# Patient Record
Sex: FEMALE | Race: WHITE | Employment: FULL TIME | URBAN - METROPOLITAN AREA
[De-identification: names, ages, dates, MRNs, and addresses within clinical notes are randomized per-mention and may not be internally consistent; named-entity substitution may affect disease eponyms.]

---

## 2017-01-03 RX ORDER — ALBUTEROL SULFATE 90 UG/1
2 AEROSOL, METERED RESPIRATORY (INHALATION)
Qty: 1 INHALER | Refills: 2 | Status: SHIPPED | OUTPATIENT
Start: 2017-01-03 | End: 2018-03-13 | Stop reason: SDUPTHER

## 2017-01-03 NOTE — TELEPHONE ENCOUNTER
Cordell Uribe,   Happy New Year. Ring Junk Ring Junk Ring Junk I need a refill on my inhaler please. ..... Ring Junk Having my usual bronchitis. ..  Went to [facility] last night  1/1/17.    Edwin French

## 2017-01-27 ENCOUNTER — TELEPHONE (OUTPATIENT)
Dept: FAMILY MEDICINE CLINIC | Age: 47
End: 2017-01-27

## 2017-01-27 DIAGNOSIS — Z12.39 BREAST CANCER SCREENING: Primary | ICD-10-CM

## 2017-01-27 NOTE — TELEPHONE ENCOUNTER
Patient is calling, patient has made an appointment for 4/10/17 for her CPE. Patient is requesting that a order for a mammogram be put in for her so that she can get this test done the same day. Advised patient that they are not done here, but we can put in an order, and our coordination of care would be giving her a call to schedule at her nearest imaging facility.      Best call back # for patient if needed: [phone]

## 2017-04-10 ENCOUNTER — HOSPITAL ENCOUNTER (OUTPATIENT)
Dept: MAMMOGRAPHY | Age: 47
Discharge: HOME | End: 2017-04-10
Attending: FAMILY MEDICINE
Payer: COMMERCIAL

## 2017-04-10 ENCOUNTER — OFFICE VISIT (OUTPATIENT)
Dept: FAMILY MEDICINE CLINIC | Age: 47
End: 2017-04-10

## 2017-04-10 VITALS
SYSTOLIC BLOOD PRESSURE: 154 MMHG | WEIGHT: 200 LBS | HEART RATE: 77 BPM | BODY MASS INDEX: 31.39 KG/M2 | OXYGEN SATURATION: 97 % | HEIGHT: 67 IN | DIASTOLIC BLOOD PRESSURE: 94 MMHG | TEMPERATURE: 98.7 F | RESPIRATION RATE: 18 BRPM

## 2017-04-10 DIAGNOSIS — R55 VASOVAGAL EPISODE: ICD-10-CM

## 2017-04-10 DIAGNOSIS — J45.20 MILD INTERMITTENT ASTHMA WITHOUT COMPLICATION: ICD-10-CM

## 2017-04-10 DIAGNOSIS — E78.2 HYPERCHOLESTEROLEMIA WITH HYPERTRIGLYCERIDEMIA: ICD-10-CM

## 2017-04-10 DIAGNOSIS — K76.0 NAFL (NONALCOHOLIC FATTY LIVER): ICD-10-CM

## 2017-04-10 DIAGNOSIS — F41.8 DEPRESSION WITH ANXIETY: ICD-10-CM

## 2017-04-10 DIAGNOSIS — Z01.419 WELL WOMAN EXAM WITH ROUTINE GYNECOLOGICAL EXAM: ICD-10-CM

## 2017-04-10 DIAGNOSIS — Z00.00 ROUTINE GENERAL MEDICAL EXAMINATION AT A HEALTH CARE FACILITY: Primary | ICD-10-CM

## 2017-04-10 DIAGNOSIS — Z12.31 VISIT FOR SCREENING MAMMOGRAM: ICD-10-CM

## 2017-04-10 DIAGNOSIS — I10 BENIGN ESSENTIAL HYPERTENSION: ICD-10-CM

## 2017-04-10 DIAGNOSIS — Z12.39 SCREENING FOR BREAST CANCER: ICD-10-CM

## 2017-04-10 PROBLEM — G44.209 MUSCLE CONTRACTION HEADACHE: Status: ACTIVE | Noted: 2017-04-10

## 2017-04-10 PROCEDURE — 77063 BREAST TOMOSYNTHESIS BI: CPT

## 2017-04-10 RX ORDER — LORATADINE 10 MG/1
10 TABLET ORAL DAILY
COMMUNITY

## 2017-04-10 RX ORDER — CYCLOBENZAPRINE HCL 10 MG
10 TABLET ORAL AS NEEDED
COMMUNITY
End: 2020-12-31 | Stop reason: SDUPTHER

## 2017-04-10 RX ORDER — BISMUTH SUBSALICYLATE 262 MG
1 TABLET,CHEWABLE ORAL DAILY
COMMUNITY
End: 2018-12-26

## 2017-04-10 RX ORDER — GLUCOSAMINE SULFATE 1500 MG
POWDER IN PACKET (EA) ORAL DAILY
COMMUNITY
End: 2018-12-26

## 2017-04-10 NOTE — PATIENT INSTRUCTIONS
Vasovagal Syncope: Care Instructions  Your Care Instructions    Vasovagal syncope (say \"ftb-kqy-IHYCarmen ULLOA-kuh-pee\") is sudden dizziness or fainting that can be set off by things such as pain, stress, fear, or trauma. You may sweat or feel lightheaded, sick to your stomach, or tingly. The problem causes the heart rate to slow and the blood vessels to widen, or dilate, for a short time. When this happens, blood pools in the lower body, and less blood goes to the brain. You can usually get relief by lying down with your legs raised (elevated). This helps more blood to flow to your brain and may help relieve symptoms like feeling dizzy. Some doctors may recommend a technique that involves tensing your fists and arms. This type of fainting is often easy to predict. For example, it happens to some people when they see blood or have to get a shot. They may feel symptoms before they faint. An episode of vasovagal syncope usually responds well to self-care. Other treatment often isn't needed. But if the fainting keeps happening, your doctor may suggest further treatments. Follow-up care is a key part of your treatment and safety. Be sure to make and go to all appointments, and call your doctor if you are having problems. It's also a good idea to know your test results and keep a list of the medicines you take. How can you care for yourself at home? · Drink plenty of fluids to prevent dehydration. If you have kidney, heart, or liver disease and have to limit fluids, talk with your doctor before you increase your fluid intake. · Try to avoid things that you think may set off vasovagal syncope. · Talk to your doctor about any medicines you take. Some medicines may increase the chance of this condition occurring. · If you feel symptoms, lie down with your legs raised. Talk to your doctor about what to do if your symptoms come back. When should you call for help?   Call 911 anytime you think you may need emergency care. For example, call if:  · You have symptoms of a heart problem. These may include:  ¨ Chest pain or pressure. ¨ Severe trouble breathing. ¨ A fast or irregular heartbeat. Watch closely for changes in your health, and be sure to contact your doctor if:  · You have more episodes of fainting at home. · You do not get better as expected. Where can you learn more? Go to http://margaret-dano.info/. Enter L754 in the search box to learn more about \"Vasovagal Syncope: Care Instructions. \"  Current as of: May 27, 2016  Content Version: 11.2  © 4938-1777 [employer]. Care instructions adapted under license by [employer] (which disclaims liability or warranty for this information). If you have questions about a medical condition or this instruction, always ask your healthcare professional. [address] any warranty or liability for your use of this information. Well Visit, Ages 25 to 48: Care Instructions  Your Care Instructions  Physical exams can help you stay healthy. Your doctor has checked your overall health and may have suggested ways to take good care of yourself. He or she also may have recommended tests. At home, you can help prevent illness with healthy eating, regular exercise, and other steps. Follow-up care is a key part of your treatment and safety. Be sure to make and go to all appointments, and call your doctor if you are having problems. It's also a good idea to know your test results and keep a list of the medicines you take. How can you care for yourself at home? · Reach and stay at a healthy weight. This will lower your risk for many problems, such as obesity, diabetes, heart disease, and high blood pressure. · Get at least 30 minutes of physical activity on most days of the week. Walking is a good choice.  You also may want to do other activities, such as running, swimming, cycling, or playing tennis or team sports. Discuss any changes in your exercise program with your doctor. · Do not smoke or allow others to smoke around you. If you need help quitting, talk to your doctor about stop-smoking programs and medicines. These can increase your chances of quitting for good. · Talk to your doctor about whether you have any risk factors for sexually transmitted infections (STIs). Having one sex partner (who does not have STIs and does not have sex with anyone else) is a good way to avoid these infections. · Use birth control if you do not want to have children at this time. Talk with your doctor about the choices available and what might be best for you. · Protect your skin from too much sun. When you're outdoors from 10 a.m. to 4 p.m., stay in the shade or cover up with clothing and a hat with a wide brim. Wear sunglasses that block UV rays. Even when it's cloudy, put broad-spectrum sunscreen (SPF 30 or higher) on any exposed skin. · See a dentist one or two times a year for checkups and to have your teeth cleaned. · Wear a seat belt in the car. · Drink alcohol in moderation, if at all. That means no more than 2 drinks a day for men and 1 drink a day for women. Follow your doctor's advice about when to have certain tests. These tests can spot problems early. For everyone  · Cholesterol. Have the fat (cholesterol) in your blood tested after age 21. Your doctor will tell you how often to have this done based on your age, family history, or other things that can increase your risk for heart disease. · Blood pressure. Have your blood pressure checked during a routine doctor visit. Your doctor will tell you how often to check your blood pressure based on your age, your blood pressure results, and other factors. · Vision. Talk with your doctor about how often to have a glaucoma test.  · Diabetes. Ask your doctor whether you should have tests for diabetes. · Colon cancer. Have a test for colon cancer at age 48.  You may have one of several tests. If you are younger than 48, you may need a test earlier if you have any risk factors. Risk factors include whether you already had a precancerous polyp removed from your colon or whether your parent, brother, sister, or child has had colon cancer. For women  · Breast exam and mammogram. Talk to your doctor about when you should have a clinical breast exam and a mammogram. Medical experts differ on whether and how often women under 50 should have these tests. Your doctor can help you decide what is right for you. · Pap test and pelvic exam. Begin Pap tests at age 24. A Pap test is the best way to find cervical cancer. The test often is part of a pelvic exam. Ask how often to have this test.  · Tests for sexually transmitted infections (STIs). Ask whether you should have tests for STIs. You may be at risk if you have sex with more than one person, especially if your partners do not wear condoms. For men  · Tests for sexually transmitted infections (STIs). Ask whether you should have tests for STIs. You may be at risk if you have sex with more than one person, especially if you do not wear a condom. · Testicular cancer exam. Ask your doctor whether you should check your testicles regularly. · Prostate exam. Talk to your doctor about whether you should have a blood test (called a PSA test) for prostate cancer. Experts differ on whether and when men should have this test. Some experts suggest it if you are older than 39 and are -American or have a father or brother who got prostate cancer when he was younger than 72. When should you call for help? Watch closely for changes in your health, and be sure to contact your doctor if you have any problems or symptoms that concern you. Where can you learn more? Go to http://margaret-dano.info/. Enter P072 in the search box to learn more about \"Well Visit, Ages 25 to 48: Care Instructions. \"  Current as of: July 19, 2016  Content Version: 11.2  © 0147-2439 [employer]. Care instructions adapted under license by [employer] (which disclaims liability or warranty for this information). If you have questions about a medical condition or this instruction, always ask your healthcare professional. [address] any warranty or liability for your use of this information.

## 2017-04-10 NOTE — MR AVS SNAPSHOT
Visit Information Date & Time Provider Department Dept. Phone Encounter #  
 4/10/2017 11:00 AM Zhaola Janetin, [address] [phone] [id number] Upcoming Health Maintenance Date Due  
 PAP AKA CERVICAL CYTOLOGY 2/24/2018 Pneumococcal 19-64 Highest Risk (2 of 3 - PCV13) 4/10/2018 DTaP/Tdap/Td series (2 - Td) 2/17/2024 Allergies as of 4/10/2017  Review Complete On: 4/10/2017 By: Anna Mendoza MD  
  
 Severity Noted Reaction Type Reactions Crestor [Rosuvastatin]  10/12/2015    Myalgia Insomnia and muscle pain Flonase [Fluticasone]  03/22/2016    Other (comments) Cataracts enlarged Hydrochlorothiazide  03/05/2015    Other (comments) Insomnia, headache, ringing in ears, anxious Nortriptyline  04/10/2017    Other (comments) Increased depression Other Medication  01/09/2012    Other (comments) Pt is avoiding Nasal CS d/t cataracts Sulfa (Sulfonamide Antibiotics)  04/28/2010    Hives Current Immunizations  Reviewed on 4/12/2016 Name Date Influenza Vaccine 11/12/2014 Meningococcal Vaccine 8/7/2002 PPD 8/13/1999, 6/19/1998 Rabies Vaccine 9/28/2003 TD Vaccine 9/28/2003, 6/19/1998 Tdap 2/17/2014 Not reviewed this visit You Were Diagnosed With   
  
 Codes Comments Routine general medical examination at a health care facility    -  Primary ICD-10-CM: Z00.00 ICD-9-CM: V70.0 Well woman exam with routine gynecological exam     ICD-10-CM: R54.983 ICD-9-CM: V72.31 Hypercholesterolemia with hypertriglyceridemia     ICD-10-CM: E78.2 ICD-9-CM: 272.2 Benign essential hypertension     ICD-10-CM: I10 
ICD-9-CM: 401.1 NAFL (nonalcoholic fatty liver)     ICD-10-CM: K76.0 ICD-9-CM: 571.8 Vasovagal episode     ICD-10-CM: R55 
ICD-9-CM: 780.2 Screening for breast cancer     ICD-10-CM: Z12.39 
ICD-9-CM: V76.10 Vitals BP Pulse Temp Resp Height(growth percentile) Weight(growth percentile) (!) 154/94 (BP 1 Location: Left arm, BP Patient Position: Sitting) 77 98.7 °F (37.1 °C) (Oral) 18 5' 7.25\" (1.708 m) 200 lb (90.7 kg) SpO2 BMI OB Status Smoking Status 97% 31.09 kg/m2 Medically Induced Former Smoker BMI and BSA Data Body Mass Index Body Surface Area 31.09 kg/m 2 2.07 m 2 Preferred Pharmacy Pharmacy Name Phone [id number] [phone] [phone] Your Updated Medication List  
  
   
This list is accurate as of: 4/10/17 12:21 PM.  Always use your most recent med list.  
  
  
  
  
 albuterol 90 mcg/actuation inhaler Commonly known as:  PROVENTIL HFA, VENTOLIN HFA, PROAIR HFA Take 2 Puffs by inhalation every six (6) hours as needed for Wheezing. ALPRAZolam 0.5 mg tablet Commonly known as:  XANAX  
TAKE 1/2 TABLET AT BEDTIME IF NEEDED FOR ANXIETY/SLEEP  
  
 aspirin delayed-release 81 mg tablet Take  by mouth daily. CLARITIN 10 mg tablet Generic drug:  loratadine Take 10 mg by mouth daily. cyclobenzaprine 10 mg tablet Commonly known as:  FLEXERIL Take 10 mg by mouth as needed for Muscle Spasm(s). Takes a few x a month max FISH OIL PO Take 1,000 mg by mouth daily. levonorgestrel-ethinyl estradiol 0.15 mg-30 mcg 3mpk Commonly known as:  SEASONALE  
TAKE 1 TABLET BY MOUTH EVERY DAY  
  
 multivitamin tablet Commonly known as:  ONE A DAY Take 1 Tab by mouth daily. sertraline 50 mg tablet Commonly known as:  ZOLOFT  
TAKE 1 AND 1/2 TABLETS BY MOUTH DAILY  
  
 SLO-NIACIN PO Take 500 mg by mouth daily. VITAMIN D3 1,000 unit Cap Generic drug:  cholecalciferol Take  by mouth daily. We Performed the Following AMB POC EKG ROUTINE W/ 12 LEADS, INTER & REP [12261 CPT(R)] CBC WITH AUTOMATED DIFF [56550 CPT(R)] HEMOGLOBIN A1C WITH EAG [59330 CPT(R)] LIPID PANEL [03244 CPT(R)] METABOLIC PANEL, COMPREHENSIVE [52431 CPT(R)] PAP IG, HPV AND RFX HPV 80/74,68([id number]) [47578 CPT(R)] TSH 3RD GENERATION [59669 CPT(R)] URINALYSIS W/MICROSCOPIC [00663 CPT(R)] To-Do List   
 04/10/2017 Imaging:  [facility] 3D ROCK W MAMMO BI DX INCL CAD   
  
 04/10/2017 1:30 PM  
(Arrive by 1:15 PM) Appointment with [facility] MAM 1 at [facility] ([phone]) Shower or bathe using soap and water. Do not use deodorant, powder, perfumes, or lotion the day of your exam.  If your prior mammograms were not performed at [facility] please bring films with you or forward prior images 2 days before your procedure. Check in at registration 15min before your appointment time unless you were instructed to do otherwise. A script is not necessary, but if you have one, please bring it on the day of the mammogram or have it faxed to the department. [facility] [phone] [facility]  [phone] [facility]  [phone] [facility] [phone] [address] [phone] Please arrive 15 minutes prior to appointment to register Patient Instructions Vasovagal Syncope: Care Instructions Your Care Instructions Vasovagal syncope (say \"igc-yzs-RBC-gul XZIT-jww-ldk\") is sudden dizziness or fainting that can be set off by things such as pain, stress, fear, or trauma. You may sweat or feel lightheaded, sick to your stomach, or tingly. The problem causes the heart rate to slow and the blood vessels to widen, or dilate, for a short time. When this happens, blood pools in the lower body, and less blood goes to the brain. You can usually get relief by lying down with your legs raised (elevated). This helps more blood to flow to your brain and may help relieve symptoms like feeling dizzy. Some doctors may recommend a technique that involves tensing your fists and arms. This type of fainting is often easy to predict. For example, it happens to some people when they see blood or have to get a shot. They may feel symptoms before they faint. An episode of vasovagal syncope usually responds well to self-care. Other treatment often isn't needed. But if the fainting keeps happening, your doctor may suggest further treatments. Follow-up care is a key part of your treatment and safety. Be sure to make and go to all appointments, and call your doctor if you are having problems. It's also a good idea to know your test results and keep a list of the medicines you take. How can you care for yourself at home? · Drink plenty of fluids to prevent dehydration. If you have kidney, heart, or liver disease and have to limit fluids, talk with your doctor before you increase your fluid intake. · Try to avoid things that you think may set off vasovagal syncope. · Talk to your doctor about any medicines you take. Some medicines may increase the chance of this condition occurring. · If you feel symptoms, lie down with your legs raised. Talk to your doctor about what to do if your symptoms come back. When should you call for help? Call 911 anytime you think you may need emergency care. For example, call if: 
· You have symptoms of a heart problem. These may include: ¨ Chest pain or pressure. ¨ Severe trouble breathing. ¨ A fast or irregular heartbeat. Watch closely for changes in your health, and be sure to contact your doctor if: 
· You have more episodes of fainting at home. · You do not get better as expected. Where can you learn more? Go to http://margaret-dano.info/. Enter L754 in the search box to learn more about \"Vasovagal Syncope: Care Instructions. \" Current as of: May 27, 2016 Content Version: 11.2 © 3676-3230 [employer].  Care instructions adapted under license by [employer] (which disclaims liability or warranty for this information). If you have questions about a medical condition or this instruction, always ask your healthcare professional. [address] any warranty or liability for your use of this information. Well Visit, Ages 25 to 48: Care Instructions Your Care Instructions Physical exams can help you stay healthy. Your doctor has checked your overall health and may have suggested ways to take good care of yourself. He or she also may have recommended tests. At home, you can help prevent illness with healthy eating, regular exercise, and other steps. Follow-up care is a key part of your treatment and safety. Be sure to make and go to all appointments, and call your doctor if you are having problems. It's also a good idea to know your test results and keep a list of the medicines you take. How can you care for yourself at home? · Reach and stay at a healthy weight. This will lower your risk for many problems, such as obesity, diabetes, heart disease, and high blood pressure. · Get at least 30 minutes of physical activity on most days of the week. Walking is a good choice. You also may want to do other activities, such as running, swimming, cycling, or playing tennis or team sports. Discuss any changes in your exercise program with your doctor. · Do not smoke or allow others to smoke around you. If you need help quitting, talk to your doctor about stop-smoking programs and medicines. These can increase your chances of quitting for good. · Talk to your doctor about whether you have any risk factors for sexually transmitted infections (STIs). Having one sex partner (who does not have STIs and does not have sex with anyone else) is a good way to avoid these infections. · Use birth control if you do not want to have children at this time. Talk with your doctor about the choices available and what might be best for you. · Protect your skin from too much sun. When you're outdoors from 10 a.m. to 4 p.m., stay in the shade or cover up with clothing and a hat with a wide brim. Wear sunglasses that block UV rays. Even when it's cloudy, put broad-spectrum sunscreen (SPF 30 or higher) on any exposed skin. · See a dentist one or two times a year for checkups and to have your teeth cleaned. · Wear a seat belt in the car. · Drink alcohol in moderation, if at all. That means no more than 2 drinks a day for men and 1 drink a day for women. Follow your doctor's advice about when to have certain tests. These tests can spot problems early. For everyone · Cholesterol. Have the fat (cholesterol) in your blood tested after age 21. Your doctor will tell you how often to have this done based on your age, family history, or other things that can increase your risk for heart disease. · Blood pressure. Have your blood pressure checked during a routine doctor visit. Your doctor will tell you how often to check your blood pressure based on your age, your blood pressure results, and other factors. · Vision. Talk with your doctor about how often to have a glaucoma test. 
· Diabetes. Ask your doctor whether you should have tests for diabetes. · Colon cancer. Have a test for colon cancer at age 48. You may have one of several tests. If you are younger than 48, you may need a test earlier if you have any risk factors. Risk factors include whether you already had a precancerous polyp removed from your colon or whether your parent, brother, sister, or child has had colon cancer. For women · Breast exam and mammogram. Talk to your doctor about when you should have a clinical breast exam and a mammogram. Medical experts differ on whether and how often women under 50 should have these tests. Your doctor can help you decide what is right for you. · Pap test and pelvic exam. Begin Pap tests at age 24.  A Pap test is the best way to find cervical cancer. The test often is part of a pelvic exam. Ask how often to have this test. 
· Tests for sexually transmitted infections (STIs). Ask whether you should have tests for STIs. You may be at risk if you have sex with more than one person, especially if your partners do not wear condoms. For men · Tests for sexually transmitted infections (STIs). Ask whether you should have tests for STIs. You may be at risk if you have sex with more than one person, especially if you do not wear a condom. · Testicular cancer exam. Ask your doctor whether you should check your testicles regularly. · Prostate exam. Talk to your doctor about whether you should have a blood test (called a PSA test) for prostate cancer. Experts differ on whether and when men should have this test. Some experts suggest it if you are older than 39 and are -American or have a father or brother who got prostate cancer when he was younger than 72. When should you call for help? Watch closely for changes in your health, and be sure to contact your doctor if you have any problems or symptoms that concern you. Where can you learn more? Go to http://margaret-dano.info/. Enter P072 in the search box to learn more about \"Well Visit, Ages 25 to 48: Care Instructions. \" Current as of: July 19, 2016 Content Version: 11.2 © 0251-9479 [employer]. Care instructions adapted under license by [employer] (which disclaims liability or warranty for this information). If you have questions about a medical condition or this instruction, always ask your healthcare professional. [place] [address] any warranty or liability for your use of this information. Introducing [facility] & HEALTH SERVICES! Dear [facility]: Thank you for requesting a [employer] account. Our records indicate that you already have an active [employer] account.   You can access your account anytime at [email or web address] Did you know that you can access your hospital and ER discharge instructions at any time in [employer]? You can also review all of your test results from your hospital stay or ER visit. Additional Information If you have questions, please visit the Frequently Asked Questions section of the [employer] website at [email or web address] Remember, [employer] is NOT to be used for urgent needs. For medical emergencies, dial 911. Now available from your iPhone and Android! Please provide this summary of care documentation to your next provider. Your primary care clinician is listed as SUHA COWART. If you have any questions after today's visit, please call [phone].

## 2017-04-10 NOTE — PROGRESS NOTES
HISTORY OF PRESENT ILLNESS  Emeka Mantilla is a 55 y.o. female. HPI  Annual CPE, fasting and well woman visit/gyn exam w/ pap. Continues on Seasonale for control of primary dysmenorrhea. Continues on Zoloft for mood stability and PMS. This is working well for her. She tried lowering her dose from 75 to 50 mg daily but she felt more gonzalez and irritable, so she has been back on the 75 mg dose and doing fine since then. She had seen Neuro for migraine headaches. She was prescribed Pamelor but pt felt like that medication made her down and depressed, so she is no longer taking it. She has not had anymore headaches and has not needed Imitrex. She takes Flexeril maybe once a month for muscle spasms/tension in neck which really works well for her. Takes Xanax 1/2 tablet qhs prn insomnia related to anxiety. She has noticed a new \"tremor or twitch\" in her hands at times. Concerned b/c father has same h/o this and pt wants \"testing\", so she will be going back to neuro Dr Renetta Oliva for further evaluation of this. Reports a fainting spell that happened at work a little over a month ago. On the morning of 3/1/17 around 9 AM she was standing in the office getting \"fussed at\" by her boss. She hadnt eaten breakfast that morning (and later through today's visit realized she takes Niacin QAM which I have since instructed her to take qhs w/ low fat snack). She began to feel hot, light headed, and sat in the chair, then fainted and fell forward onto the desk face down. Her boss was there and kept her from falling out of the chair. Patient states she was \"out\" for less than 10 sec. Coworker gave her some PB crackers and they called 911. EMT checked her. She states her vitals and BS were all normal by that time  And she felt completely fine afterwards. She even worked the rest of the day. No recurrences since that time. She admits her eating habits havent been that great lately at all.      Review of Systems Constitutional: Negative. HENT: Negative. Eyes: Negative. Wears glasses, up to date on routine eye exam at [facility]   Respiratory: Negative. Negative for sputum production. Only used her rescue inhaler about 4 x last year. Triggered by fall time in October, URI in 12/2016, cleaning agents/dust they are using at work occ will trigger it. Inhaler or removing herself from close proximity during those times quickly alleviates her sx   Cardiovascular: Negative. Gastrointestinal: Negative. Genitourinary: Negative. Musculoskeletal: Negative. Neurological: Negative for dizziness, tingling and focal weakness. Psychiatric/Behavioral:        Doing well on current regimen of Zoloft. She takes Xanax 1/2 tablet most nights for sleep onset and sleep maintenance during weekdays     Problem List  Date Reviewed: 4/10/2017          Codes Class Noted    Thrombocytosis ([facility] [address] ICD-10-CM: D47.3  ICD-9-CM: 238.71  3/22/2016    Overview Signed 3/22/2016 10:08 AM by MD Dr Sun Conley 2015             Lymphocytosis ICD-10-CM: W80.820  ICD-9-CM: 288.61  3/22/2016    Overview Signed 3/22/2016 10:08 AM by MD Dr Sun Conley, Maeve             Microhematuria ICD-10-CM: R31.29  ICD-9-CM: 599.72  12/15/2015    Overview Signed 12/15/2015  3:47 PM by Mercedes Raines MD     [id number] [facility] [address] Kriss Mejia.  KUB neg excpet small stone vs phlebolith             NAFL (nonalcoholic fatty liver) JAG-09-DM: K76.0  ICD-9-CM: 571.8  8/27/2015    Overview Signed 8/27/2015  8:45 AM by MD Dr Madai Conley             Focal nodular hyperplasia of liver ICD-10-CM: K76.89  ICD-9-CM: 573.8  5/10/2015    Overview Signed 8/27/2015  8:45 AM by MD MADELINE Conley Dr and Tricia Harmon             Benign essential hypertension ICD-10-CM: I10  ICD-9-CM: 401.1  2/24/2015    Overview Signed 2/24/2015 10:21 AM by Mercedes Raines MD [id number]             Vitamin D deficiency ICD-10-CM: E55.9  ICD-9-CM: 268.9  12/13/2011    Overview Signed 12/13/2011  1:16 PM by Luisa Abreu MD     10/2011             Hypercholesterolemia with high triglycerides ICD-10-CM: E78.2  ICD-9-CM: 272.2  12/13/2011        Chronic Mechanical back pain ICD-10-CM: M54.9  ICD-9-CM: 724.5  10/25/2011        History of pyelonephritis, 1992 ICD-10-CM: Z87.440  ICD-9-CM: V13.02  10/25/2011        Allergic rhinitis ICD-10-CM: J30.9  ICD-9-CM: 477.9  10/25/2011    Overview Signed 10/25/2011  9:45 AM by Luisa Abreu MD     Worse in Spring             Cataract of right eye ICD-10-CM: H26.9  ICD-9-CM: 366.9  10/25/2011    Overview Signed 10/25/2011  9:46 AM by Luisa Abreu MD     Opth VEI, Dr Lisa Stein             S/P Dog bite, 2003 ICD-10-CM: Bhavna Signs. 0XXA  ICD-9-CM: E906.0  10/25/2011    Overview Signed 10/25/2011  9:46 AM by Luisa Abreu MD     Td and Rabies Series             Rosacea ICD-10-CM: L71.9  ICD-9-CM: 695.3  10/25/2011        Fibrocystic breast changes ICD-10-CM: N60.19  ICD-9-CM: 610.1  10/25/2011        Raynaud's syndrome ICD-10-CM: I73.00  ICD-9-CM: 443.0  Unknown    Overview Signed 8/27/2015  8:48 AM by Luisa Abreu MD     Autoimmune workup Dr Agustin Armendariz Spring 2015             PMS (premenstrual syndrome) ICD-10-CM: N94.3  ICD-9-CM: 625.4  6/21/2010        Depression ICD-10-CM: F32.9  ICD-9-CM: 914  6/21/2010        Asthma, mild intermittent ICD-10-CM: J45.909  ICD-9-CM: 493.90  6/21/2010    Overview Signed 10/25/2011  9:45 AM by Luisa Abreu MD     Since ~ 20 yo             Primary dysmenorrhea ICD-10-CM: N94.4  ICD-9-CM: 625.3  6/21/2010        Anxiety attacks ICD-10-CM: F41.0  ICD-9-CM: 300.01  6/21/2010    Overview Signed 6/21/2010  8:28 PM by Luisa Abreu MD     March 2010                 Past Surgical History:   Procedure Laterality Date    EKG TREADMILL STRESS TEST  May 2010    Dr Boy Lorenzo and Dr Reji Harmon eval for atypical CP; dx Anxiety    EMG TWO EXTREMITIES UPPER      normal    HX OTHER SURGICAL  3/2014 ordered by Dr Iram Tavares    Bone Marrow Biospy, benign    HX SHOULDER ARTHROSCOPY      left shoulder    US PELV NON OBS      normal    XR SPINE LUMB 2 OR 3 V      normal    XR SPINE THORAC 3 V      normal    XR UPPER GI SERIES W KUB      normal     OB History      Para Term  AB TAB SAB Ectopic Multiple Living    0 0 0 0 0 0 0 0 0 0        Current Outpatient Prescriptions   Medication Sig    cyclobenzaprine (FLEXERIL) 10 mg tablet Take 10 mg by mouth as needed for Muscle Spasm(s). Takes a few x a month max for neck muscle spasms and tension    SLO-NIACIN PO Take 500 mg by mouth daily.  DOCOSAHEXANOIC ACID/EPA (FISH OIL PO) Take 1,000 mg by mouth daily.  multivitamin (ONE A DAY) tablet Take 1 Tab by mouth daily.  cholecalciferol (VITAMIN D3) 1,000 unit cap Take  by mouth daily.  loratadine (CLARITIN) 10 mg tablet Take 10 mg by mouth daily.  levonorgestrel-ethinyl estradiol (SEASONALE) 0.15 mg-30 mcg 3MPk TAKE 1 TABLET BY MOUTH EVERY DAY    sertraline (ZOLOFT) 50 mg tablet TAKE 1 AND 1/2 TABLETS BY MOUTH DAILY    albuterol (PROVENTIL HFA, VENTOLIN HFA, PROAIR HFA) 90 mcg/actuation inhaler Take 2 Puffs by inhalation every six (6) hours as needed for Wheezing.  ALPRAZolam (XANAX) 0.5 mg tablet TAKE 1/2 TABLET AT BEDTIME IF NEEDED FOR ANXIETY/SLEEP    aspirin delayed-release 81 mg tablet Take  by mouth daily. No current facility-administered medications for this visit.       Allergies   Allergen Reactions    Crestor [Rosuvastatin] Myalgia     Insomnia and muscle pain    Flonase [Fluticasone] Other (comments)     Cataracts enlarged    Hydrochlorothiazide Other (comments)     Insomnia, headache, ringing in ears, anxious    Nortriptyline Other (comments)     Increased depression    Other Medication Other (comments)     Pt is avoiding Nasal CS d/t cataracts    Sulfa (Sulfonamide Antibiotics) Hives       Social History     Social History    Marital status: SINGLE     Spouse name: N/A    Number of children: 0    Years of education: N/A     Occupational History         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    Smoking status: Former Smoker     Types: Cigarettes     Quit date: 10/25/1991    Smokeless tobacco: Never Used      Comment: smoked lightly for about 5 months    Alcohol use 0.0 oz/week     0 Standard drinks or equivalent per week      Comment: very seldom    Drug use: No    Sexual activity: No     Other Topics Concern    Caffeine Concern Yes     3 12 oz cans of diet Mountain Dews a day    Stress Concern Yes     was going for sleep eval per Dr Tito Closs 2015 to r/o JUAN MANUEL, but she never went    Weight Concern No    Special Diet No     not doing ATkins right now but less fast foods, no fried foods    Exercise No     Social History Narrative     Immunization History   Administered Date(s) Administered    Influenza Vaccine 2014    Meningococcal Vaccine 2002    PPD 1998, 1999    Rabies Vaccine 2003    TD Vaccine 1998, 2003    Tdap 2014       Family History   Problem Relation Age of Onset    High Cholesterol Mother      high TG's in the 80's    Hypertension Mother     Thyroid Disease Mother     Other Mother      benign ovarian cysts    Anxiety Mother     Depression Mother     Diabetes Father      type 2    Cancer Father      Hodgkins    Arrhythmia Father 72     Ablation; chemo/radiation induced    Heart Surgery Father 72     stents placed    Anxiety Maternal Grandmother     Heart Disease Maternal Grandmother       80; h/o A.  Fib    Stroke Maternal Grandmother     Heart Attack Maternal Grandfather       age 74    Other Paternal Grandmother       GI bleed in her early 57's    Cancer Paternal Grandfather 61 pancreatic and liver cancer,  age 61     Immunization History   Administered Date(s) Administered    Influenza Vaccine 2014    Meningococcal Vaccine 2002    PPD 1998, 1999    Rabies Vaccine 2003    TD Vaccine 1998, 2003    Tdap 2014     Visit Vitals    BP (!) 154/94 (BP 1 Location: Left arm, BP Patient Position: Sitting), states she is very anxious bc having pap smear today which always makes her this way    Pulse 77    Temp 98.7 °F (37.1 °C) (Oral)    Resp 18    Ht 5' 7.25\" (1.708 m)    Wt 200 lb (90.7 kg)    SpO2 97%    BMI 31.09 kg/m2     Physical Exam   Constitutional: She is oriented to person, place, and time. She appears well-developed and well-nourished. No distress. HENT:   Right Ear: Tympanic membrane normal.   Left Ear: Tympanic membrane normal.   Nose: Nose normal.   Mouth/Throat: Oropharynx is clear and moist. No oropharyngeal exudate. Eyes: Conjunctivae and EOM are normal. Pupils are equal, round, and reactive to light. Neck: Neck supple. No JVD present. Carotid bruit is not present. No thyromegaly present. Cardiovascular: Normal rate, regular rhythm, normal heart sounds and intact distal pulses. Exam reveals no gallop. No murmur heard. Pulmonary/Chest: Effort normal and breath sounds normal. No respiratory distress. She has no wheezes. She has no rales. Right breast exhibits no inverted nipple, no nipple discharge, no skin change and no tenderness. Left breast exhibits no inverted nipple, no nipple discharge, no skin change and no tenderness. Breasts are symmetrical.   B superior breast tissue dense, fibrous, cystic   Abdominal: Soft. Bowel sounds are normal. She exhibits no distension and no mass. There is no tenderness. Genitourinary: Vagina normal and uterus normal. Cervix exhibits no motion tenderness, no discharge and no friability. Right adnexum displays no mass, no tenderness and no fullness.  Left adnexum displays no mass, no tenderness and no fullness. No vaginal discharge found. Musculoskeletal: Normal range of motion. She exhibits no edema or tenderness. Lymphadenopathy:     She has no cervical adenopathy. She has no axillary adenopathy. Right: No supraclavicular adenopathy present. Left: No supraclavicular adenopathy present. Neurological: She is alert and oriented to person, place, and time. No cranial nerve deficit. Coordination normal.   Skin: Skin is warm and dry. Psychiatric: She has a normal mood and affect. Her behavior is normal. Thought content normal.   Vitals reviewed. ASSESSMENT and PLAN    ICD-10-CM ICD-9-CM    1. Routine general medical examination at a health care facility Z00.00 V70.0 CBC WITH AUTOMATED DIFF      LIPID PANEL      METABOLIC PANEL, COMPREHENSIVE      TSH 3RD GENERATION      HEMOGLOBIN A1C WITH EAG      AMB POC EKG ROUTINE W/ 12 LEADS, INTER & REP      URINALYSIS W/MICROSCOPIC   2. Well woman exam with routine gynecological exam Z01.419 V72.31 PAP IG, HPV AND RFX HPV 89/05,80([id number])   3. Hypercholesterolemia with high triglycerides E78.2 272.2 LIPID PANEL   4. Benign essential hypertension I10 401.1    5. Depression with anxiety F41.8 300.4 Stable on Zoloft 75 mg daily and prn Xanax as directed   6. NAFL (nonalcoholic fatty liver) B98.4 928.0 METABOLIC PANEL, COMPREHENSIVE   7. Vasovagal episode R55 780.2    8. Mild intermittent asthma without complication W41.37 630.86 Stable, controlled w/ trigger avoidance, environmental controls and prn Albuterol which she seldom needs to use   9.  Screening for breast cancer Z12.39 V76.10 ANGEL 3D ROCK W MAMMO BI SCREENING INCL CAD     Fasting labs today  Pap collected today  Mammogram scheduled for this afternoon  Reviewed diet, nutrition, exercise, weight control, health maintenance, wellness counseling  Cardiovascular risk and specific lipid/LDL/BP goals reviewed  Reviewed medications and side effects in detail  Further follow up & other recommendations pending review of labs as well  RTC 6 month follow up and medication check

## 2017-04-10 NOTE — PROGRESS NOTES
Chief Complaint   Patient presents with    Complete Physical     has well woman here -      1. Have you been to the ER, urgent care clinic since your last visit? Hospitalized since your last visit? No    2. Have you seen or consulted any other health care providers outside of the [address] [place] [address] since your last visit? Include any pap smears or colon screening.  No

## 2017-04-11 LAB
ALBUMIN SERPL-MCNC: 4.1 G/DL (ref 3.5–5.5)
ALBUMIN/GLOB SERPL: 1.9 {RATIO} (ref 1.2–2.2)
ALP SERPL-CCNC: 57 IU/L (ref 39–117)
ALT SERPL-CCNC: 18 IU/L (ref 0–32)
APPEARANCE UR: CLEAR
AST SERPL-CCNC: 17 IU/L (ref 0–40)
BACTERIA #/AREA URNS HPF: NORMAL /[HPF]
BASOPHILS # BLD AUTO: 0 X10E3/UL (ref 0–0.2)
BASOPHILS NFR BLD AUTO: 0 %
BILIRUB SERPL-MCNC: 0.3 MG/DL (ref 0–1.2)
BILIRUB UR QL STRIP: NEGATIVE
BUN SERPL-MCNC: 12 MG/DL (ref 6–24)
BUN/CREAT SERPL: 18 (ref 9–23)
CALCIUM SERPL-MCNC: 9.1 MG/DL (ref 8.7–10.2)
CASTS URNS QL MICRO: NORMAL /LPF
CHLORIDE SERPL-SCNC: 96 MMOL/L (ref 96–106)
CHOLEST SERPL-MCNC: 226 MG/DL (ref 100–199)
CO2 SERPL-SCNC: 20 MMOL/L (ref 18–29)
COLOR UR: YELLOW
CREAT SERPL-MCNC: 0.68 MG/DL (ref 0.57–1)
EOSINOPHIL # BLD AUTO: 0.3 X10E3/UL (ref 0–0.4)
EOSINOPHIL NFR BLD AUTO: 3 %
EPI CELLS #/AREA URNS HPF: NORMAL /HPF
ERYTHROCYTE [DISTWIDTH] IN BLOOD BY AUTOMATED COUNT: 14.2 % (ref 12.3–15.4)
EST. AVERAGE GLUCOSE BLD GHB EST-MCNC: 117 MG/DL
GLOBULIN SER CALC-MCNC: 2.2 G/DL (ref 1.5–4.5)
GLUCOSE SERPL-MCNC: 87 MG/DL (ref 65–99)
GLUCOSE UR QL: NEGATIVE
HBA1C MFR BLD: 5.7 % (ref 4.8–5.6)
HCT VFR BLD AUTO: 38.8 % (ref 34–46.6)
HDLC SERPL-MCNC: 49 MG/DL
HGB BLD-MCNC: 12.8 G/DL (ref 11.1–15.9)
HGB UR QL STRIP: ABNORMAL
IMM GRANULOCYTES # BLD: 0 X10E3/UL (ref 0–0.1)
IMM GRANULOCYTES NFR BLD: 0 %
INTERPRETATION, 910389: NORMAL
KETONES UR QL STRIP: NEGATIVE
LDLC SERPL CALC-MCNC: 146 MG/DL (ref 0–99)
LEUKOCYTE ESTERASE UR QL STRIP: ABNORMAL
LYMPHOCYTES # BLD AUTO: 3.9 X10E3/UL (ref 0.7–3.1)
LYMPHOCYTES NFR BLD AUTO: 39 %
MCH RBC QN AUTO: 29.2 PG (ref 26.6–33)
MCHC RBC AUTO-ENTMCNC: 33 G/DL (ref 31.5–35.7)
MCV RBC AUTO: 89 FL (ref 79–97)
MICRO URNS: ABNORMAL
MONOCYTES # BLD AUTO: 0.4 X10E3/UL (ref 0.1–0.9)
MONOCYTES NFR BLD AUTO: 4 %
NEUTROPHILS # BLD AUTO: 5.5 X10E3/UL (ref 1.4–7)
NEUTROPHILS NFR BLD AUTO: 54 %
NITRITE UR QL STRIP: NEGATIVE
PH UR STRIP: 6.5 [PH] (ref 5–7.5)
PLATELET # BLD AUTO: 458 X10E3/UL (ref 150–379)
POTASSIUM SERPL-SCNC: 5 MMOL/L (ref 3.5–5.2)
PROT SERPL-MCNC: 6.3 G/DL (ref 6–8.5)
PROT UR QL STRIP: NEGATIVE
RBC # BLD AUTO: 4.38 X10E6/UL (ref 3.77–5.28)
RBC #/AREA URNS HPF: NORMAL /HPF
SODIUM SERPL-SCNC: 132 MMOL/L (ref 134–144)
SP GR UR: 1.01 (ref 1–1.03)
TRIGL SERPL-MCNC: 154 MG/DL (ref 0–149)
TSH SERPL DL<=0.005 MIU/L-ACNC: 2.87 UIU/ML (ref 0.45–4.5)
UROBILINOGEN UR STRIP-MCNC: 0.2 MG/DL (ref 0.2–1)
VLDLC SERPL CALC-MCNC: 31 MG/DL (ref 5–40)
WBC # BLD AUTO: 10 X10E3/UL (ref 3.4–10.8)
WBC #/AREA URNS HPF: NORMAL /HPF

## 2017-04-12 LAB
CYTOLOGIST CVX/VAG CYTO: NORMAL
CYTOLOGY CVX/VAG DOC THIN PREP: NORMAL
DX ICD CODE: NORMAL
HPV I/H RISK 1 DNA CVX QL PROBE+SIG AMP: NEGATIVE
Lab: NORMAL
OTHER STN SPEC: NORMAL
PATH REPORT.FINAL DX SPEC: NORMAL
STAT OF ADQ CVX/VAG CYTO-IMP: NORMAL

## 2017-04-23 PROBLEM — R73.03 PREDIABETES: Status: ACTIVE | Noted: 2017-04-23

## 2017-04-24 NOTE — PROGRESS NOTES
Pap negative, may repeat in 3 yrs unless clinical change/concern  CBC shared w/ her Hematologist in [facility]  Liver, kidney, thyroid normal  BS good and normal  HgbA1c went up slightly and is in mild prediabetes range  Review lipids and goals.   Try to follow some simple nutrition/health tips: including avoiding concentrated sweets, carbohydrate restriction between 30-40g carbs max per meal, 15 grams carbs max per snack, not skipping meals, eating low fat/high protein snacks between meals, getting regular moderate intensity cardio/aerobic exercise at least 150 minutes per week  Fasting follow up 6months to reassess

## 2017-05-02 RX ORDER — ALPRAZOLAM 0.5 MG/1
TABLET ORAL
Qty: 30 TAB | Refills: 3 | OUTPATIENT
Start: 2017-05-02 | End: 2017-11-03 | Stop reason: SDUPTHER

## 2017-05-03 NOTE — TELEPHONE ENCOUNTER
No problem on  last filled per  3/7/17. PI of results and will call back to set up appt. Xanax called in.

## 2017-05-03 NOTE — PROGRESS NOTES
Advised patient of results and recommendations. She will call back to schedule and knows to do it in advance. She is driving and can't do it now.

## 2017-05-03 NOTE — TELEPHONE ENCOUNTER
Pull . If ok may phone in rx as ordered. Remind to schedule 6month follow up. Looks like [facility] attempted to reach her about her lab results and recs and was going to relay about the 6month follow up then as well.

## 2017-05-24 DIAGNOSIS — Z30.41 ENCOUNTER FOR BIRTH CONTROL PILLS MAINTENANCE: ICD-10-CM

## 2017-05-26 RX ORDER — LEVONORGESTREL AND ETHINYL ESTRADIOL 0.15-0.03
1 KIT ORAL DAILY
Qty: 91 TAB | Refills: 3 | Status: SHIPPED | OUTPATIENT
Start: 2017-05-26 | End: 2018-04-24 | Stop reason: SDUPTHER

## 2017-10-17 ENCOUNTER — OFFICE VISIT (OUTPATIENT)
Dept: NEUROLOGY | Age: 47
End: 2017-10-17

## 2017-10-17 VITALS
DIASTOLIC BLOOD PRESSURE: 92 MMHG | RESPIRATION RATE: 18 BRPM | BODY MASS INDEX: 31.71 KG/M2 | OXYGEN SATURATION: 98 % | HEART RATE: 88 BPM | SYSTOLIC BLOOD PRESSURE: 142 MMHG | WEIGHT: 204 LBS

## 2017-10-17 DIAGNOSIS — F41.9 ANXIETY DISORDER, UNSPECIFIED TYPE: ICD-10-CM

## 2017-10-17 DIAGNOSIS — R41.89 SUBJECTIVE MEMORY COMPLAINTS: ICD-10-CM

## 2017-10-17 DIAGNOSIS — R25.1 EXCESSIVE PHYSIOLOGIC TREMOR: Primary | ICD-10-CM

## 2017-10-17 NOTE — PROGRESS NOTES
Neurology Clinic Follow up Note    Patient ID:  Arnav Araujo  [id number]  7 y.o.  1970        Last Appointment With Me:  7/25/16      Interval History:   Pt previously followed for migraines. Now reporting new onset tremors over the past year which have been progressing. She notices tremor into both hands primarily with action. No head tremor or tremor involving the LE. She is not taking medication for tremor presently. +Fhx father with tremor  Denies falls or recent imbalance. Also endorsing recent short term memory difficulty- forgot how to navigate to the grocery store. Has not recurred since that time. +Stuttering  +Depression/anxiety- on medication prescribed by PCP. Endorses recent stressors from her dog passing away and work. PMHx/ PSHx/ FHx/ SHx:  Reviewed and unchanged previous visit. Past Medical History:   Diagnosis Date    Allergic rhinitis 10/25/2011    Anxiety attacks 6/21/2010    AR (allergic rhinitis)     spring    Asthma     Asthma, mild intermittent 6/21/2010    Benign essential hypertension 2/24/2015    [id number]    Benign liver cyst 5/2015    Dr Ara Chang     right eye    Cataract of right eye 10/25/2011    Cholesterol serum increased     Chronic back pain     anatomic    Chronic Mechanical back pain 10/25/2011    Depression     Dog bite(E906.0)     Td and Rabies series    Dysmenorrhea     Dysthymia     Fibrocystic breast changes 10/25/2011    History of pyelonephritis, 1992 10/25/2011    Hypercholesterolemia with high triglycerides 12/13/2011    Hypercholesterolemia, mild 6/21/2010    Lymphocytosis 3/22/2016    Dr Emily Garcia, 2015    Microhematuria 12/15/2015    [address] [facility] Mon.  KUB neg excpet small stone vs phlebolith    Muscle contraction headaches & Neck Muscle Spasms 4/10/2017    NAFL (nonalcoholic fatty liver) 4/50/9158    Dr Doe Landis PMS (premenstrual syndrome) 6/21/2010    Primary dysmenorrhea 6/21/2010    Pyelonephritis     Raynaud's syndrome     Rosacea     Thrombocytosis ([facility] [address] 3/22/2016    Dr Rufus Muñoz 2015    Vegetarian     Vitamin D deficiency 12/13/2011         ROS:  Comprehensive review of systems negative except for as noted above. Objective:       Meds:  Current Outpatient Prescriptions   Medication Sig Dispense Refill    levonorgestrel-ethinyl estradiol (SEASONALE) 0.15 mg-30 mcg 3MPk Take 1 Tab by mouth daily. 91 Tab 3    ALPRAZolam (XANAX) 0.5 mg tablet TAKE 1/2 TABLET BY MOUTH EVERY NIGHT AT BEDTIME IF NEEDED FOR ANXIETY 30 Tab 3    sertraline (ZOLOFT) 50 mg tablet TAKE 1 AND 1/2 TABLETS BY MOUTH DAILY 135 Tab 3    cyclobenzaprine (FLEXERIL) 10 mg tablet Take 10 mg by mouth as needed for Muscle Spasm(s). Takes a few x a month max      SLO-NIACIN PO Take 500 mg by mouth daily.  DOCOSAHEXANOIC ACID/EPA (FISH OIL PO) Take 1,000 mg by mouth daily.  multivitamin (ONE A DAY) tablet Take 1 Tab by mouth daily.  cholecalciferol (VITAMIN D3) 1,000 unit cap Take  by mouth daily.  loratadine (CLARITIN) 10 mg tablet Take 10 mg by mouth daily.  albuterol (PROVENTIL HFA, VENTOLIN HFA, PROAIR HFA) 90 mcg/actuation inhaler Take 2 Puffs by inhalation every six (6) hours as needed for Wheezing. 1 Inhaler 2    aspirin delayed-release 81 mg tablet Take  by mouth daily. Exam:  Visit Vitals    BP (!) 142/92    Pulse 88    Resp 18    Wt 92.5 kg (204 lb)    SpO2 98%    BMI 31.71 kg/m2     NEUROLOGICAL EXAM:  General: Awake, alert, speech fluent. Oriented x 3. CN: PERRL, EOMI without nystagmus, VFF to confrontation, facial sensation and strength are normal and symmetric, hearing is intact to finger rub bilaterally, palate and tongue movements are intact and symmetric. Motor: Normal tone, bulk and strength bilaterally. Reflexes: 2/4 and symmetric, plantar stimulation is flexor. Coordination: FNF, ITZ, HTS intact.   No bradykinesia/rigidity or notable action/postural/resting tremor. Sensation: LT intact throughout. Gait: Normal-based and steady. LABS  Results for orders placed or performed in visit on 04/10/17   CBC WITH AUTOMATED DIFF   Result Value Ref Range    WBC 10.0 3.4 - 10.8 x10E3/uL    RBC 4.38 3.77 - 5.28 x10E6/uL    HGB 12.8 11.1 - 15.9 g/dL    HCT 38.8 34.0 - 46.6 %    MCV 89 79 - 97 fL    MCH 29.2 26.6 - 33.0 pg    MCHC 33.0 31.5 - 35.7 g/dL    RDW 14.2 12.3 - 15.4 %    PLATELET 813 (H) 135 - 379 x10E3/uL    NEUTROPHILS 54 %    Lymphocytes 39 %    MONOCYTES 4 %    EOSINOPHILS 3 %    BASOPHILS 0 %    ABS. NEUTROPHILS 5.5 1.4 - 7.0 x10E3/uL    Abs Lymphocytes 3.9 (H) 0.7 - 3.1 x10E3/uL    ABS. MONOCYTES 0.4 0.1 - 0.9 x10E3/uL    ABS. EOSINOPHILS 0.3 0.0 - 0.4 x10E3/uL    ABS. BASOPHILS 0.0 0.0 - 0.2 x10E3/uL    IMMATURE GRANULOCYTES 0 %    ABS. IMM. GRANS. 0.0 0.0 - 0.1 x10E3/uL   LIPID PANEL   Result Value Ref Range    Cholesterol, total 226 (H) 100 - 199 mg/dL    Triglyceride 154 (H) 0 - 149 mg/dL    HDL Cholesterol 49 >39 mg/dL    VLDL, calculated 31 5 - 40 mg/dL    LDL, calculated 146 (H) 0 - 99 mg/dL   METABOLIC PANEL, COMPREHENSIVE   Result Value Ref Range    Glucose 87 65 - 99 mg/dL    BUN 12 6 - 24 mg/dL    Creatinine 0.68 0.57 - 1.00 mg/dL    GFR est non- >59 mL/min/1.73    GFR est  >59 mL/min/1.73    BUN/Creatinine ratio 18 9 - 23    Sodium 132 (L) 134 - 144 mmol/L    Potassium 5.0 3.5 - 5.2 mmol/L    Chloride 96 96 - 106 mmol/L    CO2 20 18 - 29 mmol/L    Calcium 9.1 8.7 - 10.2 mg/dL    Protein, total 6.3 6.0 - 8.5 g/dL    Albumin 4.1 3.5 - 5.5 g/dL    GLOBULIN, TOTAL 2.2 1.5 - 4.5 g/dL    A-G Ratio 1.9 1.2 - 2.2    Bilirubin, total 0.3 0.0 - 1.2 mg/dL    Alk.  phosphatase 57 39 - 117 IU/L    AST (SGOT) 17 0 - 40 IU/L    ALT (SGPT) 18 0 - 32 IU/L   TSH 3RD GENERATION   Result Value Ref Range    TSH 2.870 0.450 - 4.500 uIU/mL   HEMOGLOBIN A1C WITH EAG   Result Value Ref Range    Hemoglobin A1c 5.7 (H) 4.8 - 5.6 % Estimated average glucose 117 mg/dL   URINALYSIS W/MICROSCOPIC   Result Value Ref Range    Specific Gravity 1.008 1.005 - 1.030    pH (UA) 6.5 5.0 - 7.5    Color Yellow Yellow    Appearance Clear Clear    Leukocyte Esterase 1+ (A) Negative    Protein Negative Negative/Trace    Glucose Negative Negative    Ketone Negative Negative    Blood Trace (A) Negative    Bilirubin Negative Negative    Urobilinogen 0.2 0.2 - 1.0 mg/dL    Nitrites Negative Negative    Microscopic Examination See additional order    MICROSCOPIC EXAMINATION   Result Value Ref Range    WBC 0-5 0 - 5 /hpf    RBC 0-2 0 - 2 /hpf    Epithelial cells 0-10 0 - 10 /hpf    Casts None seen None seen /lpf    Bacteria Few None seen/Few   CVD REPORT   Result Value Ref Range    INTERPRETATION Note    PAP IG, HPV AND RFX HPV 92/48,96([id number])   Result Value Ref Range    Diagnosis Comment     Specimen adequacy Comment     Clinician provided ICD10 Comment     Performed by: Comment     . [place] Note: Comment     Test methodology Comment     HPV DNA Probe, High Risk Negative Negative       IMAGING:  MRI Results (most recent):    Results from Hospital Encounter encounter on 04/21/16   MRI BRAIN W WO CONT   Narrative **Final Report**      ICD Codes / Adm. Diagnosis: 238.71  339.12 / Essential thrombocythemia ([address]    Chronic tension type headach  Examination:  MR BRAIN W AND WO CON  - [id number] - Apr 21 2016  8:08PM  Accession No:  [id number]  Reason:  headaches, dizziness, thrombocytosis      REPORT:  EXAM:  MR BRAIN W AND WO CON    INDICATION:    headaches, dizziness, thrombocytosis    COMPARISON:  None. CONTRAST: 7.5 ml Gadavist    TECHNIQUE:    MR imaging of the brain was performed with sagittal T1, axial T1, T2, FLAIR,   GRE, DWI/ADC; multiplanar T1 images prior to and following uneventful   intravenous contrast administration. FINDINGS: There is an incidental septum cavum pellucidum. The ventricles are   normal in size.  There are several scattered small foci of subcortical white   matter disease likely related to minimal chronic small vessel ischemic   disease. There is no evidence of mass, hemorrhage, acute infarct or abnormal   extra-axial fluid collection. Normal appearing flow-voids are present in   the vertebral, basilar and carotid artery systems. The craniocervical   junction is normal.  The structures at the cranial base including paranasal   sinuses and mastoid air cells are unremarkable. There is no abnormal   parenchymal or meningeal enhancement. IMPRESSION:    Minimal white matter disease likely chronic small vessel ischemic disease. No evidence of acute process. Signing/Reading Doctor: Margot Guerrero ([id number])    Approved: Margot Guerrero ([id number])  Apr 22 2016  8:45AM                                          Assessment:     Encounter Diagnoses     ICD-10-CM ICD-9-CM   1. Excessive physiologic tremor G25.0 333.1   2. Subjective memory complaints R41.89 780.93   3. Anxiety disorder, unspecified type F41.9 27.00   52year old female presenting with new complaints of b/l UE tremor, stuttering and recent episode of difficulty navigating to the grocery store. Previously followed for migraines which have resolved. Neurological examination is non-focal today without s/s of Parkinsonism or Essential tremor, although she does endorse a FHx father with tremor . Suspect enhanced physiologic tremor which is non-pathologic. She was reassured that currently there is no evidence to suggest an underlying neurodegenerative disorder. Likewise, memory does not appear significantly altered on interview today. This may be evaluated with formal neuropsychologic testing in the future to assess for organic memory dysfunction vs pseudodementia associated with comorbid anxiety/depression. She does endorse several recent stressors which may be contributing.     Will complete labs in search of reversible metabolic derangements which may be contributing to above sx. She was encouraged to f/u with any new/worsening sx. Plan:   TSH, B12  If continued cognitive complaints, may consider Neuropsychologic testing    Follow-up Disposition:  Return if symptoms worsen or fail to improve. Signed:  Nathan Sepulveda,   10/17/2017    The duration of this appointment visit was 25 minutes of face-to-face time with the patient. At least 50% of this time was spent in counseling, explanation of diagnosis, planning of further management, and coordination of care.

## 2017-10-17 NOTE — MR AVS SNAPSHOT
Visit Information Date & Time Provider Department Dept. Phone Encounter #  
 10/17/2017  8:40 AM King Hawa DO [facility] at [facility] [phone] Follow-up Instructions Return if symptoms worsen or fail to improve. Your Appointments 4/11/2018  9:00 AM  
COMPLETE PHYSICAL with Fab Cheung MD  
[facility]) Appt Note: cpe  
 [phone]  
[phone]  
  
   
 [address] [phone] Upcoming Health Maintenance Date Due INFLUENZA AGE 9 TO ADULT 8/1/2017 Pneumococcal 19-64 Highest Risk (2 of 3 - PCV13) 4/10/2018 PAP AKA CERVICAL CYTOLOGY 4/10/2020 DTaP/Tdap/Td series (2 - Td) 2/17/2024 Allergies as of 10/17/2017  Review Complete On: 10/17/2017 By: King Hawa DO Severity Noted Reaction Type Reactions Crestor [Rosuvastatin]  10/12/2015    Myalgia Insomnia and muscle pain Flonase [Fluticasone]  03/22/2016    Other (comments) Cataracts enlarged Hydrochlorothiazide  03/05/2015    Other (comments) Insomnia, headache, ringing in ears, anxious Nortriptyline  04/10/2017    Other (comments) Increased depression Other Medication  01/09/2012    Other (comments) Pt is avoiding Nasal CS d/t cataracts Sulfa (Sulfonamide Antibiotics)  04/28/2010    Hives Current Immunizations  Reviewed on 4/12/2016 Name Date Influenza Vaccine 11/12/2014 Meningococcal Vaccine 8/7/2002 PPD 8/13/1999, 6/19/1998 Rabies Vaccine 9/28/2003 TD Vaccine 9/28/2003, 6/19/1998 Tdap 2/17/2014 Not reviewed this visit You Were Diagnosed With   
  
 Codes Comments Excessive physiologic tremor    -  Primary ICD-10-CM: G25.0 ICD-9-CM: 333.1 Subjective memory complaints     ICD-10-CM: R41.89 ICD-9-CM: 780.93 Anxiety disorder, unspecified type     ICD-10-CM: F41.9 ICD-9-CM: 300.00 Vitals BP Pulse Resp Weight(growth percentile) SpO2 BMI  
 (!) 142/92 88 18 204 lb (92.5 kg) 98% 31.71 kg/m2 OB Status Smoking Status Medically Induced Former Smoker Vitals History BMI and BSA Data Body Mass Index Body Surface Area 31.71 kg/m 2 2.09 m 2 Preferred Pharmacy Pharmacy Name Phone [phone] [phone] Your Updated Medication List  
  
   
This list is accurate as of: 10/17/17  8:58 AM.  Always use your most recent med list.  
  
  
  
  
 albuterol 90 mcg/actuation inhaler Commonly known as:  PROVENTIL HFA, VENTOLIN HFA, PROAIR HFA Take 2 Puffs by inhalation every six (6) hours as needed for Wheezing. ALPRAZolam 0.5 mg tablet Commonly known as:  XANAX  
TAKE 1/2 TABLET BY MOUTH EVERY NIGHT AT BEDTIME IF NEEDED FOR ANXIETY  
  
 aspirin delayed-release 81 mg tablet Take  by mouth daily. CLARITIN 10 mg tablet Generic drug:  loratadine Take 10 mg by mouth daily. cyclobenzaprine 10 mg tablet Commonly known as:  FLEXERIL Take 10 mg by mouth as needed for Muscle Spasm(s). Takes a few x a month max FISH OIL PO Take 1,000 mg by mouth daily. garlic Cap Take  by mouth.  
  
 levonorgestrel-ethinyl estradiol 0.15 mg-30 mcg 3mpk Commonly known as:  Roberson Ke Take 1 Tab by mouth daily. multivitamin tablet Commonly known as:  ONE A DAY Take 1 Tab by mouth daily. sertraline 50 mg tablet Commonly known as:  ZOLOFT  
TAKE 1 AND 1/2 TABLETS BY MOUTH DAILY  
  
 SLO-NIACIN PO Take 500 mg by mouth daily. VITAMIN D3 1,000 unit Cap Generic drug:  cholecalciferol Take  by mouth daily. We Performed the Following TSH 3RD GENERATION [15980 CPT(R)] VITAMIN B12 [id number] CPT(R)] Follow-up Instructions Return if symptoms worsen or fail to improve. Patient Instructions A Healthy Lifestyle: Care Instructions Your Care Instructions A healthy lifestyle can help you feel good, stay at a healthy weight, and have plenty of energy for both work and play. A healthy lifestyle is something you can share with your whole family. A healthy lifestyle also can lower your risk for serious health problems, such as high blood pressure, heart disease, and diabetes. You can follow a few steps listed below to improve your health and the health of your family. Follow-up care is a key part of your treatment and safety. Be sure to make and go to all appointments, and call your doctor if you are having problems. Its also a good idea to know your test results and keep a list of the medicines you take. How can you care for yourself at home? · Do not eat too much sugar, fat, or fast foods. You can still have dessert and treats now and then. The goal is moderation. · Start small to improve your eating habits. Pay attention to portion sizes, drink less juice and soda pop, and eat more fruits and vegetables. ¨ Eat a healthy amount of food. A 3-ounce serving of meat, for example, is about the size of a deck of cards. Fill the rest of your plate with vegetables and whole grains. ¨ Limit the amount of soda and sports drinks you have every day. Drink more water when you are thirsty. ¨ Eat at least 5 servings of fruits and vegetables every day. It may seem like a lot, but it is not hard to reach this goal. A serving or helping is 1 piece of fruit, 1 cup of vegetables, or 2 cups of leafy, raw vegetables. Have an apple or some carrot sticks as an afternoon snack instead of a candy bar. Try to have fruits and/or vegetables at every meal. 
· Make exercise part of your daily routine. You may want to start with simple activities, such as walking, bicycling, or slow swimming. Try to be active 30 to 60 minutes every day.  You do not need to do all 30 to 60 minutes all at once. For example, you can exercise 3 times a day for 10 or 20 minutes. Moderate exercise is safe for most people, but it is always a good idea to talk to your doctor before starting an exercise program. 
· Keep moving. Lennon Hu the lawn, work in the garden, or [employer]. Take the stairs instead of the elevator at work. · If you smoke, quit. People who smoke have an increased risk for heart attack, stroke, cancer, and other lung illnesses. Quitting is hard, but there are ways to boost your chance of quitting tobacco for good. ¨ Use nicotine gum, patches, or lozenges. ¨ Ask your doctor about stop-smoking programs and medicines. ¨ Keep trying. In addition to reducing your risk of diseases in the future, you will notice some benefits soon after you stop using tobacco. If you have shortness of breath or asthma symptoms, they will likely get better within a few weeks after you quit. · Limit how much alcohol you drink. Moderate amounts of alcohol (up to 2 drinks a day for men, 1 drink a day for women) are okay. But drinking too much can lead to liver problems, high blood pressure, and other health problems. Family health If you have a family, there are many things you can do together to improve your health. · Eat meals together as a family as often as possible. · Eat healthy foods. This includes fruits, vegetables, lean meats and dairy, and whole grains. · Include your family in your fitness plan. Most people think of activities such as jogging or tennis as the way to fitness, but there are many ways you and your family can be more active. Anything that makes you breathe hard and gets your heart pumping is exercise. Here are some tips: 
¨ Walk to do errands or to take your child to school or the bus. ¨ Go for a family bike ride after dinner instead of watching TV. Where can you learn more? Go to http://margaret-dano.info/. Enter L353 in the search box to learn more about \"A Healthy Lifestyle: Care Instructions. \" Current as of: July 26, 2016 Content Version: 11.3 © 2529-7959 [employer]. Care instructions adapted under license by [employer] (which disclaims liability or warranty for this information). If you have questions about a medical condition or this instruction, always ask your healthcare professional. [address] any warranty or liability for your use of this information. Introducing [facility]! Dear Evelia Licea: Thank you for requesting a Med[employer] account. Our records indicate that you already have an active MedAware account. You can access your account anytime at [email or web address] Did you know that you can access your hospital and ER discharge instructions at any time in MedAware? You can also review all of your test results from your hospital stay or ER visit. Additional Information If you have questions, please visit the Frequently Asked Questions section of the MedAware website at [email or web address] Remember, MedAware is NOT to be used for urgent needs. For medical emergencies, dial 911. Now available from your iPhone and Android! Please provide this summary of care documentation to your next provider. Your primary care clinician is listed as SUHA COWART. If you have any questions after today's visit, please call [phone].

## 2017-10-17 NOTE — PATIENT INSTRUCTIONS

## 2017-10-18 LAB
TSH SERPL DL<=0.005 MIU/L-ACNC: 2.68 UIU/ML (ref 0.45–4.5)
VIT B12 SERPL-MCNC: 539 PG/ML (ref 211–946)

## 2017-11-06 RX ORDER — ALPRAZOLAM 0.5 MG/1
TABLET ORAL
Qty: 30 TAB | OUTPATIENT
Start: 2017-11-06

## 2017-11-06 RX ORDER — ALPRAZOLAM 0.5 MG/1
TABLET ORAL
Qty: 20 TAB | Refills: 0 | OUTPATIENT
Start: 2017-11-06 | End: 2017-11-29 | Stop reason: SDUPTHER

## 2017-11-06 NOTE — TELEPHONE ENCOUNTER
Called pt to let her know of Dr Randall Found note/request.  I have booked pt for 11/29/17 @ 8:30. I did recommend that she schedule her 6 month f/u appt before she leaves in the future. Pt states understanding. Xanax called in.

## 2017-11-06 NOTE — TELEPHONE ENCOUNTER
Patient last seen 4-2017. Not scheduled for next f/u til 4-2018. She needs to have a controlled med check appt now asap. She also does not have a CSA on file. Please call pt and advise of new regulations and guidelines and get her in for a med check. Pull  and may phone in a few tabs as ordered for now.        Future Appointments:  4/11/2018  9:00 AM    Rosi Castro MD  [facility]

## 2017-11-29 ENCOUNTER — OFFICE VISIT (OUTPATIENT)
Dept: FAMILY MEDICINE CLINIC | Age: 47
End: 2017-11-29

## 2017-11-29 VITALS
RESPIRATION RATE: 18 BRPM | HEART RATE: 78 BPM | DIASTOLIC BLOOD PRESSURE: 92 MMHG | HEIGHT: 67 IN | SYSTOLIC BLOOD PRESSURE: 142 MMHG | TEMPERATURE: 98.5 F | BODY MASS INDEX: 31.2 KG/M2 | WEIGHT: 198.8 LBS | OXYGEN SATURATION: 98 %

## 2017-11-29 DIAGNOSIS — Z51.81 ENCOUNTER FOR MEDICATION MONITORING: ICD-10-CM

## 2017-11-29 DIAGNOSIS — R73.03 PREDIABETES: ICD-10-CM

## 2017-11-29 DIAGNOSIS — K76.0 NAFL (NONALCOHOLIC FATTY LIVER): ICD-10-CM

## 2017-11-29 DIAGNOSIS — I10 BENIGN ESSENTIAL HYPERTENSION: Primary | ICD-10-CM

## 2017-11-29 DIAGNOSIS — E78.2 HYPERCHOLESTEROLEMIA WITH HYPERTRIGLYCERIDEMIA: ICD-10-CM

## 2017-11-29 DIAGNOSIS — F43.9 STRESS: ICD-10-CM

## 2017-11-29 DIAGNOSIS — Z79.899 CONTROLLED SUBSTANCE AGREEMENT SIGNED: ICD-10-CM

## 2017-11-29 DIAGNOSIS — F41.0 ANXIETY ATTACK: ICD-10-CM

## 2017-11-29 DIAGNOSIS — F32.A CHRONIC DEPRESSION: ICD-10-CM

## 2017-11-29 RX ORDER — ALPRAZOLAM 0.5 MG/1
0.25 TABLET ORAL
Qty: 30 TAB | Refills: 3 | OUTPATIENT
Start: 2017-11-29 | End: 2018-05-24 | Stop reason: SDUPTHER

## 2017-11-29 RX ORDER — SPIRONOLACTONE 50 MG/1
50 TABLET, FILM COATED ORAL DAILY
Qty: 30 TAB | Refills: 2 | Status: SHIPPED | OUTPATIENT
Start: 2017-11-29 | End: 2018-02-21 | Stop reason: SDUPTHER

## 2017-11-29 RX ORDER — MULTIVIT WITH MINERALS/HERBS
1 TABLET ORAL DAILY
COMMUNITY
End: 2021-11-15 | Stop reason: ALTCHOICE

## 2017-11-29 NOTE — PATIENT INSTRUCTIONS
High Blood Pressure: Care Instructions  Your Care Instructions    If your blood pressure is usually above 140/90, you have high blood pressure, or hypertension. That means the top number is 140 or higher or the bottom number is 90 or higher, or both. Despite what a lot of people think, high blood pressure usually doesn't cause headaches or make you feel dizzy or lightheaded. It usually has no symptoms. But it does increase your risk for heart attack, stroke, and kidney or eye damage. The higher your blood pressure, the more your risk increases. Your doctor will give you a goal for your blood pressure. Your goal will be based on your health and your age. An example of a goal is to keep your blood pressure below 140/90. Lifestyle changes, such as eating healthy and being active, are always important to help lower blood pressure. You might also take medicine to reach your blood pressure goal.  Follow-up care is a key part of your treatment and safety. Be sure to make and go to all appointments, and call your doctor if you are having problems. It's also a good idea to know your test results and keep a list of the medicines you take. How can you care for yourself at home? Medical treatment  · If you stop taking your medicine, your blood pressure will go back up. You may take one or more types of medicine to lower your blood pressure. Be safe with medicines. Take your medicine exactly as prescribed. Call your doctor if you think you are having a problem with your medicine. · Talk to your doctor before you start taking aspirin every day. Aspirin can help certain people lower their risk of a heart attack or stroke. But taking aspirin isn't right for everyone, because it can cause serious bleeding. · See your doctor regularly. You may need to see the doctor more often at first or until your blood pressure comes down.   · If you are taking blood pressure medicine, talk to your doctor before you take decongestants or anti-inflammatory medicine, such as ibuprofen. Some of these medicines can raise blood pressure. · Learn how to check your blood pressure at home. Lifestyle changes  · Stay at a healthy weight. This is especially important if you put on weight around the waist. Losing even 10 pounds can help you lower your blood pressure. · If your doctor recommends it, get more exercise. Walking is a good choice. Bit by bit, increase the amount you walk every day. Try for at least 30 minutes on most days of the week. You also may want to swim, bike, or do other activities. · Avoid or limit alcohol. Talk to your doctor about whether you can drink any alcohol. · Try to limit how much sodium you eat to less than 2,300 milligrams (mg) a day. Your doctor may ask you to try to eat less than 1,500 mg a day. · Eat plenty of fruits (such as bananas and oranges), vegetables, legumes, whole grains, and low-fat dairy products. · Lower the amount of saturated fat in your diet. Saturated fat is found in animal products such as milk, cheese, and meat. Limiting these foods may help you lose weight and also lower your risk for heart disease. · Do not smoke. Smoking increases your risk for heart attack and stroke. If you need help quitting, talk to your doctor about stop-smoking programs and medicines. These can increase your chances of quitting for good. When should you call for help? Call 911 anytime you think you may need emergency care. This may mean having symptoms that suggest that your blood pressure is causing a serious heart or blood vessel problem. Your blood pressure may be over 180/110. ? For example, call 911 if:  ? · You have symptoms of a heart attack. These may include:  ¨ Chest pain or pressure, or a strange feeling in the chest.  ¨ Sweating. ¨ Shortness of breath. ¨ Nausea or vomiting.   ¨ Pain, pressure, or a strange feeling in the back, neck, jaw, or upper belly or in one or both shoulders or arms.  ¨ Lightheadedness or sudden weakness. ¨ A fast or irregular heartbeat. ? · You have symptoms of a stroke. These may include:  ¨ Sudden numbness, tingling, weakness, or loss of movement in your face, arm, or leg, especially on only one side of your body. ¨ Sudden vision changes. ¨ Sudden trouble speaking. ¨ Sudden confusion or trouble understanding simple statements. ¨ Sudden problems with walking or balance. ¨ A sudden, severe headache that is different from past headaches. ? · You have severe back or belly pain. ?Do not wait until your blood pressure comes down on its own. Get help right away. ?Call your doctor now or seek immediate care if:  ? · Your blood pressure is much higher than normal (such as 180/110 or higher), but you don't have symptoms. ? · You think high blood pressure is causing symptoms, such as:  ¨ Severe headache. ¨ Blurry vision. ? Watch closely for changes in your health, and be sure to contact your doctor if:  ? · Your blood pressure measures 140/90 or higher at least 2 times. That means the top number is 140 or higher or the bottom number is 90 or higher, or both. ? · You think you may be having side effects from your blood pressure medicine. ? · Your blood pressure is usually normal, but it goes above normal at least 2 times. Where can you learn more? Go to http://margaret-dano.info/. Enter P658 in the search box to learn more about \"High Blood Pressure: Care Instructions. \"  Current as of: September 21, 2016  Content Version: 11.4  © 2778-1968 [employer]. Care instructions adapted under license by [employer] (which disclaims liability or warranty for this information). If you have questions about a medical condition or this instruction, always ask your healthcare professional. [place] [address] any warranty or liability for your use of this information.            Body Mass Index: Care Instructions  Your Care Instructions    Body mass index (BMI) can help you see if your weight is raising your risk for health problems. It uses a formula to compare how much you weigh with how tall you are. · A BMI lower than 18.5 is considered underweight. · A BMI between 18.5 and 24.9 is considered healthy. · A BMI between 25 and 29.9 is considered overweight. A BMI of 30 or higher is considered obese. If your BMI is in the normal range, it means that you have a lower risk for weight-related health problems. If your BMI is in the overweight or obese range, you may be at increased risk for weight-related health problems, such as high blood pressure, heart disease, stroke, arthritis or joint pain, and diabetes. If your BMI is in the underweight range, you may be at increased risk for health problems such as fatigue, lower protection (immunity) against illness, muscle loss, bone loss, hair loss, and hormone problems. BMI is just one measure of your risk for weight-related health problems. You may be at higher risk for health problems if you are not active, you eat an unhealthy diet, or you drink too much alcohol or use tobacco products. Follow-up care is a key part of your treatment and safety. Be sure to make and go to all appointments, and call your doctor if you are having problems. It's also a good idea to know your test results and keep a list of the medicines you take. How can you care for yourself at home? · Practice healthy eating habits. This includes eating plenty of fruits, vegetables, whole grains, lean protein, and low-fat dairy. · If your doctor recommends it, get more exercise. Walking is a good choice. Bit by bit, increase the amount you walk every day. Try for at least 30 minutes on most days of the week. · Do not smoke. Smoking can increase your risk for health problems. If you need help quitting, talk to your doctor about stop-smoking programs and medicines.  These can increase your chances of quitting for good. · Limit alcohol to 2 drinks a day for men and 1 drink a day for women. Too much alcohol can cause health problems. If you have a BMI higher than 25  · Your doctor may do other tests to check your risk for weight-related health problems. This may include measuring the distance around your waist. A waist measurement of more than 40 inches in men or 35 inches in women can increase the risk of weight-related health problems. · Talk with your doctor about steps you can take to stay healthy or improve your health. You may need to make lifestyle changes to lose weight and stay healthy, such as changing your diet and getting regular exercise. If you have a BMI lower than 18.5  · Your doctor may do other tests to check your risk for health problems. · Talk with your doctor about steps you can take to stay healthy or improve your health. You may need to make lifestyle changes to gain or maintain weight and stay healthy, such as getting more healthy foods in your diet and doing exercises to build muscle. Where can you learn more? Go to http://margaret-dano.info/. Enter S176 in the search box to learn more about \"Body Mass Index: Care Instructions. \"  Current as of: October 13, 2016  Content Version: 11.4  © 1265-0910 [employer], [employer]. Care instructions adapted under license by [employer] (which disclaims liability or warranty for this information). If you have questions about a medical condition or this instruction, always ask your healthcare professional. [place] [address] any warranty or liability for your use of this information. Work-Life Balance: Care Instructions  Your Care Instructions    Do you ever feel like there is not enough time to do all of the things you have to do, and no time at all for the things you enjoy? If so, you are not alone.   On average, people in the [place] have worked more and more hours each year since 5. But in recent years, fewer people say they want to take on more at work, even if they would get promoted or get paid more money. More and more workers say they want time to spend with their families and to do things that are important to them. Do you ever feel:  · That you always have more and more work to do at your job? · That too many people depend on you every day? · That you never have enough time for your family or friends? · That you never have time for hobbies or things you enjoy? · That each second of your day is scheduled? If you answered \"yes\" to any of these questions, take steps at work and at home to get your life into balance. Follow-up care is a key part of your treatment and safety. Be sure to make and go to all appointments, and call your doctor if you are having problems. How can you care for yourself at home? Manage your time  · Focus on the important things. Taking on too much can wear you out. Look at how you spend your time, and redirect your focus. Learn to say \"no\" and let go of things that do not matter. · Set one small goal at a time. Use a day planner. Break large projects into smaller ones. · Ask for help. Let your children, your spouse, your coworkers, and other people in your life help you get things done. · Leave your job at the office. If you give up free time to get more work done, you may pay for it with stress. If your job offers a flexible work schedule, use it to fit your own work style. For instance, come in earlier to have a longer lunch break, or make time for a yoga class or workout during your workday. · Unplug. Do not let technology (such as your cell phone or the Internet) erase the line between your time and your employer's time. Lower job stress  Job stress causes trouble at work and at home. At work, you may worry about things you have not had time to do at home. At home, you may worry about your job.  This cycle upsets your work-life balance. Lowering your job stress can get your life back in balance. Job stress can be caused by:  · Pressure and deadlines. · Heavy workloads or long hours. · Not being allowed to make decisions. · Health and safety hazards. · Feeling you may lose your job. · Unclear or changing job duties. · Too much responsibility. · Work that is very tiring or boring. Do any of these things bother you? Consider talking with your boss to change things. There are some things that you may not be able to control. But even a few small changes might help lower your stress. Take advantage of programs at work  Businesses make money and are better off in other ways if their employees are healthy and happy. For this reason, many companies have programs to help balance work life and home life. These programs may include:  · Flexible schedules and hours. · Time off for family reasons, education, or community service. · Being able to work from home. · Employee assistance programs to provide counseling. · Child-care programs. Check to see if your company has any of these or other programs that could help you. If not, consider talking to your boss about why work-life balance programs make good business sense. Even if your company does not start an official program, you may be able to get flexible hours, time off, or the ability to do some work from home. Know when to quit  If you are truly unhappy because of a stressful job, and if the suggestions here have not worked, it may be time to think about changing jobs or changing careers. But before you quit, take time to research your options. Where can you learn more? Go to http://mragaret-dano.info/. Enter V703 in the search box to learn more about \"Work-Life Balance: Care Instructions. \"  Current as of: July 26, 2016  Content Version: 11.4  © 5451-0326 [employer], [employer].  Care instructions adapted under license by [employer] (which disclaims liability or warranty for this information). If you have questions about a medical condition or this instruction, always ask your healthcare professional. [place] [address] any warranty or liability for your use of this information.

## 2017-11-29 NOTE — PROGRESS NOTES
HISTORY OF PRESENT ILLNESS   HPI  Fasting follow up hypertension, hypercholesterolemia, prediabetes, depression, anxiety, labs and medication check. 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 It is getting worse. 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 She feels as if her BP's have been running high but she has not been checking them. Getting more tension headaches. The past 2 days feels like a fluttering in her ear. No ear pain. No CP, palpitations, SOB, dizziness, vision changes. The Zoloft is working really well for control of depression. She takes 1/2 Xanax qhs so that she wont have racing, uncontrollable thoughts, nervousness, anxiety w/ panic attacks and insomnia. This enables her to fall asleep and stay asleep soundly. Awakens feeling refreshed in the AM.   Otherwise physically feeling pretty well in general.  She started walking her dog 2 x a day which has been very therapeutic for her and she is losing wt in the process. REVIEW OF SYMPTOMS     Review of Systems   Constitutional: Negative. HENT: Negative for ear pain. Eyes: Negative. Respiratory: Negative. Cardiovascular: Negative. Gastrointestinal: Negative. Skin:        Shaves chin/facial hair. States she has always been a hairy person all over. Denies acne. Neurological: Negative for dizziness. Psychiatric/Behavioral: Negative for depression.  The patient is nervous/anxious and has insomnia.            PROBLEM LIST/MEDICAL HISTORY      Problem List  Date Reviewed: 11/29/2017          Codes Class Noted    Benign essential hypertension ICD-10-CM: I10  ICD-9-CM: 401.1  2/24/2015    Overview Signed 2/24/2015 10:21 AM by Anne Berg MD     [id number]             Prediabetes ICD-10-CM: R73.03  ICD-9-CM: 790.29  4/23/2017    Overview Signed 4/23/2017  9:16 PM by Anne Berg MD     Mild [id number]             Muscle contraction headaches & Neck Muscle Spasms ICD-10-CM: G44.209  ICD-9-CM: 307.81  4/10/2017        Thrombocytosis ([address] ICD-10-CM: D47.3  ICD-9-CM: 238.71  3/22/2016    Overview Signed 3/22/2016 10:08 AM by MD Dr Taisha Conn 2015             Lymphocytosis ICD-10-CM: Y30.497  ICD-9-CM: 288.61  3/22/2016    Overview Signed 3/22/2016 10:08 AM by MD Dr Taisha Conn, 2015             Microhematuria ICD-10-CM: R31.29  ICD-9-CM: 599.72  12/15/2015    Overview Signed 12/15/2015  3:47 PM by Cheryle Deleon MD     [phone] [facility] Dr Cleve De Luna. KUB neg excpet small stone vs phlebolith             NAFL (nonalcoholic fatty liver) CGL-21-TD: K76.0  ICD-9-CM: 571.8  8/27/2015    Overview Signed 8/27/2015  8:45 AM by MD Dr Anshul Conn             Focal nodular hyperplasia of liver ICD-10-CM: K76.89  ICD-9-CM: 573.8  5/10/2015    Overview Signed 8/27/2015  8:45 AM by Cheryle Deleon MD     GI Dr Anshul Strong and Onc Dr Taisha Bynum             Vitamin D deficiency ICD-10-CM: E55.9  ICD-9-CM: 268.9  12/13/2011    Overview Signed 12/13/2011  1:16 PM by Cheryle Deleon MD     10/2011             Hypercholesterolemia with high triglycerides ICD-10-CM: E78.2  ICD-9-CM: 272.2  12/13/2011        Chronic Mechanical back pain ICD-10-CM: M54.9  ICD-9-CM: 724.5  10/25/2011        History of pyelonephritis, 1992 ICD-10-CM: Z87.448  ICD-9-CM: V13.02  10/25/2011        Allergic rhinitis ICD-10-CM: J30.9  ICD-9-CM: 477.9  10/25/2011    Overview Signed 10/25/2011  9:45 AM by Cheryle Deleon MD     Worse in Spring             Cataract of right eye ICD-10-CM: H26.9  ICD-9-CM: 366.9  10/25/2011    Overview Signed 10/25/2011  9:46 AM by Cheryle Deleon MD     Opth VEI, Dr Jas Tejada             S/P Dog bite, 2003 ICD-10-CM: W54. 0XXA  ICD-9-CM: E906.0  10/25/2011    Overview Signed 10/25/2011  9:46 AM by Cheryle Deleon MD     Td and Rabies Series             Rosacea ICD-10-CM: L71.9  ICD-9-CM: 695.3  10/25/2011        Fibrocystic breast changes ICD-10-CM: N60.19  ICD-9-CM: 610.1  10/25/2011        Raynaud's syndrome ICD-10-CM: I73.00  ICD-9-CM: 443.0  Unknown    Overview Signed 8/27/2015  8:48 AM by Nikki Gómez MD     Autoimmune workup Dr Amanda Hurst Spring 2015             PMS (premenstrual syndrome) ICD-10-CM: N94.3  ICD-9-CM: 625.4  6/21/2010        Depression ICD-10-CM: F32.9  ICD-9-CM: 488  6/21/2010        Asthma, mild intermittent ICD-10-CM: J45.909  ICD-9-CM: 493.90  6/21/2010    Overview Signed 10/25/2011  9:45 AM by Nikki Gómez MD     Since ~ 20 yo             Primary dysmenorrhea ICD-10-CM: N94.4  ICD-9-CM: 625.3  6/21/2010        Anxiety attacks ICD-10-CM: F41.0  ICD-9-CM: 300.01  6/21/2010    Overview Signed 6/21/2010  8:28 PM by Nikki Gómez MD     March 2010                       PAST SURGICAL HISTORY       Past Surgical History:   Procedure Laterality Date    EKG TREADMILL STRESS TEST  May 2010    Dr Suresh Leong and Dr Brittney hodge for atypical CP; dx Anxiety    EMG TWO EXTREMITIES UPPER  1999    normal    HX OTHER SURGICAL  3/2014 ordered by Dr Sarika Schulte, benign    HX SHOULDER ARTHROSCOPY  1996    left shoulder    US PELV NON OBS  2004    normal    XR SPINE LUMB 2 OR 3 V  1993    normal    XR SPINE THORAC 3 V  1993    normal    XR UPPER GI SERIES W KUB  2000    normal         MEDICATIONS      Current Outpatient Prescriptions   Medication Sig    b complex vitamins tablet Take 1 Tab by mouth daily.  ALPRAZolam (XANAX) 0.5 mg tablet TAKE 1/2 TABLET AT BEDTIME AS NEEDED FOR SLEEP/ANXIETY (Patient taking differently: TAKE 1/2 TABLET AT BEDTIME FOR SLEEP/ANXIETY)    garlic cap Take  by mouth.  levonorgestrel-ethinyl estradiol (SEASONALE) 0.15 mg-30 mcg 3MPk Take 1 Tab by mouth daily.     sertraline (ZOLOFT) 50 mg tablet TAKE 1 AND 1/2 TABLETS BY MOUTH DAILY    cyclobenzaprine (FLEXERIL) 10 mg tablet Take 10 mg by mouth as needed for Muscle Spasm(s). Takes a few x a month max    SLO-NIACIN PO Take 500 mg by mouth daily.  DOCOSAHEXANOIC ACID/EPA (FISH OIL PO) Take 1,000 mg by mouth daily.  multivitamin (ONE A DAY) tablet Take 1 Tab by mouth daily.  cholecalciferol (VITAMIN D3) 1,000 unit cap Take  by mouth daily.  loratadine (CLARITIN) 10 mg tablet Take 10 mg by mouth daily.  albuterol (PROVENTIL HFA, VENTOLIN HFA, PROAIR HFA) 90 mcg/actuation inhaler Take 2 Puffs by inhalation every six (6) hours as needed for Wheezing.  aspirin delayed-release 81 mg tablet Take  by mouth daily. No current facility-administered medications for this visit.            ALLERGIES     Allergies   Allergen Reactions    Crestor [Rosuvastatin] Myalgia     Insomnia and muscle pain    Flonase [Fluticasone] Other (comments)     Cataracts enlarged    Hydrochlorothiazide Other (comments)     Insomnia, headache, ringing in ears, anxious    Nortriptyline Other (comments)     Increased depression    Other Medication Other (comments)     Pt is avoiding Nasal CS d/t cataracts    Sulfa (Sulfonamide Antibiotics) Hives          SOCIAL HISTORY       Social History     Social History    Marital status: SINGLE     Spouse name: N/A    Number of children: 0    Years of education: N/A     Occupational History         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    Smoking status: Former Smoker     Types: Cigarettes     Quit date: 10/25/1991    Smokeless tobacco: Never Used      Comment: smoked lightly for about 5 months    Alcohol use 0.0 oz/week     0 Standard drinks or equivalent per week      Comment: very seldom    Drug use: No    Sexual activity: No     Other Topics Concern    Caffeine Concern Yes     3 12 oz cans of diet Mountain Dews a day    Sleep Concern Yes     was going for sleep eval per Dr Deanna Avalos 2015 to r/o JUAN MANUEL, but she never went    Stress Concern Yes     principal at work is stressful    Weight Concern Yes     working on losing wt    Special Diet No    Exercise Yes     walks dog 2-4 x a day x 3-4k steps each time x 30-60 minutes each     Social History Narrative        IMMUNIZATIONS  Immunization History   Administered Date(s) Administered    Influenza Vaccine 2014, 10/29/2017, 10/29/2017    Meningococcal Vaccine 2002    PPD 1998, 1999    Rabies Vaccine 2003    TD Vaccine 1998, 2003    Tdap 2014         FAMILY HISTORY     Family History   Problem Relation Age of Onset    High Cholesterol Mother      high TG's in the 80's    Hypertension Mother     Thyroid Disease Mother     Other Mother      benign ovarian cysts    Anxiety Mother     Depression Mother     Diabetes Father      type 2    Cancer Father      Hodgkins    Arrhythmia Father 72     Ablation; chemo/radiation induced    Heart Surgery Father 72     stents placed    Anxiety Maternal Grandmother     Heart Disease Maternal Grandmother       80; h/o A. Fib    Stroke Maternal Grandmother     Heart Attack Maternal Grandfather       age 74    Other Paternal Grandmother       GI bleed in her early 63's    Cancer Paternal Grandfather 61     pancreatic and liver cancer,  age 61         VITALS     Visit Vitals    BP (!) 142/92 (BP 1 Location: Left arm, BP Patient Position: Sitting)    Pulse 78    Temp 98.5 °F (36.9 °C) (Oral)    Resp 18    Ht 5' 7.25\" (1.708 m)    Wt 198 lb 12.8 oz (90.2 kg)    SpO2 98%    BMI 30.91 kg/m2          PHYSICAL EXAMINATION     Physical Exam   Constitutional: She is oriented to person, place, and time and well-developed, well-nourished, and in no distress. HENT:   Right Ear: Tympanic membrane normal.   Left Ear: Tympanic membrane normal.   Eyes: Pupils are equal, round, and reactive to light. Neck: Neck supple. No JVD present. No thyromegaly present.    Cardiovascular: Normal rate, regular rhythm and normal heart sounds. Exam reveals no gallop and no friction rub. No murmur heard. Pulmonary/Chest: Effort normal and breath sounds normal.   Abdominal: Soft. Bowel sounds are normal. She exhibits no distension and no mass. There is no tenderness. Musculoskeletal: She exhibits no edema or tenderness. Lymphadenopathy:     She has no cervical adenopathy. Neurological: She is alert and oriented to person, place, and time. No cranial nerve deficit. Coordination normal.   Skin: Skin is warm. Psychiatric: Affect normal.   Slightly anxious today, bouncing legs   Vitals reviewed.              ASSESSMENT & PLAN       ICD-10-CM ICD-9-CM    1. Benign essential hypertension I10 401.1 spironolactone (ALDACTONE) 50 mg tablet      METABOLIC PANEL, COMPREHENSIVE   2. Hypercholesterolemia with high triglycerides E78.2 272.2 LIPID PANEL   3. Prediabetes D91.62 138.18 METABOLIC PANEL, COMPREHENSIVE      HEMOGLOBIN A1C WITH EAG   4. NAFL (nonalcoholic fatty liver) K40.1 829.3 METABOLIC PANEL, COMPREHENSIVE   5. Chronic depression F32.9 311    6. Stress F43.9 V62.89    7. Anxiety attacks F41.0 300.01    8. Controlled substance agreement signed Z79.899 V58.69    9. Encounter for medication monitoring Z51.81 V58.83      Fasting labs today  Reviewed diet, nutrition, exercise and weight control; Patient counseled about current BMI, goals, diet, nutrition, exercise, weight management. Nutrition/meal planning discussed. Monitor weights as directed. Appropriate handouts included with or without corresponding AVS. Reassess at next follow up visit. Commended on her progress thus far  Cardiovascular risk and specific lipid/LDL/BP goals reviewed  Start Aldactone 50 mg every day and monitor interim  BP's   Stress mgt  Reviewed medications, effects and side effects in detail  Further follow up & other recommendations pending review of labs and how she is doing on new BP medication   pulled and reviewed.  No problems noted with Xanax which she takes 1/2 tablet qhs. Last filled 11-6-17 for #20. Rx phoned in as ordered today. Low abuse/misuse potential. Patient counseled and we discussed controlled medications, effects, side effects, indications, risks vs benefits, precautions, potential interactions/dangers w/ other medications, abuse potential and the possible negative health implications/risks associated w/ overuse/abuse/misuse of controlled stimulant or sedating medications including overdose and death. Patient expressed understanding and agreement with current plan of care.  Controlled Substance Agreement reviewed, signed, copy given, copy filed to scan.

## 2017-11-29 NOTE — PROGRESS NOTES
Chief Complaint   Patient presents with    Hypertension     fasting follow up    Blood sugar problem    Medication Evaluation     controlled med check     1. Have you been to the ER, urgent care clinic since your last visit? Hospitalized since your last visit? No    2. Have you seen or consulted any other health care providers outside of the [address] [place] [address] since your last visit? Include any pap smears or colon screening.  No

## 2017-11-30 LAB
ALBUMIN SERPL-MCNC: 4.4 G/DL (ref 3.5–5.5)
ALBUMIN/GLOB SERPL: 1.9 {RATIO} (ref 1.2–2.2)
ALP SERPL-CCNC: 59 IU/L (ref 39–117)
ALT SERPL-CCNC: 30 IU/L (ref 0–32)
AST SERPL-CCNC: 20 IU/L (ref 0–40)
BILIRUB SERPL-MCNC: 0.3 MG/DL (ref 0–1.2)
BUN SERPL-MCNC: 12 MG/DL (ref 6–24)
BUN/CREAT SERPL: 16 (ref 9–23)
CALCIUM SERPL-MCNC: 9.2 MG/DL (ref 8.7–10.2)
CHLORIDE SERPL-SCNC: 101 MMOL/L (ref 96–106)
CHOLEST SERPL-MCNC: 229 MG/DL (ref 100–199)
CO2 SERPL-SCNC: 23 MMOL/L (ref 18–29)
CREAT SERPL-MCNC: 0.74 MG/DL (ref 0.57–1)
EST. AVERAGE GLUCOSE BLD GHB EST-MCNC: 114 MG/DL
GFR SERPLBLD CREATININE-BSD FMLA CKD-EPI: 112 ML/MIN/1.73
GFR SERPLBLD CREATININE-BSD FMLA CKD-EPI: 97 ML/MIN/1.73
GLOBULIN SER CALC-MCNC: 2.3 G/DL (ref 1.5–4.5)
GLUCOSE SERPL-MCNC: 96 MG/DL (ref 65–99)
HBA1C MFR BLD: 5.6 % (ref 4.8–5.6)
HDLC SERPL-MCNC: 51 MG/DL
INTERPRETATION, 910389: NORMAL
LDLC SERPL CALC-MCNC: 148 MG/DL (ref 0–99)
POTASSIUM SERPL-SCNC: 5.1 MMOL/L (ref 3.5–5.2)
PROT SERPL-MCNC: 6.7 G/DL (ref 6–8.5)
SODIUM SERPL-SCNC: 139 MMOL/L (ref 134–144)
TRIGL SERPL-MCNC: 152 MG/DL (ref 0–149)
VLDLC SERPL CALC-MCNC: 30 MG/DL (ref 5–40)

## 2017-12-15 DIAGNOSIS — F41.0 ANXIETY ATTACK: ICD-10-CM

## 2017-12-15 RX ORDER — ALPRAZOLAM 0.5 MG/1
TABLET ORAL
Qty: 20 TAB | Refills: 0 | OUTPATIENT
Start: 2017-12-15

## 2017-12-23 ENCOUNTER — TELEPHONE (OUTPATIENT)
Dept: FAMILY MEDICINE CLINIC | Age: 47
End: 2017-12-23

## 2017-12-24 NOTE — TELEPHONE ENCOUNTER
Liver, kidney, electrolytes normal  Fasting BS is good and wnl, and HgBA1c is very good and improved from last check, down to normal range. Great! Lipids are pretty much the same, TG in the 150's, goal <150 and HDL staying in the 140's; acceptable for her risk is <130 but ideal is <100. HDL good at 51, goal >50. Follow low cholesterol, high fiber diet. Increase intake of lean protein, green vegetables, whole grains, and avoid sweets, saturated fats, fried foods or fasting foods. Get at least 150 minutes of moderate intensity exercise per week. Fasting follow up in 3months for BP and medication check as well.  I started her on a new med for BP mgt last visit

## 2017-12-26 NOTE — TELEPHONE ENCOUNTER
Spoke with patient regarding results and recommendations. Voiced understanding.    Fasting follow up scheduled for April 5, 2018

## 2018-01-10 ENCOUNTER — TELEPHONE (OUTPATIENT)
Dept: FAMILY MEDICINE CLINIC | Age: 48
End: 2018-01-10

## 2018-01-11 NOTE — TELEPHONE ENCOUNTER
----- Message from Sachi Talbert LPN sent at 0/42/5726  4:00 PM EST -----  Regarding: FW: Non-Urgent Medical Question  Contact: [phone]  I can take care of 1 and 2. Let me know about #3.  ----- Message -----     From: Belvia Oppenheim     Sent: 1/10/2018   2:39 PM       To: [facility] Nurse Pool  Subject: Non-Urgent Medical Question                      Edie Wood,  I hope you and your family are well. .. Porsha Sarkar Snow days were AWESOME.    3 questions:  1. What dermatologist do you recommend to remove this mole/lump-like thing from my face? 2.  I believe I have a hearing loss (perhaps listening to kids play instruments and \"sing\"). Whom do you recommend? 3. After eating, I become super cold. ...like wrap up in an electric blanket on high cold. .... Porsha Sarkar fingers and toes go into \"Reynaud's purple mode. \"  I am trying to track which foods that this phenomenon happens the most.  The reaction is not consistent with gluten. Thank you for taking care of me.     Sincerely,  Priscilla Daugherty.

## 2018-02-09 ENCOUNTER — TELEPHONE (OUTPATIENT)
Dept: FAMILY MEDICINE CLINIC | Age: 48
End: 2018-02-09

## 2018-02-09 DIAGNOSIS — Z12.39 BREAST CANCER SCREENING: Primary | ICD-10-CM

## 2018-02-09 NOTE — TELEPHONE ENCOUNTER
----- Message from Jaylon Rene sent at 2/7/2018 11:43 AM EST -----  Regarding: Non-Urgent Medical Question  Contact: [phone]  Dr. Gerda Burkitt,  Since my physical appointment was changed to May 24, I would really like to have my mammogram that day also. Please advise or refer.   Thank you,  Joseph Veronica

## 2018-02-10 NOTE — TELEPHONE ENCOUNTER
I placed the order but as for the scheduling part, it would be best to give her the scheduling number and let her set the date/time she prefers. The scheduling dept is not going to know her preference, all we can do is place the order.  Please let her know and give her the number

## 2018-04-24 DIAGNOSIS — Z30.41 ENCOUNTER FOR BIRTH CONTROL PILLS MAINTENANCE: ICD-10-CM

## 2018-04-24 RX ORDER — LEVONORGESTREL AND ETHINYL ESTRADIOL 0.15-0.03
KIT ORAL
Qty: 91 TAB | Refills: 0 | Status: SHIPPED | OUTPATIENT
Start: 2018-04-24 | End: 2018-05-24 | Stop reason: SDUPTHER

## 2018-04-25 DIAGNOSIS — I10 BENIGN ESSENTIAL HYPERTENSION: ICD-10-CM

## 2018-04-25 RX ORDER — SPIRONOLACTONE 50 MG/1
TABLET, FILM COATED ORAL
Qty: 90 TAB | Refills: 0 | Status: SHIPPED | OUTPATIENT
Start: 2018-04-25 | End: 2018-05-24 | Stop reason: SDUPTHER

## 2018-04-25 NOTE — TELEPHONE ENCOUNTER
rx had been approved for 6 month in Feb.  Per verrbal order from Dr Sylvain Whitfield changed to 90 day supply.

## 2018-04-25 NOTE — TELEPHONE ENCOUNTER
Pharmacy on file verified  Requested Prescriptions     Pending Prescriptions Disp Refills    spironolactone (ALDACTONE) 50 mg tablet 30 Tab 5     Pharmacy is requesting a 90 day supply

## 2018-05-24 ENCOUNTER — OFFICE VISIT (OUTPATIENT)
Dept: FAMILY MEDICINE CLINIC | Age: 48
End: 2018-05-24

## 2018-05-24 VITALS
HEART RATE: 94 BPM | WEIGHT: 208.8 LBS | TEMPERATURE: 99 F | OXYGEN SATURATION: 98 % | SYSTOLIC BLOOD PRESSURE: 144 MMHG | HEIGHT: 67 IN | BODY MASS INDEX: 32.77 KG/M2 | RESPIRATION RATE: 18 BRPM | DIASTOLIC BLOOD PRESSURE: 98 MMHG

## 2018-05-24 DIAGNOSIS — Z51.81 ENCOUNTER FOR MEDICATION MONITORING: ICD-10-CM

## 2018-05-24 DIAGNOSIS — I10 BENIGN ESSENTIAL HYPERTENSION: ICD-10-CM

## 2018-05-24 DIAGNOSIS — F51.05 INSOMNIA SECONDARY TO DEPRESSION WITH ANXIETY: ICD-10-CM

## 2018-05-24 DIAGNOSIS — D75.839 THROMBOCYTOSIS: ICD-10-CM

## 2018-05-24 DIAGNOSIS — E78.2 HYPERCHOLESTEROLEMIA WITH HYPERTRIGLYCERIDEMIA: ICD-10-CM

## 2018-05-24 DIAGNOSIS — Z30.41 ENCOUNTER FOR BIRTH CONTROL PILLS MAINTENANCE: ICD-10-CM

## 2018-05-24 DIAGNOSIS — Z00.00 ROUTINE GENERAL MEDICAL EXAMINATION AT A HEALTH CARE FACILITY: Primary | ICD-10-CM

## 2018-05-24 DIAGNOSIS — R73.03 PREDIABETES: ICD-10-CM

## 2018-05-24 DIAGNOSIS — K76.0 NAFL (NONALCOHOLIC FATTY LIVER): ICD-10-CM

## 2018-05-24 DIAGNOSIS — F41.0 ANXIETY ATTACK: ICD-10-CM

## 2018-05-24 DIAGNOSIS — F41.8 INSOMNIA SECONDARY TO DEPRESSION WITH ANXIETY: ICD-10-CM

## 2018-05-24 DIAGNOSIS — K76.89 FOCAL NODULAR HYPERPLASIA OF LIVER: ICD-10-CM

## 2018-05-24 PROBLEM — Z79.899 CONTROLLED SUBSTANCE AGREEMENT SIGNED: Status: ACTIVE | Noted: 2018-05-24

## 2018-05-24 RX ORDER — SPIRONOLACTONE 50 MG/1
50 TABLET, FILM COATED ORAL 2 TIMES DAILY
Qty: 180 TAB | Refills: 1 | Status: SHIPPED | OUTPATIENT
Start: 2018-05-24 | End: 2019-03-22 | Stop reason: SDUPTHER

## 2018-05-24 RX ORDER — ALPRAZOLAM 0.5 MG/1
0.25 TABLET ORAL
Qty: 30 TAB | Refills: 3 | Status: SHIPPED | OUTPATIENT
Start: 2018-05-24 | End: 2018-12-14 | Stop reason: SDUPTHER

## 2018-05-24 RX ORDER — LEVONORGESTREL AND ETHINYL ESTRADIOL 0.15-0.03
KIT ORAL
Qty: 91 TAB | Refills: 3 | Status: SHIPPED | OUTPATIENT
Start: 2018-05-24 | End: 2019-06-13 | Stop reason: SDUPTHER

## 2018-05-24 RX ORDER — SPIRONOLACTONE 50 MG/1
TABLET, FILM COATED ORAL
Qty: 180 TAB | Refills: 1 | Status: SHIPPED | OUTPATIENT
Start: 2018-05-24 | End: 2018-05-24 | Stop reason: SDUPTHER

## 2018-05-24 NOTE — ASSESSMENT & PLAN NOTE
Stable, based on history, physical exam and review of pertinent labs, studies and medications; meds reconciled; continue current treatment plan., This condition has been evaluated and followed by specialist. Evaluation Hem-Onc Dr Thom Oneal 2015: Based on the bone marrow biopsy and the lab results there is no clear reason for her thrombocytosis. There is no sign of a primary hematologic disorder. This is likely secondary thrombocytosis versus normal for her. All of the results that we have for her have been high-normal.  This makes malignancy less likely. She has seen rheumatology and there is no sign of autoimmune disorder. Imaging negative for malignancy.   Lab Results   Component Value Date/Time    WBC 10.0 04/10/2017 12:34 PM    HGB 12.8 04/10/2017 12:34 PM    HCT 38.8 04/10/2017 12:34 PM    PLATELET 240 61/99/7692 12:34 PM    Creatinine 0.74 11/29/2017 09:16 AM    BUN 12 11/29/2017 09:16 AM    Potassium 5.1 11/29/2017 09:16 AM

## 2018-05-24 NOTE — PROGRESS NOTES
Chief Complaint   Patient presents with    Complete Physical     fasting      1. Have you been to the ER, urgent care clinic since your last visit? Hospitalized since your last visit? No    2. Have you seen or consulted any other health care providers outside of the [address] [place] [address] since your last visit? Include any pap smears or colon screening.  No

## 2018-05-24 NOTE — ASSESSMENT & PLAN NOTE
Stable, based on history, physical exam and review of pertinent labs, studies and medications; meds reconciled; continue current treatment plan., This condition is managed by Specialist, Hepatology, Dr Fanta Wheatley.

## 2018-05-24 NOTE — ASSESSMENT & PLAN NOTE
Uncontrolled, based on history, physical exam and review of pertinent labs, studies and medications; meds reconciled; changes to treatment plan as per orders, lifestyle modifications recommended. Key CAD CHF Meds             spironolactone (ALDACTONE) 50 mg tablet  (Taking) Take 1 Tab by mouth two (2) times a day. SLO-NIACIN PO  (Taking) Take 500 mg by mouth daily. DOCOSAHEXANOIC ACID/EPA (FISH OIL PO)  (Taking) Take 1,000 mg by mouth daily. aspirin delayed-release 81 mg tablet  (Taking) Take  by mouth daily.

## 2018-05-24 NOTE — PATIENT INSTRUCTIONS
Anxiety Disorder: Care Instructions  Your Care Instructions    Anxiety is a normal reaction to stress. Difficult situations can cause you to have symptoms such as sweaty palms and a nervous feeling. In an anxiety disorder, the symptoms are far more severe. Constant worry, muscle tension, trouble sleeping, nausea and diarrhea, and other symptoms can make normal daily activities difficult or impossible. These symptoms may occur for no reason, and they can affect your work, school, or social life. Medicines, counseling, and self-care can all help. Follow-up care is a key part of your treatment and safety. Be sure to make and go to all appointments, and call your doctor if you are having problems. It's also a good idea to know your test results and keep a list of the medicines you take. How can you care for yourself at home? · Take medicines exactly as directed. Call your doctor if you think you are having a problem with your medicine. · Go to your counseling sessions and follow-up appointments. · Recognize and accept your anxiety. Then, when you are in a situation that makes you anxious, say to yourself, \"This is not an emergency. I feel uncomfortable, but I am not in danger. I can keep going even if I feel anxious. \"  · Be kind to your body:  ¨ Relieve tension with exercise or a massage. ¨ Get enough rest.  ¨ Avoid alcohol, caffeine, nicotine, and illegal drugs. They can increase your anxiety level and cause sleep problems. ¨ Learn and do relaxation techniques. See below for more about these techniques. · Engage your mind. Get out and do something you enjoy. Go to a funny movie, or take a walk or hike. Plan your day. Having too much or too little to do can make you anxious. · Keep a record of your symptoms. Discuss your fears with a good friend or family member, or join a support group for people with similar problems. Talking to others sometimes relieves stress.   · Get involved in social groups, or volunteer to help others. Being alone sometimes makes things seem worse than they are. · Get at least 30 minutes of exercise on most days of the week to relieve stress. Walking is a good choice. You also may want to do other activities, such as running, swimming, cycling, or playing tennis or team sports. Relaxation techniques  Do relaxation exercises 10 to 20 minutes a day. You can play soothing, relaxing music while you do them, if you wish. · Tell others in your house that you are going to do your relaxation exercises. Ask them not to disturb you. · Find a comfortable place, away from all distractions and noise. · Lie down on your back, or sit with your back straight. · Focus on your breathing. Make it slow and steady. · Breathe in through your nose. Breathe out through either your nose or mouth. · Breathe deeply, filling up the area between your navel and your rib cage. Breathe so that your belly goes up and down. · Do not hold your breath. · Breathe like this for 5 to 10 minutes. Notice the feeling of calmness throughout your whole body. As you continue to breathe slowly and deeply, relax by doing the following for another 5 to 10 minutes:  · Tighten and relax each muscle group in your body. You can begin at your toes and work your way up to your head. · Imagine your muscle groups relaxing and becoming heavy. · Empty your mind of all thoughts. · Let yourself relax more and more deeply. · Become aware of the state of calmness that surrounds you. · When your relaxation time is over, you can bring yourself back to alertness by moving your fingers and toes and then your hands and feet and then stretching and moving your entire body. Sometimes people fall asleep during relaxation, but they usually wake up shortly afterward. · Always give yourself time to return to full alertness before you drive a car or do anything that might cause an accident if you are not fully alert.  Never play a relaxation tape while you drive a car. When should you call for help? Call 911 anytime you think you may need emergency care. For example, call if:  ? · You feel you cannot stop from hurting yourself or someone else. ? Keep the numbers for these national suicide hotlines: 8-877-716-TALK (1-849.193.4940) and 8-221-XNEUPOG (0-763.256.5539). If you or someone you know talks about suicide or feeling hopeless, get help right away. ? Watch closely for changes in your health, and be sure to contact your doctor if:  ? · You have anxiety or fear that affects your life. ? · You have symptoms of anxiety that are new or different from those you had before. Where can you learn more? Go to http://margaret-dano.info/. Enter P754 in the search box to learn more about \"Anxiety Disorder: Care Instructions. \"  Current as of: May 12, 2017  Content Version: 11.4  © 1912-3243 [employer]. Care instructions adapted under license by [employer] (which disclaims liability or warranty for this information). If you have questions about a medical condition or this instruction, always ask your healthcare professional. [place] [address] any warranty or liability for your use of this information. Well Visit, Ages 25 to 48: Care Instructions  Your Care Instructions    Physical exams can help you stay healthy. Your doctor has checked your overall health and may have suggested ways to take good care of yourself. He or she also may have recommended tests. At home, you can help prevent illness with healthy eating, regular exercise, and other steps. Follow-up care is a key part of your treatment and safety. Be sure to make and go to all appointments, and call your doctor if you are having problems. It's also a good idea to know your test results and keep a list of the medicines you take. How can you care for yourself at home? · Reach and stay at a healthy weight.  This will lower your risk for many problems, such as obesity, diabetes, heart disease, and high blood pressure. · Get at least 30 minutes of physical activity on most days of the week. Walking is a good choice. You also may want to do other activities, such as running, swimming, cycling, or playing tennis or team sports. Discuss any changes in your exercise program with your doctor. · Do not smoke or allow others to smoke around you. If you need help quitting, talk to your doctor about stop-smoking programs and medicines. These can increase your chances of quitting for good. · Talk to your doctor about whether you have any risk factors for sexually transmitted infections (STIs). Having one sex partner (who does not have STIs and does not have sex with anyone else) is a good way to avoid these infections. · Use birth control if you do not want to have children at this time. Talk with your doctor about the choices available and what might be best for you. · Protect your skin from too much sun. When you're outdoors from 10 a.m. to 4 p.m., stay in the shade or cover up with clothing and a hat with a wide brim. Wear sunglasses that block UV rays. Even when it's cloudy, put broad-spectrum sunscreen (SPF 30 or higher) on any exposed skin. · See a dentist one or two times a year for checkups and to have your teeth cleaned. · Wear a seat belt in the car. · Drink alcohol in moderation, if at all. That means no more than 2 drinks a day for men and 1 drink a day for women. Follow your doctor's advice about when to have certain tests. These tests can spot problems early. For everyone  · Cholesterol. Have the fat (cholesterol) in your blood tested after age 21. Your doctor will tell you how often to have this done based on your age, family history, or other things that can increase your risk for heart disease. · Blood pressure. Have your blood pressure checked during a routine doctor visit.  Your doctor will tell you how often to check your blood pressure based on your age, your blood pressure results, and other factors. · Vision. Talk with your doctor about how often to have a glaucoma test.  · Diabetes. Ask your doctor whether you should have tests for diabetes. · Colon cancer. Have a test for colon cancer at age 48. You may have one of several tests. If you are younger than 48, you may need a test earlier if you have any risk factors. Risk factors include whether you already had a precancerous polyp removed from your colon or whether your parent, brother, sister, or child has had colon cancer. For women  · Breast exam and mammogram. Talk to your doctor about when you should have a clinical breast exam and a mammogram. Medical experts differ on whether and how often women under 50 should have these tests. Your doctor can help you decide what is right for you. · Pap test and pelvic exam. Begin Pap tests at age 24. A Pap test is the best way to find cervical cancer. The test often is part of a pelvic exam. Ask how often to have this test.  · Tests for sexually transmitted infections (STIs). Ask whether you should have tests for STIs. You may be at risk if you have sex with more than one person, especially if your partners do not wear condoms. For men  · Tests for sexually transmitted infections (STIs). Ask whether you should have tests for STIs. You may be at risk if you have sex with more than one person, especially if you do not wear a condom. · Testicular cancer exam. Ask your doctor whether you should check your testicles regularly. · Prostate exam. Talk to your doctor about whether you should have a blood test (called a PSA test) for prostate cancer. Experts differ on whether and when men should have this test. Some experts suggest it if you are older than 39 and are -American or have a father or brother who got prostate cancer when he was younger than 72. When should you call for help?   Watch closely for changes in your health, and be sure to contact your doctor if you have any problems or symptoms that concern you. Where can you learn more? Go to http://margaret-dano.info/. Enter P072 in the search box to learn more about \"Well Visit, Ages 25 to 48: Care Instructions. \"  Current as of: May 12, 2017  Content Version: 11.4  © 7931-2626 [employer]. Care instructions adapted under license by [employer] (which disclaims liability or warranty for this information). If you have questions about a medical condition or this instruction, always ask your healthcare professional. [place] [address] any warranty or liability for your use of this information.

## 2018-05-24 NOTE — PROGRESS NOTES
HISTORY OF PRESENT ILLNESS   HPI  Annual CPE, fasting. No well woman exam today, deferred. Doing well on current BCP, needs refill. Still under a great deal of stress at work. She is in the process of looking for a new job and has been going to various interviews for this. Her BP's tend to run high during and right after work. She checks it ~ 3-4 x a week and it tends to run 126-128/90's. Her last checks here have all been in the 140's SBP's. She would like to increase her BP medication while she is undergoing this stressful time. She finds herself to be stress eating more, her wt is up. Continues to walk every day but wt still up bc of her poor eating habits lately. She plans to get back on track hopefully once she gets a new job. She otherwise feels that the Zoloft is keeping her mood stable, but she still relies on taking 1/2 tablet of Xanax qhs to help her fall asleep, stay asleep soundly. Keeps her mind from racing all night, decreases night time panic attacks and enables her to rest so that she can awaken feeling refreshed and functional for the following day. REVIEW OF SYMPTOMS     Review of Systems   Constitutional: Negative. HENT: Negative. Eyes: Negative. Wears eyeglasses, up to date on routine eye exam recently   Respiratory: Negative. Cardiovascular: Negative. Gastrointestinal: Negative. Genitourinary: Negative. No complaints of abnormal vaginal discharge or bleeding, post-coital bleeding, dyspareunia, dryness, irritation or urinary complaints. No complaints of breast lumps, tenderness, mass or nipple discharge. Skin: Negative. Neurological: Negative.     Endo/Heme/Allergies: Negative.            PROBLEM LIST/MEDICAL HISTORY      Problem List  Date Reviewed: 5/24/2018          Codes Class Noted    Benign essential hypertension ICD-10-CM: I10  ICD-9-CM: 401.1  2/24/2015    Overview Signed 2/24/2015 10:21 AM by Kassandra Saavedra MD [id number]             Prediabetes ICD-10-CM: R73.03  ICD-9-CM: 790.29  4/23/2017    Overview Signed 4/23/2017  9:16 PM by Timothy Vitale MD     Mild 4-2017             Muscle contraction headaches & Neck Muscle Spasms ICD-10-CM: G44.209  ICD-9-CM: 307.81  4/10/2017        Thrombocytosis ([address] ICD-10-CM: D47.3  ICD-9-CM: 238.71  3/22/2016    Overview Signed 3/22/2016 10:08 AM by MD Dr Dirk Faust 2015             Lymphocytosis ICD-10-CM: Z65.832  ICD-9-CM: 288.61  3/22/2016    Overview Signed 3/22/2016 10:08 AM by MD Dr Dirk Faust, 2015             Microhematuria ICD-10-CM: R31.29  ICD-9-CM: 599.72  12/15/2015    Overview Signed 12/15/2015  3:47 PM by Timothy Vitale MD     [id number] [facility] Dr Ishmael Sepulveda.  KUB neg excpet small stone vs phlebolith             NAFL (nonalcoholic fatty liver) [id number]: K76.0  ICD-9-CM: 571.8  8/27/2015    Overview Signed 8/27/2015  8:45 AM by MD Dr Concha Faust             Focal nodular hyperplasia of liver ICD-10-CM: K76.89  ICD-9-CM: 573.8  5/10/2015    Overview Signed 8/27/2015  8:45 AM by Timothy Vitale MD     GI Dr Concha Allred and Onc Dr Dirk Yusuf             Vitamin D deficiency ICD-10-CM: E55.9  ICD-9-CM: 268.9  12/13/2011    Overview Signed 12/13/2011  1:16 PM by Timothy Vitale MD     10/2011             Hypercholesterolemia with high triglycerides ICD-10-CM: E78.2  ICD-9-CM: 272.2  12/13/2011        Chronic Mechanical back pain ICD-10-CM: M54.9  ICD-9-CM: 724.5  10/25/2011        History of pyelonephritis, 1992 ICD-10-CM: Z87.448  ICD-9-CM: V13.02  10/25/2011        Allergic rhinitis ICD-10-CM: J30.9  ICD-9-CM: 477.9  10/25/2011    Overview Signed 10/25/2011  9:45 AM by Timothy Vitale MD     Worse in Spring             Cataract of right eye ICD-10-CM: H26.9  ICD-9-CM: 366.9  10/25/2011    Overview Signed 10/25/2011  9:46 AM by Timothy Vitale MD     Opth VEI, Dr Adan Serve S/P Dog bite, 2003 ICD-10-CM: W54. 0XXA  ICD-9-CM: E906.0  10/25/2011    Overview Signed 10/25/2011  9:46 AM by [address], MD     Td and Rabies [facility]             Rosacea ICD-10-CM: L71.9  ICD-9-CM: 695.3  10/25/2011        Fibrocystic breast changes ICD-10-CM: N60.19  ICD-9-CM: 610.1  10/25/2011        Raynaud's syndrome ICD-10-CM: I73.00  ICD-9-CM: 443.0  Unknown    Overview Signed 8/27/2015  8:48 AM by [address], MD     Autoimmune workup Dr Winkler Reach Spring 2015             PMS (premenstrual syndrome) ICD-10-CM: N94.3  ICD-9-CM: 625.4  6/21/2010        Depression ICD-10-CM: F32.9  ICD-9-CM: 040  6/21/2010        Asthma, mild intermittent ICD-10-CM: J45.909  ICD-9-CM: 493.90  6/21/2010    Overview Signed 10/25/2011  9:45 AM by [address], MD     Since ~ 22 yo             Primary dysmenorrhea ICD-10-CM: N94.4  ICD-9-CM: 625.3  6/21/2010        Anxiety attacks ICD-10-CM: F41.0  ICD-9-CM: 300.01  6/21/2010    Overview Signed 6/21/2010  8:28 PM by [address], MD     March 2010                       PAST SURGICAL HISTORY       Past Surgical History:   Procedure Laterality Date    EKG TREADMILL STRESS TEST  May 2010    Dr Jaelyn Renteria and Dr Lachelle hodge for atypical CP; dx Anxiety    EMG TWO EXTREMITIES UPPER  1999    normal    HX OTHER SURGICAL  3/2014 ordered by Dr Gretta Aguayo, benign    HX SHOULDER ARTHROSCOPY  1996    left shoulder    US PELV NON OBS  2004    normal    XR SPINE LUMB 2 OR 3 V  1993    normal    XR SPINE THORAC 3 V  1993    normal    XR UPPER GI SERIES W KUB  2000    normal         MEDICATIONS      Current Outpatient Prescriptions   Medication Sig    turmeric (CURCUMIN) by Does Not Apply route.  cranberry extract 450 mg tab tablet Take 450 mg by mouth.     levonorgestrel-ethinyl estradiol (SEASONALE) 0.15 mg-30 mcg 3MPk TAKE 1 TABLET BY MOUTH EVERY DAY    spironolactone (ALDACTONE) 50 mg tablet TAKE 1 TABLET BY MOUTH EVERY DAY    ALPRAZolam (XANAX) 0.5 mg tablet Take 0.5 Tabs by mouth nightly as needed for Anxiety or Sleep. Max Daily Amount: 0.25 mg.    sertraline (ZOLOFT) 50 mg tablet TAKE 1 AND 1/2 TABLETS BY MOUTH DAILY    VENTOLIN HFA 90 mcg/actuation inhaler INHALE 2 PUFFS BY MOUTH EVERY 6 HOURS AS NEEDED FOR WHEEZING    b complex vitamins tablet Take 1 Tab by mouth daily.  garlic cap Take  by mouth.  cyclobenzaprine (FLEXERIL) 10 mg tablet Take 10 mg by mouth as needed for Muscle Spasm(s). Takes a few x a month max    SLO-NIACIN PO Take 500 mg by mouth daily.  DOCOSAHEXANOIC ACID/EPA (FISH OIL PO) Take 1,000 mg by mouth daily.  multivitamin (ONE A DAY) tablet Take 1 Tab by mouth daily.  cholecalciferol (VITAMIN D3) 1,000 unit cap Take  by mouth daily.  loratadine (CLARITIN) 10 mg tablet Take 10 mg by mouth daily.  aspirin delayed-release 81 mg tablet Take  by mouth daily. No current facility-administered medications for this visit.            ALLERGIES     Allergies   Allergen Reactions    Crestor [Rosuvastatin] Myalgia     Insomnia and muscle pain    Flonase [Fluticasone] Other (comments)     Cataracts enlarged    Hydrochlorothiazide Other (comments)     Insomnia, headache, ringing in ears, anxious    Nortriptyline Other (comments)     Increased depression    Other Medication Other (comments)     Pt is avoiding Nasal CS d/t cataracts    Sulfa (Sulfonamide Antibiotics) Hives          SOCIAL HISTORY       Social History     Social History    Marital status: SINGLE     Spouse name: N/A    Number of children: 0    Years of education: N/A     Occupational History         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    Smoking status: Former Smoker     Types: Cigarettes     Quit date: 10/25/1991    Smokeless tobacco: Never Used      Comment: smoked lightly for about 5 months    Alcohol use 0.0 oz/week     0 Standard drinks or equivalent per week      Comment: very seldom    Drug use: No    Sexual activity: No     Other Topics Concern    Caffeine Concern Yes     3 12 oz cans of diet Mountain Dews a day    Sleep Concern Yes     was going for sleep eval per Dr See Fuelling  to r/o JUAN MANUEL, but she never went    Stress Concern Yes     principal at work is stressful, in the process of changing jobs, interviewing    Special Diet No     stress eating    Exercise Yes     walks dog 2-4 x a day x 3-6k steps each time x 30-60 minutes each     Social History Narrative        IMMUNIZATIONS  Immunization History   Administered Date(s) Administered    Influenza Vaccine 2014, 10/29/2017, 10/29/2017    Meningococcal Vaccine 2002    PPD 1998, 1999    Rabies Vaccine 2003    TD Vaccine 1998, 2003    Tdap 2014         FAMILY HISTORY     Family History   Problem Relation Age of Onset    High Cholesterol Mother      high TG's in the 80's    Hypertension Mother     Thyroid Disease Mother     Other Mother      benign ovarian cysts    Anxiety Mother     Depression Mother     Diabetes Father      type 2    Cancer Father      Hodgkins    Arrhythmia Father 72     Ablation; chemo/radiation induced    Heart Surgery Father 72     stents placed    Anxiety Maternal Grandmother     Heart Disease Maternal Grandmother       80; h/o A.  Fib    Stroke Maternal Grandmother     Heart Attack Maternal Grandfather       age 74    Other Paternal Grandmother       GI bleed in her early 63's    Cancer Paternal Grandfather 61     pancreatic and liver cancer,  age 61         VITALS     Visit Vitals    BP (!) 144/98 (BP 1 Location: Left arm, BP Patient Position: Sitting)    Pulse 94    Temp 99 °F (37.2 °C) (Oral)    Resp 18    Ht 5' 6.5\" (1.689 m)    Wt 208 lb 12.8 oz (94.7 kg)    SpO2 98%    BMI 33.2 kg/m2          PHYSICAL EXAMINATION     Physical Exam   Constitutional: She is oriented to person, place, and time. No distress. HENT:   Nose: Nose normal.   Mouth/Throat: Oropharynx is clear and moist. No oropharyngeal exudate. Eyes: Conjunctivae and EOM are normal. Pupils are equal, round, and reactive to light. Neck: Neck supple. No JVD present. Carotid bruit is not present. No thyromegaly present. Cardiovascular: Normal rate, regular rhythm and normal heart sounds. No murmur heard. Pulmonary/Chest: Effort normal and breath sounds normal.   Abdominal: Soft. Bowel sounds are normal. She exhibits no distension and no mass. There is no tenderness. Musculoskeletal: Normal range of motion. She exhibits no edema or tenderness. Lymphadenopathy:     She has no cervical adenopathy. Neurological: She is alert and oriented to person, place, and time. Gait normal. Coordination normal.   Skin: Skin is warm and dry. Psychiatric: Mood and affect normal.   Vitals reviewed.              ASSESSMENT & PLAN   Diagnoses and all orders for this visit:    1. Routine general medical examination at a health care facility  -     CBC W/O DIFF  -     LIPID PANEL  -     METABOLIC PANEL, COMPREHENSIVE  -     TSH 3RD GENERATION  -     URINALYSIS W/ RFLX MICROSCOPIC  -     HEMOGLOBIN A1C WITH EAG    2. Encounter for birth control pills maintenance  -     levonorgestrel-ethinyl estradiol (SEASONALE) 0.15 mg-30 mcg 3MPk; TAKE 1 TABLET BY MOUTH EVERY DAY    3. Benign essential hypertension  Assessment & Plan:  Uncontrolled, based on history, physical exam and review of pertinent labs, studies and medications; meds reconciled; changes to treatment plan as per orders, lifestyle modifications recommended. Key CAD CHF Meds             spironolactone (ALDACTONE) 50 mg tablet  (Taking) Take 1 Tab by mouth two (2) times a day. SLO-NIACIN PO  (Taking) Take 500 mg by mouth daily. DOCOSAHEXANOIC ACID/EPA (FISH OIL PO)  (Taking) Take 1,000 mg by mouth daily. aspirin delayed-release 81 mg tablet  (Taking) Take  by mouth daily. Orders:  Increase Aldactone to bid  -     spironolactone (ALDACTONE) 50 mg tablet; Take 1 Tab by mouth two (2) times a day. 4. Prediabetes  -     METABOLIC PANEL, COMPREHENSIVE  -     HEMOGLOBIN A1C WITH EAG    5. Hypercholesterolemia with high triglycerides  Assessment & Plan:  Stable, based on history, physical exam and review of pertinent labs, studies and medications; meds reconciled; lifestyle modifications recommended. Key Antihyperlipidemia Meds             SLO-NIACIN PO  (Taking) Take 500 mg by mouth daily. DOCOSAHEXANOIC ACID/EPA (FISH OIL PO)  (Taking) Take 1,000 mg by mouth daily. Lab Results  Component Value Date/Time   Cholesterol, total 229 (H) 11/29/2017 09:16 AM   HDL Cholesterol 51 11/29/2017 09:16 AM   LDL, calculated 148 (H) 11/29/2017 09:16 AM   Triglyceride 152 (H) 11/29/2017 09:16 AM       Orders:  -     LIPID PANEL    6. Thrombocytosis (HCC)  Assessment & Plan:  Stable, based on history, physical exam and review of pertinent labs, studies and medications; meds reconciled; continue current treatment plan., This condition has been evaluated and followed by specialist. Evaluation Hem-Onc Dr Lakisha Chaparro 2015: Based on the bone marrow biopsy and the lab results there is no clear reason for her thrombocytosis. There is no sign of a primary hematologic disorder. This is likely secondary thrombocytosis versus normal for her. All of the results that we have for her have been high-normal.  This makes malignancy less likely. She has seen rheumatology and there is no sign of autoimmune disorder. Imaging negative for malignancy. Lab Results   Component Value Date/Time    WBC 10.0 04/10/2017 12:34 PM    HGB 12.8 04/10/2017 12:34 PM    HCT 38.8 04/10/2017 12:34 PM    PLATELET 751 64/33/5003 12:34 PM    Creatinine 0.74 11/29/2017 09:16 AM    BUN 12 11/29/2017 09:16 AM    Potassium 5.1 11/29/2017 09:16 AM       Orders:  -     CBC W/O DIFF    7.  NAFL (nonalcoholic fatty liver)  Assessment & Plan:  Stable. Followed by Hepatology, Dr Dutch Duverney, COMPREHENSIVE    8. Focal nodular hyperplasia of liver  Assessment & Plan:  Stable, based on history, physical exam and review of pertinent labs, studies and medications; meds reconciled; continue current treatment plan., This condition is managed by Specialist, Hepatology, Dr Mauro Hyatt. Orders:  -     METABOLIC PANEL, COMPREHENSIVE    9. Anxiety attacks  -     ALPRAZolam (XANAX) 0.5 mg tablet; Take 0.5 Tabs by mouth nightly as needed for Anxiety or Sleep. Max Daily Amount: 0.25 mg. 10. Insomnia secondary to depression with anxiety: Continue Zoloft daily and Xanax 1/2 tablet qhs for now  -     ALPRAZolam (XANAX) 0.5 mg tablet; Take 0.5 Tabs by mouth nightly as needed for Anxiety or Sleep. Max Daily Amount: 0.25 mg. 11. Encounter for medication monitoring:  pulled and reviewed. No problems noted. Low abuse/misuse potential. Patient counseled and we discussed controlled medications, effects, side effects, indications, risks vs benefits, precautions, potential interactions/dangers w/ other medications, abuse potential and the possible negative health implications/risks associated w/ overuse/abuse/misuse of controlled stimulant or sedating medications including overdose and death. Patient expressed understanding and agreement with current plan of care. (Additional discussion to women that these medications are not to be used during pregnancy due to potential fetal/maternal harm and risk of pregnancy complications. For women of childbearing age, the importance of adhering to appropriate contraceptive measures was discussed to prevent these potential negative health implications, risks to fetus, and pregnancy complications.) CSA updated . Fasting labs today  Reviewed diet, nutrition, exercise, weight management, age/risk based screening recommendations, health maintenance & prevention counseling.    Her mammogram is scheduled for 6-21-18. Well woman/gyn exam deferred today. Pap negative last year. Cardiovascular risk and specific lipid/LDL/BS/BP goals reviewed  Reviewed medications and side effects in detail  Further follow up & other recommendations pending review of labs.  If all remains good and stable, follow up in 6months, sooner prn

## 2018-05-25 LAB
ALBUMIN SERPL-MCNC: 4.6 G/DL (ref 3.5–5.5)
ALBUMIN/GLOB SERPL: 1.8 {RATIO} (ref 1.2–2.2)
ALP SERPL-CCNC: 65 IU/L (ref 39–117)
ALT SERPL-CCNC: 29 IU/L (ref 0–32)
APPEARANCE UR: CLEAR
AST SERPL-CCNC: 25 IU/L (ref 0–40)
BACTERIA #/AREA URNS HPF: NORMAL /[HPF]
BILIRUB SERPL-MCNC: 0.4 MG/DL (ref 0–1.2)
BILIRUB UR QL STRIP: NEGATIVE
BUN SERPL-MCNC: 12 MG/DL (ref 6–24)
BUN/CREAT SERPL: 15 (ref 9–23)
CALCIUM SERPL-MCNC: 9.9 MG/DL (ref 8.7–10.2)
CASTS URNS QL MICRO: NORMAL /LPF
CHLORIDE SERPL-SCNC: 101 MMOL/L (ref 96–106)
CHOLEST SERPL-MCNC: 238 MG/DL (ref 100–199)
CO2 SERPL-SCNC: 22 MMOL/L (ref 18–29)
COLOR UR: YELLOW
CREAT SERPL-MCNC: 0.81 MG/DL (ref 0.57–1)
EPI CELLS #/AREA URNS HPF: NORMAL /HPF
ERYTHROCYTE [DISTWIDTH] IN BLOOD BY AUTOMATED COUNT: 13.5 % (ref 12.3–15.4)
EST. AVERAGE GLUCOSE BLD GHB EST-MCNC: 105 MG/DL
GFR SERPLBLD CREATININE-BSD FMLA CKD-EPI: 100 ML/MIN/1.73
GFR SERPLBLD CREATININE-BSD FMLA CKD-EPI: 87 ML/MIN/1.73
GLOBULIN SER CALC-MCNC: 2.5 G/DL (ref 1.5–4.5)
GLUCOSE SERPL-MCNC: 98 MG/DL (ref 65–99)
GLUCOSE UR QL: NEGATIVE
HBA1C MFR BLD: 5.3 % (ref 4.8–5.6)
HCT VFR BLD AUTO: 42 % (ref 34–46.6)
HDLC SERPL-MCNC: 58 MG/DL
HGB BLD-MCNC: 14.1 G/DL (ref 11.1–15.9)
HGB UR QL STRIP: ABNORMAL
INTERPRETATION, 910389: NORMAL
KETONES UR QL STRIP: NEGATIVE
LDLC SERPL CALC-MCNC: 141 MG/DL (ref 0–99)
LEUKOCYTE ESTERASE UR QL STRIP: NEGATIVE
MCH RBC QN AUTO: 29.4 PG (ref 26.6–33)
MCHC RBC AUTO-ENTMCNC: 33.6 G/DL (ref 31.5–35.7)
MCV RBC AUTO: 88 FL (ref 79–97)
MICRO URNS: ABNORMAL
NITRITE UR QL STRIP: NEGATIVE
PH UR STRIP: 7 [PH] (ref 5–7.5)
PLATELET # BLD AUTO: 536 X10E3/UL (ref 150–379)
POTASSIUM SERPL-SCNC: 4.6 MMOL/L (ref 3.5–5.2)
PROT SERPL-MCNC: 7.1 G/DL (ref 6–8.5)
PROT UR QL STRIP: NEGATIVE
RBC # BLD AUTO: 4.79 X10E6/UL (ref 3.77–5.28)
RBC #/AREA URNS HPF: NORMAL /HPF
SODIUM SERPL-SCNC: 138 MMOL/L (ref 134–144)
SP GR UR: 1.01 (ref 1–1.03)
TRIGL SERPL-MCNC: 196 MG/DL (ref 0–149)
TSH SERPL DL<=0.005 MIU/L-ACNC: 3.61 UIU/ML (ref 0.45–4.5)
UROBILINOGEN UR STRIP-MCNC: 0.2 MG/DL (ref 0.2–1)
VLDLC SERPL CALC-MCNC: 39 MG/DL (ref 5–40)
WBC # BLD AUTO: 10.7 X10E3/UL (ref 3.4–10.8)
WBC #/AREA URNS HPF: NORMAL /HPF

## 2018-06-19 ENCOUNTER — TELEPHONE (OUTPATIENT)
Dept: FAMILY MEDICINE CLINIC | Age: 48
End: 2018-06-19

## 2018-06-19 DIAGNOSIS — D75.839 THROMBOCYTOSIS: Primary | ICD-10-CM

## 2018-06-20 NOTE — TELEPHONE ENCOUNTER
PI of results. Will send the guidelines/rec via [facility]. Pt wants to know if she needs to still see hematology since Dr Michael Cheng left. She wanted me to check with Dr Jose Antonio Flower and I can let he know when I send her the other info. She also reports that she got a new job and she is very excited about. She is hoping that will help her to be healthier and not as stressed. She was transferred to set up 6 month fasting f/u.

## 2018-06-20 NOTE — TELEPHONE ENCOUNTER
I looked back at her last appt w/ Dr Nichole Navarro in 2016 and he said to follow up in 6months.    :/

## 2018-06-20 NOTE — TELEPHONE ENCOUNTER
Platelets elevated but still in her baseline range (h/o this followed by Hematology and shared in CC)  Rest of blood counts normal  Liver, kidney, thyroid normal  Tg high and worsened from 152 to 196  LDL about same in the 140's  HDL remains good at 58 which is above goal of 50  Fasting BS and HgBA1c both good and wnl, in fact HgBA1c has gotten better and is best reading it has been the last several checks, great job    According to the ASCVD (cardiovascular risk) Calculator (based on age, gender, ethnicity, blood pressure, cholesterol, smoking history, any diabetes history, heart meds): the patient's calculated 10 yr CV risk is 1.97 % (<5%) for MI, coronary heart disease, or stroke. Based on these scores according to AHA/ACC guidelines, statin medication is not warranted at this time. But do need to have her focus more on lifestyle modification. Follow tips below. Fasting follow up 6months    Adhere to the following Lifestyle Modifications below:  Diet:  ~Consume a dietary pattern that emphasizes intake of vegetables, fruits, whole grains, poultry, fish, low fat dairy, legumes, non tropical vegetable oils and nuts. ~Avoid red meat, saturated fats, fried foods, sweets, and sugars. Limit carbs to no more than one serving per meal and <40 g per serving. Limit snacks to <15 g carbs. Increase lean protein. ~Avoid added salt and if you have a history of high blood pressure also avoid added salt and limit sodium intake to < 2 grams per day. ~Reference the AHA (American Heart Association) Diet or DASH diet for additional guidelines. ~Limit alcohol to no more than 1 standard drink per day for women and 2 for men (ie/ 12 oz beer, 5 oz wine, 1.5 oz liquor). ~Avoid tobacco products. ~Limit caffeine to no more than 1-2 small servings per day. Exercise:  ~Get regular moderate intensity cardio/aerobic exercise at least 150 minutes per week ie/ 30-50 minutes 3-4 x a week.

## 2018-06-21 ENCOUNTER — HOSPITAL ENCOUNTER (OUTPATIENT)
Dept: MAMMOGRAPHY | Age: 48
Discharge: HOME | End: 2018-06-21
Attending: FAMILY MEDICINE
Payer: COMMERCIAL

## 2018-06-21 DIAGNOSIS — Z12.39 BREAST CANCER SCREENING: ICD-10-CM

## 2018-06-21 PROCEDURE — 77063 BREAST TOMOSYNTHESIS BI: CPT

## 2018-07-03 ENCOUNTER — OFFICE VISIT (OUTPATIENT)
Dept: ONCOLOGY | Age: 48
End: 2018-07-03

## 2018-07-03 VITALS
HEIGHT: 66 IN | HEART RATE: 84 BPM | BODY MASS INDEX: 33.59 KG/M2 | OXYGEN SATURATION: 96 % | DIASTOLIC BLOOD PRESSURE: 91 MMHG | TEMPERATURE: 98.7 F | WEIGHT: 209 LBS | RESPIRATION RATE: 16 BRPM | SYSTOLIC BLOOD PRESSURE: 146 MMHG

## 2018-07-03 DIAGNOSIS — D72.820 LYMPHOCYTOSIS: ICD-10-CM

## 2018-07-03 DIAGNOSIS — D75.839 THROMBOCYTOSIS: Primary | ICD-10-CM

## 2018-07-14 LAB
ABNORMAL WBC NFR SPEC FC: NORMAL %
ANALYSIS AND GATING STRATEGY: NORMAL
ANNOTATION COMMENT IMP: NORMAL
ASSESSMENT OF LEUKOCYTES: NORMAL
BASOPHILS # BLD AUTO: 0.1 X10E3/UL (ref 0–0.2)
BASOPHILS NFR BLD AUTO: 1 %
CLINICAL INFO: NORMAL
COMMENT , 490046: NORMAL
EOSINOPHIL # BLD AUTO: 0.2 X10E3/UL (ref 0–0.4)
EOSINOPHIL NFR BLD AUTO: 3 %
ERYTHROCYTE [DISTWIDTH] IN BLOOD BY AUTOMATED COUNT: 14 % (ref 12.3–15.4)
FERRITIN SERPL-MCNC: 152 NG/ML (ref 15–150)
HCT VFR BLD AUTO: 42.7 % (ref 34–46.6)
HGB BLD-MCNC: 14.1 G/DL (ref 11.1–15.9)
IMM GRANULOCYTES # BLD: 0 X10E3/UL (ref 0–0.1)
IMM GRANULOCYTES NFR BLD: 0 %
IMMUNOPHENOTYPING STUDY: NORMAL
IRON SATN MFR SERPL: 35 % (ref 15–55)
IRON SERPL-MCNC: 109 UG/DL (ref 27–159)
LYMPHOCYTES # BLD AUTO: 3 X10E3/UL (ref 0.7–3.1)
LYMPHOCYTES NFR BLD AUTO: 36 %
MCH RBC QN AUTO: 29.6 PG (ref 26.6–33)
MCHC RBC AUTO-ENTMCNC: 33 G/DL (ref 31.5–35.7)
MCV RBC AUTO: 90 FL (ref 79–97)
MONOCYTES # BLD AUTO: 0.6 X10E3/UL (ref 0.1–0.9)
MONOCYTES NFR BLD AUTO: 6 %
NEUTROPHILS # BLD AUTO: 4.7 X10E3/UL (ref 1.4–7)
NEUTROPHILS NFR BLD AUTO: 54 %
PATH INTERP BLD-IMP: ABNORMAL
PATH INTERP SPEC-IMP: NORMAL
PATH REV BLD -IMP: ABNORMAL
PATH REV BLD -IMP: ABNORMAL
PATH REV BLD -IMP: NORMAL
PATHOLOGIST NAME: ABNORMAL
PATHOLOGIST NAME: NORMAL
PLATELET # BLD AUTO: 518 X10E3/UL (ref 150–379)
RBC # BLD AUTO: 4.76 X10E6/UL (ref 3.77–5.28)
SPECIMEN SOURCE: NORMAL
TIBC SERPL-MCNC: 312 UG/DL (ref 250–450)
UIBC SERPL-MCNC: 203 UG/DL (ref 131–425)
VIABLE CELLS NFR SPEC: NORMAL %
WBC # BLD AUTO: 8.6 X10E3/UL (ref 3.4–10.8)

## 2018-07-23 ENCOUNTER — DOCUMENTATION ONLY (OUTPATIENT)
Dept: ONCOLOGY | Age: 48
End: 2018-07-23

## 2018-07-24 ENCOUNTER — DOCUMENTATION ONLY (OUTPATIENT)
Dept: ONCOLOGY | Age: 48
End: 2018-07-24

## 2018-07-24 RX ORDER — SERTRALINE HYDROCHLORIDE 50 MG/1
TABLET, FILM COATED ORAL
Qty: 135 TAB | Refills: 0 | Status: SHIPPED | OUTPATIENT
Start: 2018-07-24 | End: 2018-10-25 | Stop reason: SDUPTHER

## 2018-07-24 NOTE — PROGRESS NOTES
Request faxed to [facility] to review Flow Report Accession #:  [id number] per Dr. Chow Trina request.

## 2018-07-25 ENCOUNTER — TELEPHONE (OUTPATIENT)
Dept: ONCOLOGY | Age: 48
End: 2018-07-25

## 2018-07-25 ENCOUNTER — DOCUMENTATION ONLY (OUTPATIENT)
Dept: ONCOLOGY | Age: 48
End: 2018-07-25

## 2018-07-25 DIAGNOSIS — D72.820 LYMPHOCYTOSIS: ICD-10-CM

## 2018-07-25 DIAGNOSIS — D75.839 THROMBOCYTOSIS: Primary | ICD-10-CM

## 2018-07-25 NOTE — TELEPHONE ENCOUNTER
Per Teressa/Path request to clarify previous request for hem/path to review flow. Last bone marrow done per Yun Hess in 2014. Advised would forward to provider.

## 2018-07-31 ENCOUNTER — HOSPITAL ENCOUNTER (OUTPATIENT)
Dept: CT IMAGING | Age: 48
Discharge: HOME | End: 2018-07-31
Attending: INTERNAL MEDICINE
Payer: COMMERCIAL

## 2018-07-31 VITALS
WEIGHT: 209 LBS | RESPIRATION RATE: 20 BRPM | BODY MASS INDEX: 33.59 KG/M2 | HEIGHT: 66 IN | HEART RATE: 68 BPM | DIASTOLIC BLOOD PRESSURE: 91 MMHG | SYSTOLIC BLOOD PRESSURE: 119 MMHG | OXYGEN SATURATION: 98 %

## 2018-07-31 DIAGNOSIS — D75.839 THROMBOCYTOSIS: ICD-10-CM

## 2018-07-31 DIAGNOSIS — D72.820 LYMPHOCYTOSIS: ICD-10-CM

## 2018-07-31 LAB
BASOPHILS # BLD: 0.1 K/UL (ref 0–0.1)
BASOPHILS NFR BLD: 1 % (ref 0–1)
DIFFERENTIAL METHOD BLD: ABNORMAL
EOSINOPHIL # BLD: 0.3 K/UL (ref 0–0.4)
EOSINOPHIL NFR BLD: 3 % (ref 0–7)
ERYTHROCYTE [DISTWIDTH] IN BLOOD BY AUTOMATED COUNT: 13.7 % (ref 11.5–14.5)
HCT VFR BLD AUTO: 44.1 % (ref 35–47)
HGB BLD-MCNC: 14.3 G/DL (ref 11.5–16)
IMM GRANULOCYTES # BLD: 0 K/UL (ref 0–0.04)
IMM GRANULOCYTES NFR BLD AUTO: 0 % (ref 0–0.5)
LYMPHOCYTES # BLD: 3.6 K/UL (ref 0.8–3.5)
LYMPHOCYTES NFR BLD: 40 % (ref 12–49)
MCH RBC QN AUTO: 29.2 PG (ref 26–34)
MCHC RBC AUTO-ENTMCNC: 32.4 G/DL (ref 30–36.5)
MCV RBC AUTO: 90 FL (ref 80–99)
MONOCYTES # BLD: 0.5 K/UL (ref 0–1)
MONOCYTES NFR BLD: 6 % (ref 5–13)
NEUTS SEG # BLD: 4.6 K/UL (ref 1.8–8)
NEUTS SEG NFR BLD: 51 % (ref 32–75)
NRBC # BLD: 0 K/UL (ref 0–0.01)
NRBC BLD-RTO: 0 PER 100 WBC
PLATELET # BLD AUTO: 486 K/UL (ref 150–400)
PMV BLD AUTO: 8.8 FL (ref 8.9–12.9)
RBC # BLD AUTO: 4.9 M/UL (ref 3.8–5.2)
WBC # BLD AUTO: 9.1 K/UL (ref 3.6–11)

## 2018-07-31 PROCEDURE — 88342 IMHCHEM/IMCYTCHM 1ST ANTB: CPT | Performed by: INTERNAL MEDICINE

## 2018-07-31 PROCEDURE — 85025 COMPLETE CBC W/AUTO DIFF WBC: CPT

## 2018-07-31 PROCEDURE — 99152 MOD SED SAME PHYS/QHP 5/>YRS: CPT

## 2018-07-31 PROCEDURE — 74011250636 HC RX REV CODE- 250/636: Performed by: INTERNAL MEDICINE

## 2018-07-31 PROCEDURE — 88341 IMHCHEM/IMCYTCHM EA ADD ANTB: CPT | Performed by: INTERNAL MEDICINE

## 2018-07-31 PROCEDURE — 88305 TISSUE EXAM BY PATHOLOGIST: CPT | Performed by: INTERNAL MEDICINE

## 2018-07-31 PROCEDURE — 77030014115

## 2018-07-31 PROCEDURE — 88313 SPECIAL STAINS GROUP 2: CPT | Performed by: INTERNAL MEDICINE

## 2018-07-31 PROCEDURE — 77030028872 HC BN BIOP NDL ON CNTRL TY TELE -C

## 2018-07-31 PROCEDURE — 88365 INSITU HYBRIDIZATION (FISH): CPT | Performed by: INTERNAL MEDICINE

## 2018-07-31 PROCEDURE — 38221 DX BONE MARROW BIOPSIES: CPT

## 2018-07-31 PROCEDURE — 74011000250 HC RX REV CODE- 250: Performed by: RADIOLOGY

## 2018-07-31 PROCEDURE — 88311 DECALCIFY TISSUE: CPT | Performed by: INTERNAL MEDICINE

## 2018-07-31 PROCEDURE — 36415 COLL VENOUS BLD VENIPUNCTURE: CPT

## 2018-07-31 PROCEDURE — 77030003666 HC NDL SPINAL BD -A

## 2018-07-31 RX ORDER — LIDOCAINE HYDROCHLORIDE 10 MG/ML
5 INJECTION INFILTRATION; PERINEURAL
Status: COMPLETED | OUTPATIENT
Start: 2018-07-31 | End: 2018-07-31

## 2018-07-31 RX ORDER — MIDAZOLAM HYDROCHLORIDE 1 MG/ML
5 INJECTION, SOLUTION INTRAMUSCULAR; INTRAVENOUS
Status: DISCONTINUED | OUTPATIENT
Start: 2018-07-31 | End: 2018-08-04 | Stop reason: HOSPADM

## 2018-07-31 RX ORDER — FENTANYL CITRATE 50 UG/ML
100 INJECTION, SOLUTION INTRAMUSCULAR; INTRAVENOUS
Status: DISCONTINUED | OUTPATIENT
Start: 2018-07-31 | End: 2018-08-04 | Stop reason: HOSPADM

## 2018-07-31 RX ADMIN — LIDOCAINE HYDROCHLORIDE 5 ML: 10 INJECTION, SOLUTION INFILTRATION; PERINEURAL at 10:35

## 2018-07-31 RX ADMIN — MIDAZOLAM 1 MG: 1 INJECTION INTRAMUSCULAR; INTRAVENOUS at 10:05

## 2018-07-31 RX ADMIN — FENTANYL CITRATE 25 MCG: 50 INJECTION, SOLUTION INTRAMUSCULAR; INTRAVENOUS at 10:09

## 2018-07-31 RX ADMIN — MIDAZOLAM 1 MG: 1 INJECTION INTRAMUSCULAR; INTRAVENOUS at 10:13

## 2018-07-31 RX ADMIN — FENTANYL CITRATE 25 MCG: 50 INJECTION, SOLUTION INTRAMUSCULAR; INTRAVENOUS at 10:18

## 2018-07-31 RX ADMIN — MIDAZOLAM 1 MG: 1 INJECTION INTRAMUSCULAR; INTRAVENOUS at 10:18

## 2018-07-31 RX ADMIN — MIDAZOLAM 1 MG: 1 INJECTION INTRAMUSCULAR; INTRAVENOUS at 10:09

## 2018-07-31 RX ADMIN — FENTANYL CITRATE 25 MCG: 50 INJECTION, SOLUTION INTRAMUSCULAR; INTRAVENOUS at 10:05

## 2018-07-31 RX ADMIN — FENTANYL CITRATE 25 MCG: 50 INJECTION, SOLUTION INTRAMUSCULAR; INTRAVENOUS at 10:14

## 2018-07-31 NOTE — IP AVS SNAPSHOT
Patrick Clonts 
 
 
 [address] [facility] 
[phone] Patient: Erika Malik MRN: [id number] ZGL:[id number][id number] About your hospitalization You were admitted on:  July 31, 2018 You last received care in the:  [facility] You were discharged on:  July 31, 2018 Why you were hospitalized Your primary diagnosis was:  Not on File Follow-up Information None Discharge Orders None A check levar indicates which time of day the medication should be taken. My Medications ASK your doctor about these medications Instructions Each Dose to Equal  
 Morning Noon Evening Bedtime ALPRAZolam 0.5 mg tablet Commonly known as:  Charlett Frankfort Your last dose was: Your next dose is: Take 0.5 Tabs by mouth nightly as needed for Anxiety or Sleep. Max Daily Amount: 0.25 mg.  
 0.25 mg  
    
   
   
   
  
 aspirin delayed-release 81 mg tablet Your last dose was: Your next dose is: Take  by mouth daily. b complex vitamins tablet Your last dose was: Your next dose is: Take 1 Tab by mouth daily. 1 Tab CLARITIN 10 mg tablet Generic drug:  loratadine Your last dose was: Your next dose is: Take 10 mg by mouth daily. 10 mg  
    
   
   
   
  
 cranberry extract 450 mg Tab tablet Your last dose was: Your next dose is: Take 450 mg by mouth. 450 mg  
    
   
   
   
  
 CURCUMIN Your last dose was: Your next dose is:    
   
   
 by Does Not Apply route. cyclobenzaprine 10 mg tablet Commonly known as:  FLEXERIL Your last dose was: Your next dose is: Take 10 mg by mouth as needed for Muscle Spasm(s). Takes a few x a month max  
 10 mg  FISH OIL PO  
   
 Your last dose was: Your next dose is: Take 1,000 mg by mouth daily. 1000 mg  
    
   
   
   
  
 garlic Cap Your last dose was: Your next dose is: Take  by mouth.  
     
   
   
   
  
 levonorgestrel-ethinyl estradiol 0.15 mg-30 mcg 3mpk Commonly known as:  Lubertha Bears Your last dose was: Your next dose is: TAKE 1 TABLET BY MOUTH EVERY DAY  
     
   
   
   
  
 multivitamin tablet Commonly known as:  ONE A DAY Your last dose was: Your next dose is: Take 1 Tab by mouth daily. 1 Tab  
    
   
   
   
  
 sertraline 50 mg tablet Commonly known as:  ZOLOFT Your last dose was: Your next dose is: TAKE 1 AND 1/2 TABLETS BY MOUTH DAILY  
     
   
   
   
  
 SLO-NIACIN PO Your last dose was: Your next dose is: Take 500 mg by mouth daily. 500 mg  
    
   
   
   
  
 spironolactone 50 mg tablet Commonly known as:  ALDACTONE Your last dose was: Your next dose is: Take 1 Tab by mouth two (2) times a day. 50 mg VENTOLIN HFA 90 mcg/actuation inhaler Generic drug:  albuterol Your last dose was: Your next dose is:    
   
   
 INHALE 2 PUFFS BY MOUTH EVERY 6 HOURS AS NEEDED FOR WHEEZING  
     
   
   
   
  
 VITAMIN D3 1,000 unit Cap Generic drug:  cholecalciferol Your last dose was: Your next dose is: Take  by mouth daily. Discharge Instructions Bone Marrow Aspiration and Biopsy: What to Expect at [facility] Your Recovery The biopsy site may feel sore for several days. It can help to walk, take pain medicine, and put ice packs on the site. You will probably be able to return to work and your usual activities the day after the procedure.  Your doctor or nurse will call you with the results of your test. 
 This care sheet gives you a general idea about how long it will take for you to recover. But each person recovers at a different pace. Follow the steps below to get better as quickly as possible. How can you care for yourself at home? Activity ? · Rest when you feel tired. Getting enough sleep will help you recover. ? · You may drive when you are no longer taking pain pills and can quickly move your foot from the gas pedal to the brake. You must also be able to sit comfortably for a long period of time, even if you do not plan to go far. You might get caught in traffic. ? · Most people are able to return to work the day after the procedure. Medicines ? · Your doctor will tell you if and when you can restart your medicines. He or she will also give you instructions about taking any new medicines. ? · If you take blood thinners, such as warfarin (Coumadin), clopidogrel (Plavix), or aspirin, be sure to talk to your doctor. He or she will tell you if and when to start taking those medicines again. Make sure that you understand exactly what your doctor wants you to do. ? · Be safe with medicines. Take pain medicines exactly as directed. ¨ If the doctor gave you a prescription medicine for pain, take it as prescribed. ¨ If you are not taking a prescription pain medicine, take an over-the-counter medicine such as acetaminophen (Tylenol), ibuprofen (Advil, Motrin), or naproxen (Aleve). Read and follow all instructions on the label. ¨ Do not take two or more pain medicines at the same time unless the doctor told you to. Many pain medicines have acetaminophen, which is Tylenol. Too much acetaminophen (Tylenol) can be harmful. ? · If you think your pain medicine is making you sick to your stomach: 
¨ Take your medicine after meals (unless your doctor has told you not to). ¨ Ask your doctor for a different pain medicine. ? · If your doctor prescribed antibiotics, take them as directed.  Do not stop taking them just because you feel better. ?Ice ? · Put ice or a cold pack on the biopsy site for 10 to 20 minutes at a time. Put a thin cloth between the ice and your skin. Follow-up care is a key part of your treatment and safety. Be sure to make and go to all appointments, and call your doctor if you are having problems. It's also a good idea to know your test results and keep a list of the medicines you take. When should you call for help? Call 911 anytime you think you may need emergency care. For example, call if: 
? · You passed out (lost consciousness). ?Call your doctor now or seek immediate medical care if: 
? · You have signs of infection, such as: 
¨ Increased pain, swelling, warmth, or redness. ¨ Red streaks leading from the biopsy site. ¨ Pus draining from the biopsy site. ¨ Swollen lymph nodes in your neck, armpits, or groin. ¨ A fever. ? Watch closely for any changes in your health, and be sure to contact your doctor if: 
? · You are not getting better as expected. Where can you learn more? Go to http://margaret-dano.info/. Enter E148 in the search box to learn more about \"Bone Marrow Aspiration and Biopsy: What to Expect at Home. \" Current as of: October 14, 2016 Content Version: 11.4 © 8794-9609 Healthwise, Incorporated. Care instructions adapted under license by [employer] (which disclaims liability or warranty for this information). If you have questions about a medical condition or this instruction, always ask your healthcare professional. [place] [address] any warranty or liability for your use of this information. Sedation for a Medical Procedure: After Your Visit  Instructions. Sedatives are used to relax you for a procedure. You may or may not be awake during the procedure. Common side effects of sedation medications include:    
 
            Drowsiness, dizziness, euphoria, sleepiness or confusion. I 
            Unsteady gait. Loss of fine muscle control and delayed reaction time. Visual                     disturbances, difficulty focusing and blurred vision. Impaired memory recall. Follow-up care is a key component for your health and safety. Be sure to make and go to all medical appointments. Call your doctor if you are having problems. It's also a good idea to keep a list of your allergies, medicines with doses and test results on hand Home care following your sedation procedure: You may experience some of these side effects or you may not be aware of subtle changes in your behavior or reaction time. Because you received these medications, we are giving you the following instructions: Activity For your safety, you should not drive until the medicine wears off and you can think clearly and react easily. Do not drive for 24 hours. Rest when you feel tired. Getting enough sleep will help you recover. Diet You can eat your normal diet. If your stomach is upset, try bland, low-fat foods like plain rice, broiled chicken, toast, and yogurt. Drink plenty of fluids unless your doctor advised you to limit your fluids. Do not consume alcoholic beverages for 24 hours. Instructions Do not make important personal, business, or legal decisions for 24 hours. Move slowly and carefully, do not make sudden position changes. Be alert for dizziness or lightheadedness and move accordingly. Have a responsible person assist you. Do not drive for 24 hours. Do not operate equipment for 24 hours. [employer] mowers, power tools, kitchen appliances, etc.) Discharge medications: 
Resume prior to test medications as prescribed by your personal physician. If you have any questions or concerns call Radiology RN at [phone] After hours call Radiology on-call at [phone] Call [phone] any time you think you may need emergency care. For example:  
             Call if you passed out (lost consciousness). The medicine is not wearing off and you cannot think clearly. Watch closely for changes in your health, and be sure to contact your doctor if you have any problems. Where can you learn more? Go to Urgent.ly.be Enter R064 in the search box to learn more about \"Sedation for a Medical Procedure: After Your Visit. \" Introducing [facility]! Dear Gilma Mcmanus: Thank you for requesting a SueEasy account. Our records indicate that you already have an active SueEasy account. You can access your account anytime at [email or web address] Did you know that you can access your hospital and ER discharge instructions at any time in SueEasy? You can also review all of your test results from your hospital stay or ER visit. Additional Information If you have questions, please visit the Frequently Asked Questions section of the SueEasy website at [email or web address] Remember, SueEasy is NOT to be used for urgent needs. For medical emergencies, dial 911. Now available from your iPhone and Android! Introducing [facility] [employer] As a [facility] patient, I wanted to make you aware of our electronic visit tool called Miguel Angel [employer]. [facility] [employer] 24/7 allows you to connect within minutes with a medical provider 24 hours a day, seven days a week via a mobile device or tablet or logging into a secure website from your computer. You can access Miguel Angel Peace from anywhere in the [place].  
 
A virtual visit might be right for you when you have a simple condition and feel like you just dont want to get out of bed, or cant get away from work for an appointment, when your regular [facility] provider is not available (evenings, weekends or holidays), or when youre out of town and need minor care. Electronic visits cost only $49 and if the [employer] 24/7 provider determines a prescription is needed to treat your condition, one can be electronically transmitted to a nearby pharmacy*. Please take a moment to enroll today if you have not already done so. The enrollment process is free and takes just a few minutes. To enroll, please download the [employer] 24/7 tevin to your tablet or phone, or visit [email or web address] to enroll on your computer. And, as an [address] [place] [address] patient with a "[employer] account, the results of your visits will be scanned into your electronic medical record and your primary care provider will be able to view the scanned results. We urge you to continue to see your regular [employer] provider for your ongoing medical care. And while your primary care provider may not be the one available when you seek a [employer] virtual visit, the peace of mind you get from getting a real diagnosis real time can be priceless. For more information on Livem[employer], view our Frequently Asked Questions (FAQs) at [email or web address]. Sincerely, 
 
Tony Valiente MD 
Chief Medical Officer [employer] *:  certain medications cannot be prescribed via Livem[employer] Unresulted Labs-Please follow up with your PCP about these lab tests Order Current Status CT BX BONE MARROW DIAGNOSTIC In process Providers Seen During Your Hospitalization Provider Specialty Primary office phone Astrid Homans, [address] [place] [address] Oncology [phone] Your Primary Care Physician (PCP) Primary Care Physician Office Phone Office Fax Kristin Dunn [phone] You are allergic to the following Allergen Reactions Crestor (Rosuvastatin) Myalgia Insomnia and muscle pain Flonase (Fluticasone) Other (comments) Cataracts enlarged Hydrochlorothiazide Other (comments) Insomnia, headache, ringing in ears, anxious Nortriptyline Other (comments) Increased depression Other Medication Other (comments) Pt is avoiding Nasal CS d/t cataracts Sulfa (Sulfonamide Antibiotics) Hives Recent Documentation Breastfeeding? OB Status Smoking Status No Medically Induced Former Smoker Emergency Contacts Name Discharge Info Relation Home Work Mobile Wendy Solorio CAREGIVER [3] Friend [5] [phone] Patient Belongings The following personal items are in your possession at time of discharge: 
     Visual Aid: At bedside Please provide this summary of care documentation to your next provider. Signatures-by signing, you are acknowledging that this After Visit Summary has been reviewed with you and you have received a copy. Patient Signature:  ____________________________________________________________ Date:  ____________________________________________________________  
  
Josph Perfect Provider Signature:  ____________________________________________________________ Date:  ____________________________________________________________

## 2018-07-31 NOTE — H&P
Radiology History and Physical    Patient: Davis Kruse 50 y.o. female       Chief Complaint: No chief complaint on file. History of Present Illness: Chronic thrombocytosis presenting for bone marrow biopsy. No current complaints. History:    Past Medical History:   Diagnosis Date    Allergic rhinitis 10/25/2011    Anxiety attacks 6/21/2010    AR (allergic rhinitis)     spring    Asthma     Asthma, mild intermittent 6/21/2010    Benign essential hypertension 2/24/2015    [id number]    Benign liver cyst 5/2015    Dr Valerie Casper     right eye    Cataract of right eye 10/25/2011    Cholesterol serum increased     Chronic back pain     anatomic    Chronic Mechanical back pain 10/25/2011    Controlled substance agreement signed 5/24/2018 11-29-17    Depression     Dog bite(E906.0)     Td and Rabies series    Dysmenorrhea     Dysthymia     Fibrocystic breast changes 10/25/2011    History of pyelonephritis, 1992 10/25/2011    Hypercholesterolemia with high triglycerides 12/13/2011    Hypercholesterolemia, mild 6/21/2010    Lymphocytosis 3/22/2016    Dr Taylor Uriarte, 2015    Microhematuria 12/15/2015    7-2014 [facility] Dr Dread Saenz.  KUB neg excpet small stone vs phlebolith    Muscle contraction headaches & Neck Muscle Spasms 4/10/2017    NAFL (nonalcoholic fatty liver) 3/05/8190    Dr Cristy Aguayo PMS (premenstrual syndrome) 6/21/2010    Primary dysmenorrhea 6/21/2010    Pyelonephritis     Raynaud's syndrome     Rosacea     Thrombocytosis ([address] 3/22/2016    Dr Taylor Uriarte 2015    Vegetarian     Vitamin D deficiency 12/13/2011     Family History   Problem Relation Age of Onset    High Cholesterol Mother      high TG's in the 80's    Hypertension Mother     Thyroid Disease Mother     Other Mother      benign ovarian cysts    Anxiety Mother     Depression Mother     Diabetes Father      type 2    Cancer Father      Hodgkins    Arrhythmia Father 72     Ablation; chemo/radiation induced    Heart Surgery Father 72     stents placed    Anxiety Maternal Grandmother     Heart Disease Maternal Grandmother       80; h/o A. 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    Smoking status: Former Smoker     Types: Cigarettes     Quit date: 10/25/1991    Smokeless tobacco: Never Used      Comment: smoked lightly for about 5 months    Alcohol use 0.0 oz/week     0 Standard drinks or equivalent per week      Comment: very seldom    Drug use: No    Sexual activity: No     Other Topics Concern    Caffeine Concern Yes     3 12 oz cans of diet Mountain Dews a day    Sleep Concern Yes     was going for sleep eval per Dr Estuardo Vazquez 2015 to r/o JUAN MANUEL, but she never went    Stress Concern Yes     principal at work is stressful, in the process of changing jobs, interviewing    Special Diet No     stress eating    Exercise Yes     walks dog 2-4 x a day x 3-6k steps each time x 30-60 minutes each     Social History Narrative       Allergies:    Allergies   Allergen Reactions    Crestor [Rosuvastatin] Myalgia     Insomnia and muscle pain    Flonase [Fluticasone] Other (comments)     Cataracts enlarged    Hydrochlorothiazide Other (comments)     Insomnia, headache, ringing in ears, anxious    Nortriptyline Other (comments)     Increased depression    Other Medication Other (comments)     Pt is avoiding Nasal CS d/t cataracts    Sulfa (Sulfonamide Antibiotics) Hives       Current Medications:  Current Outpatient Prescriptions   Medication Sig    sertraline (ZOLOFT) 50 mg tablet TAKE 1 AND 1/2 TABLETS BY MOUTH DAILY    turmeric (CURCUMIN) by Does Not Apply route.  cranberry extract 450 mg tab tablet Take 450 mg by mouth.  levonorgestrel-ethinyl estradiol (SEASONALE) 0.15 mg-30 mcg 3MPk TAKE 1 TABLET BY MOUTH EVERY DAY    ALPRAZolam (XANAX) 0.5 mg tablet Take 0.5 Tabs by mouth nightly as needed for Anxiety or Sleep. Max Daily Amount: 0.25 mg.    spironolactone (ALDACTONE) 50 mg tablet Take 1 Tab by mouth two (2) times a day.  b complex vitamins tablet Take 1 Tab by mouth daily.  garlic cap Take  by mouth.  SLO-NIACIN PO Take 500 mg by mouth daily.  DOCOSAHEXANOIC ACID/EPA (FISH OIL PO) Take 1,000 mg by mouth daily.  multivitamin (ONE A DAY) tablet Take 1 Tab by mouth daily.  cholecalciferol (VITAMIN D3) 1,000 unit cap Take  by mouth daily.  loratadine (CLARITIN) 10 mg tablet Take 10 mg by mouth daily.  aspirin delayed-release 81 mg tablet Take  by mouth daily.  VENTOLIN HFA 90 mcg/actuation inhaler INHALE 2 PUFFS BY MOUTH EVERY 6 HOURS AS NEEDED FOR WHEEZING    cyclobenzaprine (FLEXERIL) 10 mg tablet Take 10 mg by mouth as needed for Muscle Spasm(s). Takes a few x a month max     Current Facility-Administered Medications   Medication Dose Route Frequency    fentaNYL citrate (PF) injection 100 mcg  100 mcg IntraVENous RAD PRN    midazolam (VERSED) injection 5 mg  5 mg IntraVENous RAD PRN        Physical Exam:  GENERAL: alert, cooperative, no distress, appears stated age  LUNG: clear to auscultation bilaterally  HEART: regular rate and rhythm  ABD: Non tender, non distended. Alerts:    Hospital Problems  Date Reviewed: 5/24/2018    None          Laboratory:      Recent Labs      07/31/18   0922   HGB  14.3   HCT  44.1   WBC  9.1   PLT  486*         Plan of Care/Planned Procedure:  Risks, benefits, and alternatives reviewed with patient and she agrees to proceed with the procedure. Deemed appropriate or moderate sedation with versed and fentanyl.       Luis Garcia MD

## 2018-07-31 NOTE — PROGRESS NOTES
Patient rec'd to [facility]. Raine Zuñiga will be waiting in waiting room and will provide transportation home. 0930:  Pt oriented to [facility] and explanation of immediate plan of care provided. Pt's questions answered. #22 PIV inserted into R AC x 1 attempt; labs drawn and this time and delivered to lab. Nsg assessment complete:  BBS CTA; S1S2. Mallampati score of II. Pt with no c/o pain distress. Awaiting MD arrival for consent. 5314: Dr. Omar Pineda present at bedside discussing procedure/sedation; Consent obtained at this time. 6341:  Patient transported to CT suite via stretcher. Attached to monitor, IV fluids, O2 via NC a 2 lpm.  Baseline VS stable. 1015:  Dr Omar Pineda arrived in [address]. Time out complete. 1025:  Procedure compete and dressing applied to right posterior hip/biospy site. Patient rec'd total meds:  Fentanyl 100mcg IV and Versed 4mg IV. VS remained stable throughout and pt with no signs of complications. 1030:  Pt returned to [facility] area via stretcher. VS stable. Pt is awake and conversive and tolerating liquids/snack. 1128:  Dishcarge/aftercare instructions rev'd with patient and friend, Reeves Dakin. Questions answered and both verbalize understanding. PIV removed- see flowsheet. Dsg to biopsy site CDI with no drainage or signs of complications present. 1130:  Pt transported to vehicle via wheelchair. Pt with no c/o pain or distress.

## 2018-07-31 NOTE — DISCHARGE INSTRUCTIONS
Bone Marrow Aspiration and Biopsy: What to Expect at Home  Your Recovery  The biopsy site may feel sore for several days. It can help to walk, take pain medicine, and put ice packs on the site. You will probably be able to return to work and your usual activities the day after the procedure. Your doctor or nurse will call you with the results of your test.  This care sheet gives you a general idea about how long it will take for you to recover. But each person recovers at a different pace. Follow the steps below to get better as quickly as possible. How can you care for yourself at home? Activity  ? · Rest when you feel tired. Getting enough sleep will help you recover. ? · You may drive when you are no longer taking pain pills and can quickly move your foot from the gas pedal to the brake. You must also be able to sit comfortably for a long period of time, even if you do not plan to go far. You might get caught in traffic. ? · Most people are able to return to work the day after the procedure. Medicines  ? · Your doctor will tell you if and when you can restart your medicines. He or she will also give you instructions about taking any new medicines. ? · If you take blood thinners, such as warfarin (Coumadin), clopidogrel (Plavix), or aspirin, be sure to talk to your doctor. He or she will tell you if and when to start taking those medicines again. Make sure that you understand exactly what your doctor wants you to do. ? · Be safe with medicines. Take pain medicines exactly as directed. ¨ If the doctor gave you a prescription medicine for pain, take it as prescribed. ¨ If you are not taking a prescription pain medicine, take an over-the-counter medicine such as acetaminophen (Tylenol), ibuprofen (Advil, Motrin), or naproxen (Aleve). Read and follow all instructions on the label. ¨ Do not take two or more pain medicines at the same time unless the doctor told you to.  Many pain medicines have acetaminophen, which is Tylenol. Too much acetaminophen (Tylenol) can be harmful. ? · If you think your pain medicine is making you sick to your stomach:  ¨ Take your medicine after meals (unless your doctor has told you not to). ¨ Ask your doctor for a different pain medicine. ? · If your doctor prescribed antibiotics, take them as directed. Do not stop taking them just because you feel better. ?Ice  ? · Put ice or a cold pack on the biopsy site for 10 to 20 minutes at a time. Put a thin cloth between the ice and your skin. Follow-up care is a key part of your treatment and safety. Be sure to make and go to all appointments, and call your doctor if you are having problems. It's also a good idea to know your test results and keep a list of the medicines you take. When should you call for help? Call 911 anytime you think you may need emergency care. For example, call if:  ? · You passed out (lost consciousness). ?Call your doctor now or seek immediate medical care if:  ? · You have signs of infection, such as:  ¨ Increased pain, swelling, warmth, or redness. ¨ Red streaks leading from the biopsy site. ¨ Pus draining from the biopsy site. ¨ Swollen lymph nodes in your neck, armpits, or groin. ¨ A fever. ? Watch closely for any changes in your health, and be sure to contact your doctor if:  ? · You are not getting better as expected. Where can you learn more? Go to http://margaret-dano.info/. Enter E148 in the search box to learn more about \"Bone Marrow Aspiration and Biopsy: What to Expect at Home. \"  Current as of: October 14, 2016  Content Version: 11.4  © 6743-2551 [employer]. Care instructions adapted under license by [employer] (which disclaims liability or warranty for this information).  If you have questions about a medical condition or this instruction, always ask your healthcare professional. [employer] disclaims any warranty or liability for your use of this information. Sedation for a Medical Procedure: After Your Visit  Instructions. Sedatives are used to relax you for a procedure. You may or may not be awake during the procedure. Common side effects of sedation medications include:                   Drowsiness, dizziness, euphoria, sleepiness or confusion. I              Unsteady gait. Loss of fine muscle control and delayed reaction time. Visual                     disturbances, difficulty focusing and blurred vision. Impaired memory recall. Follow-up care is a key component for your health and safety. Be sure to make and go to all medical appointments. Call your doctor if you are having problems. It's also a good idea to keep a list of your allergies, medicines with doses and test results on hand    Home care following your sedation procedure: You may experience some of these side effects or you may not be aware of subtle changes in your behavior or reaction time. Because you received these medications, we are giving you the following instructions: Activity   For your safety, you should not drive until the medicine wears off and you can think clearly and react easily. Do not drive for 24 hours. Rest when you feel tired. Getting enough sleep will help you recover. Diet    You can eat your normal diet. If your stomach is upset, try bland, low-fat foods like plain rice, broiled chicken, toast, and yogurt. Drink plenty of fluids unless your doctor advised you to limit your fluids. Do not consume alcoholic beverages for 24 hours. Instructions  Do not make important personal, business, or legal decisions for 24 hours. Move slowly and carefully, do not make sudden position changes. Be alert for dizziness or lightheadedness and move accordingly. Have a responsible person assist you. Do not drive for 24 hours. Do not operate equipment for 24 hours. Gen[employer] mowers, power tools, kitchen appliances, etc.)    Discharge medications:  Resume prior to test medications as prescribed by your personal physician. If you have any questions or concerns call Radiology RN at [phone]  After hours call Radiology on-call at [phone]    Call [phone] any time you think you may need emergency care. For example:                Call if you passed out (lost consciousness). The medicine is not wearing off and you cannot think clearly. Watch closely for changes in your health, and be sure to contact your doctor if you have any problems. Where can you learn more? Go to [email or web address]   Enter G818 in the search box to learn more about \"Sedation for a Medical Procedure: After Your Visit. \"

## 2018-07-31 NOTE — IP AVS SNAPSHOT
[address] 
[phone] Patient: Joanna Rodriguez MRN: [id number] [id number] A check levar indicates which time of day the medication should be taken. My Medications ASK your doctor about these medications Instructions Each Dose to Equal  
 Morning Noon Evening Bedtime ALPRAZolam 0.5 mg tablet Commonly known as:  Jimmie Hamilton Your last dose was: Your next dose is: Take 0.5 Tabs by mouth nightly as needed for Anxiety or Sleep. Max Daily Amount: 0.25 mg.  
 0.25 mg  
    
   
   
   
  
 aspirin delayed-release 81 mg tablet Your last dose was: Your next dose is: Take  by mouth daily. b complex vitamins tablet Your last dose was: Your next dose is: Take 1 Tab by mouth daily. 1 Tab CLARITIN 10 mg tablet Generic drug:  loratadine Your last dose was: Your next dose is: Take 10 mg by mouth daily. 10 mg  
    
   
   
   
  
 cranberry extract 450 mg Tab tablet Your last dose was: Your next dose is: Take 450 mg by mouth. 450 mg  
    
   
   
   
  
 CURCUMIN Your last dose was: Your next dose is:    
   
   
 by Does Not Apply route. cyclobenzaprine 10 mg tablet Commonly known as:  FLEXERIL Your last dose was: Your next dose is: Take 10 mg by mouth as needed for Muscle Spasm(s). Takes a few x a month max  
 10 mg FISH OIL PO Your last dose was: Your next dose is: Take 1,000 mg by mouth daily. 1000 mg  
    
   
   
   
  
 garlic Cap Your last dose was: Your next dose is: Take  by mouth.  
     
   
   
   
  
 levonorgestrel-ethinyl estradiol 0.15 mg-30 mcg 3mpk Commonly known as:  Laura Hester Your last dose was: Your next dose is: TAKE 1 TABLET BY MOUTH EVERY DAY  
     
   
   
   
  
 multivitamin tablet Commonly known as:  ONE A DAY Your last dose was: Your next dose is: Take 1 Tab by mouth daily. 1 Tab  
    
   
   
   
  
 sertraline 50 mg tablet Commonly known as:  ZOLOFT Your last dose was: Your next dose is: TAKE 1 AND 1/2 TABLETS BY MOUTH DAILY  
     
   
   
   
  
 SLO-NIACIN PO Your last dose was: Your next dose is: Take 500 mg by mouth daily. 500 mg  
    
   
   
   
  
 spironolactone 50 mg tablet Commonly known as:  ALDACTONE Your last dose was: Your next dose is: Take 1 Tab by mouth two (2) times a day. 50 mg VENTOLIN HFA 90 mcg/actuation inhaler Generic drug:  albuterol Your last dose was: Your next dose is:    
   
   
 INHALE 2 PUFFS BY MOUTH EVERY 6 HOURS AS NEEDED FOR WHEEZING  
     
   
   
   
  
 VITAMIN D3 1,000 unit Cap Generic drug:  cholecalciferol Your last dose was: Your next dose is: Take  by mouth daily.

## 2018-08-07 NOTE — PROGRESS NOTES
Call to [address] [place] [address] Pathlogy/Karely WAGNER to check status of final report.

## 2018-08-15 ENCOUNTER — DOCUMENTATION ONLY (OUTPATIENT)
Dept: ONCOLOGY | Age: 48
End: 2018-08-15

## 2018-08-16 NOTE — PROGRESS NOTES
HIPAA verified. Advised of provider note. Patient would like call back regarding why abnormalities showed in blood work and not seen in bone marrow. Advised would forward to provider.

## 2018-10-08 DIAGNOSIS — Z30.41 ENCOUNTER FOR BIRTH CONTROL PILLS MAINTENANCE: ICD-10-CM

## 2018-10-08 DIAGNOSIS — I10 BENIGN ESSENTIAL HYPERTENSION: ICD-10-CM

## 2018-10-08 RX ORDER — SPIRONOLACTONE 50 MG/1
50 TABLET, FILM COATED ORAL 2 TIMES DAILY
Qty: 180 TAB | Refills: 1 | Status: CANCELLED | OUTPATIENT
Start: 2018-10-08

## 2018-10-08 RX ORDER — LEVONORGESTREL AND ETHINYL ESTRADIOL 0.15-0.03
KIT ORAL
Qty: 91 TAB | Refills: 3 | Status: CANCELLED | OUTPATIENT
Start: 2018-10-08

## 2018-10-08 NOTE — TELEPHONE ENCOUNTER
Pharm on file verified. They are requesting a 90-day supply on the following meds. Last office visit 05/24/18  Last refill 05/24/18    Requested Prescriptions     Pending Prescriptions Disp Refills    levonorgestrel-ethinyl estradiol (SEASONALE) 0.15 mg-30 mcg 3MPk 91 Tab 3     Sig: TAKE 1 TABLET BY MOUTH EVERY DAY    spironolactone (ALDACTONE) 50 mg tablet 180 Tab 1     Sig: Take 1 Tab by mouth two (2) times a day.

## 2018-11-12 ENCOUNTER — TELEPHONE (OUTPATIENT)
Dept: FAMILY MEDICINE CLINIC | Age: 48
End: 2018-11-12

## 2018-11-12 NOTE — TELEPHONE ENCOUNTER
----- Message from Brandon Watters sent at 11/12/2018  4:49 PM EST -----  Regarding: Dr. Keira Humphreys requesting a call back in regards to having labs prior to her rescheduled appointment on December 26,2018. Pt best contact number is [phone].

## 2018-11-13 NOTE — TELEPHONE ENCOUNTER
Asked pt to send me a [employer] message 10 days before appt and we can order labs at that time. Pt states understanding.

## 2018-12-14 DIAGNOSIS — F41.0 ANXIETY ATTACK: ICD-10-CM

## 2018-12-14 DIAGNOSIS — F41.8 INSOMNIA SECONDARY TO DEPRESSION WITH ANXIETY: ICD-10-CM

## 2018-12-14 DIAGNOSIS — F51.05 INSOMNIA SECONDARY TO DEPRESSION WITH ANXIETY: ICD-10-CM

## 2018-12-16 RX ORDER — ALPRAZOLAM 0.5 MG/1
TABLET ORAL
Qty: 30 TAB | Refills: 0 | OUTPATIENT
Start: 2018-12-16 | End: 2018-12-26 | Stop reason: SDUPTHER

## 2018-12-16 NOTE — TELEPHONE ENCOUNTER
Future Appointments   Date Time Provider Kailyn Boyd   12/26/2018  1:45 PM Calli Meehan MD [facility] Belen Crocker

## 2018-12-17 ENCOUNTER — TELEPHONE (OUTPATIENT)
Dept: FAMILY MEDICINE CLINIC | Age: 48
End: 2018-12-17

## 2018-12-17 DIAGNOSIS — R73.03 PREDIABETES: ICD-10-CM

## 2018-12-17 DIAGNOSIS — E78.2 HYPERCHOLESTEROLEMIA WITH HYPERTRIGLYCERIDEMIA: ICD-10-CM

## 2018-12-17 DIAGNOSIS — I10 BENIGN ESSENTIAL HYPERTENSION: Primary | ICD-10-CM

## 2018-12-17 NOTE — TELEPHONE ENCOUNTER
Regarding: Non-Urgent Medical Question  ----- Message from [employer], [employer] sent at 12/16/2018  1:41 PM EST -----    Dear ,  I need an appointment to do lab work this week before my Dec 26 appointment with you. Also. My Xanax refill had technological issues and couldnt be refilled. Thanks,  Ree Watson    I have called in the xanax.

## 2018-12-17 NOTE — TELEPHONE ENCOUNTER
Pharmacy is requesting a refill for 90 day supply  .   Requested Prescriptions     Pending Prescriptions Disp Refills    sertraline (ZOLOFT) 50 mg tablet 135 Tab 3     LOV: May 24, 2018  Upcoming :  December 26, 2018   Last refill : : 10/25/2018  Pharmacy verified

## 2018-12-18 RX ORDER — SERTRALINE HYDROCHLORIDE 50 MG/1
TABLET, FILM COATED ORAL
Qty: 135 TAB | Refills: 1 | Status: CANCELLED | OUTPATIENT
Start: 2018-12-18

## 2018-12-18 NOTE — TELEPHONE ENCOUNTER
Called pharmacy to to let them know that we did 90 day supply back in Oct.  Pharmacist said he has it.

## 2018-12-18 NOTE — TELEPHONE ENCOUNTER
You can let her know I pended the lab orders. I see her comment about the Xanax. Not sure what technical issues she was referring to that she ran into. I see your note that you called it in now. Is she aware? Didn't see that you had responded or not, so was routing back to you in case you needed to.

## 2018-12-22 LAB
BUN SERPL-MCNC: 14 MG/DL (ref 6–24)
BUN/CREAT SERPL: 18 (ref 9–23)
CALCIUM SERPL-MCNC: 9.4 MG/DL (ref 8.7–10.2)
CHLORIDE SERPL-SCNC: 100 MMOL/L (ref 96–106)
CHOLEST SERPL-MCNC: 219 MG/DL (ref 100–199)
CO2 SERPL-SCNC: 19 MMOL/L (ref 20–29)
CREAT SERPL-MCNC: 0.79 MG/DL (ref 0.57–1)
EST. AVERAGE GLUCOSE BLD GHB EST-MCNC: 111 MG/DL
GLUCOSE SERPL-MCNC: 86 MG/DL (ref 65–99)
HBA1C MFR BLD: 5.5 % (ref 4.8–5.6)
HDLC SERPL-MCNC: 50 MG/DL
INTERPRETATION, 910389: NORMAL
LDLC SERPL CALC-MCNC: 135 MG/DL (ref 0–99)
POTASSIUM SERPL-SCNC: 4.9 MMOL/L (ref 3.5–5.2)
SODIUM SERPL-SCNC: 134 MMOL/L (ref 134–144)
TRIGL SERPL-MCNC: 168 MG/DL (ref 0–149)
VLDLC SERPL CALC-MCNC: 34 MG/DL (ref 5–40)

## 2018-12-26 ENCOUNTER — OFFICE VISIT (OUTPATIENT)
Dept: FAMILY MEDICINE CLINIC | Age: 48
End: 2018-12-26

## 2018-12-26 VITALS
HEART RATE: 89 BPM | RESPIRATION RATE: 16 BRPM | HEIGHT: 66 IN | SYSTOLIC BLOOD PRESSURE: 144 MMHG | TEMPERATURE: 97.9 F | DIASTOLIC BLOOD PRESSURE: 96 MMHG | WEIGHT: 215 LBS | BODY MASS INDEX: 34.55 KG/M2 | OXYGEN SATURATION: 97 %

## 2018-12-26 DIAGNOSIS — Z51.81 ENCOUNTER FOR MEDICATION MONITORING: ICD-10-CM

## 2018-12-26 DIAGNOSIS — E78.2 HYPERCHOLESTEROLEMIA WITH HYPERTRIGLYCERIDEMIA: ICD-10-CM

## 2018-12-26 DIAGNOSIS — F51.05 HYPOSOMNIA, INSOMNIA OR SLEEPLESSNESS ASSOCIATED WITH ANXIETY: ICD-10-CM

## 2018-12-26 DIAGNOSIS — Z79.899 CONTROLLED SUBSTANCE AGREEMENT SIGNED: ICD-10-CM

## 2018-12-26 DIAGNOSIS — I10 BENIGN ESSENTIAL HYPERTENSION: Primary | ICD-10-CM

## 2018-12-26 DIAGNOSIS — F41.8 INSOMNIA SECONDARY TO DEPRESSION WITH ANXIETY: ICD-10-CM

## 2018-12-26 DIAGNOSIS — F41.8 DEPRESSION WITH ANXIETY: ICD-10-CM

## 2018-12-26 DIAGNOSIS — R73.03 PREDIABETES: ICD-10-CM

## 2018-12-26 DIAGNOSIS — F41.9 HYPOSOMNIA, INSOMNIA OR SLEEPLESSNESS ASSOCIATED WITH ANXIETY: ICD-10-CM

## 2018-12-26 DIAGNOSIS — F51.05 INSOMNIA SECONDARY TO DEPRESSION WITH ANXIETY: ICD-10-CM

## 2018-12-26 DIAGNOSIS — F41.0 ANXIETY ATTACK: ICD-10-CM

## 2018-12-26 RX ORDER — AMLODIPINE BESYLATE 5 MG/1
5 TABLET ORAL DAILY
Qty: 30 TAB | Refills: 1 | Status: SHIPPED | OUTPATIENT
Start: 2018-12-26 | End: 2019-02-16 | Stop reason: SDUPTHER

## 2018-12-26 RX ORDER — VITAMIN E 1000 UNIT
1000 CAPSULE ORAL DAILY
COMMUNITY

## 2018-12-26 RX ORDER — ALPRAZOLAM 0.5 MG/1
TABLET ORAL
Qty: 30 TAB | Refills: 3 | Status: SHIPPED | OUTPATIENT
Start: 2018-12-26 | End: 2019-07-23 | Stop reason: SDUPTHER

## 2018-12-26 RX ORDER — CHOLECALCIFEROL (VITAMIN D3) 125 MCG
CAPSULE ORAL DAILY
COMMUNITY
End: 2021-11-15 | Stop reason: ALTCHOICE

## 2018-12-26 NOTE — PATIENT INSTRUCTIONS
High Blood Pressure: Care Instructions  Your Care Instructions    If your blood pressure is usually above 130/80, you have high blood pressure, or hypertension. That means the top number is 130 or higher or the bottom number is 80 or higher, or both. Despite what a lot of people think, high blood pressure usually doesn't cause headaches or make you feel dizzy or lightheaded. It usually has no symptoms. But it does increase your risk for heart attack, stroke, and kidney or eye damage. The higher your blood pressure, the more your risk increases. Your doctor will give you a goal for your blood pressure. Your goal will be based on your health and your age. Lifestyle changes, such as eating healthy and being active, are always important to help lower blood pressure. You might also take medicine to reach your blood pressure goal.  Follow-up care is a key part of your treatment and safety. Be sure to make and go to all appointments, and call your doctor if you are having problems. It's also a good idea to know your test results and keep a list of the medicines you take. How can you care for yourself at home? Medical treatment  · If you stop taking your medicine, your blood pressure will go back up. You may take one or more types of medicine to lower your blood pressure. Be safe with medicines. Take your medicine exactly as prescribed. Call your doctor if you think you are having a problem with your medicine. · Talk to your doctor before you start taking aspirin every day. Aspirin can help certain people lower their risk of a heart attack or stroke. But taking aspirin isn't right for everyone, because it can cause serious bleeding. · See your doctor regularly. You may need to see the doctor more often at first or until your blood pressure comes down. · If you are taking blood pressure medicine, talk to your doctor before you take decongestants or anti-inflammatory medicine, such as ibuprofen.  Some of these medicines can raise blood pressure. · Learn how to check your blood pressure at home. Lifestyle changes  · Stay at a healthy weight. This is especially important if you put on weight around the waist. Losing even 10 pounds can help you lower your blood pressure. · If your doctor recommends it, get more exercise. Walking is a good choice. Bit by bit, increase the amount you walk every day. Try for at least 30 minutes on most days of the week. You also may want to swim, bike, or do other activities. · Avoid or limit alcohol. Talk to your doctor about whether you can drink any alcohol. · Try to limit how much sodium you eat to less than 2,300 milligrams (mg) a day. Your doctor may ask you to try to eat less than 1,500 mg a day. · Eat plenty of fruits (such as bananas and oranges), vegetables, legumes, whole grains, and low-fat dairy products. · Lower the amount of saturated fat in your diet. Saturated fat is found in animal products such as milk, cheese, and meat. Limiting these foods may help you lose weight and also lower your risk for heart disease. · Do not smoke. Smoking increases your risk for heart attack and stroke. If you need help quitting, talk to your doctor about stop-smoking programs and medicines. These can increase your chances of quitting for good. When should you call for help? Call 911 anytime you think you may need emergency care. This may mean having symptoms that suggest that your blood pressure is causing a serious heart or blood vessel problem. Your blood pressure may be over 180/120.   For example, call 911 if:    · You have symptoms of a heart attack. These may include:  ? Chest pain or pressure, or a strange feeling in the chest.  ? Sweating. ? Shortness of breath. ? Nausea or vomiting. ? Pain, pressure, or a strange feeling in the back, neck, jaw, or upper belly or in one or both shoulders or arms. ? Lightheadedness or sudden weakness.   ? A fast or irregular heartbeat.     · You have symptoms of a stroke. These may include:  ? Sudden numbness, tingling, weakness, or loss of movement in your face, arm, or leg, especially on only one side of your body. ? Sudden vision changes. ? Sudden trouble speaking. ? Sudden confusion or trouble understanding simple statements. ? Sudden problems with walking or balance. ? A sudden, severe headache that is different from past headaches.     · You have severe back or belly pain.    Do not wait until your blood pressure comes down on its own. Get help right away.   Call your doctor now or seek immediate care if:    · Your blood pressure is much higher than normal (such as 180/120 or higher), but you don't have symptoms.     · You think high blood pressure is causing symptoms, such as:  ? Severe headache.  ? Blurry vision.    Watch closely for changes in your health, and be sure to contact your doctor if:    · Your blood pressure measures higher than your doctor recommends at least 2 times. That means the top number is higher or the bottom number is higher, or both.     · You think you may be having side effects from your blood pressure medicine. Where can you learn more? Go to http://margaret-dano.info/. Enter F170 in the search box to learn more about \"High Blood Pressure: Care Instructions. \"  Current as of: December 6, 2017  Content Version: 11.8  © 0040-9909 [employer]. Care instructions adapted under license by [employer] (which disclaims liability or warranty for this information). If you have questions about a medical condition or this instruction, always ask your healthcare professional. [place] [address] any warranty or liability for your use of this information.

## 2018-12-26 NOTE — PROGRESS NOTES
Chief Complaint   Patient presents with    Medication Evaluation     medication follow up     \"REVIEWED RECORD IN PREPARATION FOR VISIT AND HAVE OBTAINED THE NECESSARY DOCUMENTATION\"  1. Have you been to the ER, urgent care clinic since your last visit? Hospitalized since your last visit? No    2. Have you seen or consulted any other health care providers outside of the [address] [place] [address] since your last visit? Include any pap smears or colon screening.  No

## 2018-12-26 NOTE — PROGRESS NOTES
HISTORY OF PRESENT ILLNESS   HPI  Follow up hypertension, hyperlipidemia, prediabetes and discuss fasting labs that were done last week on 12-21-18. Admits her eating habits have not been that great but in general she has not been going overboard. Also not walking the dog as much for exercise. She was surprised to see her lab results were actually better compared to last check. She attributes that to being much less stressed since being at her new job since 6-2018. Really enjoying her new job much much better and her stress level is way down. Her BP still runs on higher end. But she is feeling much better. Feels less tenses and has not been having the tension headaches anymore. Also overall feels that the Zoloft is keeping her mood stable, but she still relies on taking 1/2 tablet of Xanax qhs to help her fall asleep, stay asleep soundly. Keeps her mind from racing all night, decreases night time panic attacks and enables her to rest so that she can awaken feeling refreshed and functional for the following day. REVIEW OF SYMPTOMS     Review of Systems   Constitutional: Negative. Eyes: Negative. Respiratory: Negative. Cardiovascular: Negative. Gastrointestinal: Negative. Genitourinary: Negative.     Neurological: Negative for dizziness and headaches.           PROBLEM LIST/MEDICAL HISTORY      Problem List  Date Reviewed: 12/26/2018          Codes Class Noted    Benign essential hypertension ICD-10-CM: I10  ICD-9-CM: 401.1  2/24/2015    Overview Signed 2/24/2015 10:21 AM by Debra Pack MD     [id number]             Hyposomnia, insomnia or sleeplessness associated with anxiety ICD-10-CM: F51.05, F41.9  ICD-9-CM: 293.84, 327.02  12/26/2018        Controlled substance agreement signed ICD-10-CM: Z79.899  ICD-9-CM: V58.69  5/24/2018    Overview Signed 5/24/2018  9:57 AM by Debra Pack MD     11-29-17             Prediabetes ICD-10-CM: R73.03  ICD-9-CM: 790.29 4/23/2017    Overview Signed 4/23/2017  9:16 PM by Antonio Jackson MD     Mild [id number]             Muscle contraction headaches & Neck Muscle Spasms ICD-10-CM: J08.466  ICD-9-CM: 307.81  4/10/2017        Thrombocytosis ([address] ICD-10-CM: D47.3  ICD-9-CM: 238.71  3/22/2016    Overview Addendum 5/24/2018 10:44 AM by Antonio Jackson MD     Evaluation Hem-Onc Dr Justo Draper 2015: Based on the bone marrow biopsy and the lab results there is no clear reason for her thrombocytosis. There is no sign of a primary hematologic disorder. This is likely secondary thrombocytosis versus normal for her. All of the results that we have for her have been high-normal.  This makes malignancy less likely. She has seen rheumatology and there is no sign of autoimmune disorder. Imaging negative for malignancy. Lymphocytosis ICD-10-CM: G00.135  ICD-9-CM: 288.61  3/22/2016    Overview Signed 3/22/2016 10:08 AM by MD Dr Justo Rodriguez, 2015             Microhematuria ICD-10-CM: R31.29  ICD-9-CM: 599.72  12/15/2015    Overview Signed 12/15/2015  3:47 PM by Antonio Jackson MD     [phone] [facility] Dr Rashida Cortez.  KUB neg excpet small stone vs phlebolith             NAFL (nonalcoholic fatty liver) SMT-17-PP: K76.0  ICD-9-CM: 571.8  8/27/2015    Overview Signed 8/27/2015  8:45 AM by MD Dr Gilmar Rodriguez             Focal nodular hyperplasia of liver ICD-10-CM: K76.89  ICD-9-CM: 573.8  5/10/2015    Overview Signed 8/27/2015  8:45 AM by Antonio Jackson MD     GI Dr Gilmar Fisher and Onc Dr Justo Draper             Vitamin D deficiency ICD-10-CM: E55.9  ICD-9-CM: 268.9  12/13/2011    Overview Signed 12/13/2011  1:16 PM by Antonio Jackson MD     10/2011             Hypercholesterolemia with high triglycerides ICD-10-CM: E78.2  ICD-9-CM: 272.2  12/13/2011        Chronic Mechanical back pain ICD-10-CM: M54.9  ICD-9-CM: 724.5  10/25/2011        History of pyelonephritis, 1992 ICD-10-CM: Z24.142  ICD-9-CM: V13.02  10/25/2011        Allergic rhinitis ICD-10-CM: J30.9  ICD-9-CM: 477.9  10/25/2011    Overview Signed 10/25/2011  9:45 AM by Yolanda Kay MD     Worse in Spring             Cataract of right eye ICD-10-CM: H26.9  ICD-9-CM: 366.9  10/25/2011    Overview Signed 10/25/2011  9:46 AM by MD Shakeel Perez, Dr Dennise Bernheim             S/P Dog bite, 2003 ICD-10-CM: [place]. 0XXA  ICD-9-CM: E906.0  10/25/2011    Overview Signed 10/25/2011  9:46 AM by Yolanda Kay MD     [facility]             Rosacea ICD-10-CM: L71.9  ICD-9-CM: 695.3  10/25/2011        Fibrocystic breast changes ICD-10-CM: N60.19  ICD-9-CM: 610.1  10/25/2011        Raynaud's syndrome ICD-10-CM: I73.00  ICD-9-CM: 443.0  Unknown    Overview Signed 8/27/2015  8:48 AM by Yolanda Kay MD     Autoimmune workup Dr Gracie Tireney [place] 2015             PMS (premenstrual syndrome) ICD-10-CM: N94.3  ICD-9-CM: 625.4  6/21/2010        Depression ICD-10-CM: F32.9  ICD-9-CM: 899  6/21/2010        Asthma, mild intermittent ICD-10-CM: J45.909  ICD-9-CM: 493.90  6/21/2010    Overview Signed 10/25/2011  9:45 AM by Yolanda Kay MD     Since ~ 20 yo             Primary dysmenorrhea ICD-10-CM: N94.4  ICD-9-CM: 625.3  6/21/2010        Anxiety attacks ICD-10-CM: F41.0  ICD-9-CM: 300.01  6/21/2010    Overview Signed 6/21/2010  8:28 PM by Yolanda Kay MD     March 2010                       PAST SURGICAL HISTORY       Past Surgical History:   Procedure Laterality Date    EKG TREADMILL STRESS TEST  May 2010    Dr Toni Deleon and Dr Metta Kettle eval for atypical CP; dx Anxiety    EMG TWO EXTREMITIES UPPER  1999    normal    HX OTHER SURGICAL  3/2014 ordered by Dr Mccullough Render, benign    HX SHOULDER ARTHROSCOPY  1996    left shoulder    US PELV NON OBS  2004    normal    XR SPINE LUMB 2 OR 3 V  1993    normal    XR SPINE THORAC 3 V  1993    normal    XR UPPER GI SERIES W KUB  2000    normal         MEDICATIONS      Current Outpatient Medications   Medication Sig    cholecalciferol, vitamin D3, (VITAMIN D3) 2,000 unit tab Take  by mouth daily.  ascorbic acid, vitamin C, (VITAMIN C) 1,000 mg tablet Take 1,000 mg by mouth daily.  ALPRAZolam (XANAX) 0.5 mg tablet TAKE 1/2 TABLET BY MOUTH NIGHTLY AS NEEDED FOR ANXIETY OR SLEEP. MAX DAILY AMOUNT IS 0.25MG.  sertraline (ZOLOFT) 50 mg tablet TAKE 1 AND 1/2 TABLETS BY MOUTH DAILY    turmeric (CURCUMIN) Take  by mouth daily.  cranberry extract 450 mg tab tablet Take 450 mg by mouth.  levonorgestrel-ethinyl estradiol (SEASONALE) 0.15 mg-30 mcg 3MPk TAKE 1 TABLET BY MOUTH EVERY DAY    spironolactone (ALDACTONE) 50 mg tablet Take 1 Tab by mouth two (2) times a day.  VENTOLIN HFA 90 mcg/actuation inhaler INHALE 2 PUFFS BY MOUTH EVERY 6 HOURS AS NEEDED FOR WHEEZING    b complex vitamins tablet Take 1 Tab by mouth daily.  garlic cap Take  by mouth.  SLO-NIACIN PO Take 500 mg by mouth daily.  DOCOSAHEXANOIC ACID/EPA (FISH OIL PO) Take 1,000 mg by mouth daily.  loratadine (CLARITIN) 10 mg tablet Take 10 mg by mouth daily.  aspirin delayed-release 81 mg tablet Take  by mouth daily.  cyclobenzaprine (FLEXERIL) 10 mg tablet Take 10 mg by mouth as needed for Muscle Spasm(s). Takes a few x a month max     No current facility-administered medications for this visit.            ALLERGIES     Allergies   Allergen Reactions    Crestor [Rosuvastatin] Myalgia     Insomnia and muscle pain    Flonase [Fluticasone] Other (comments)     Cataracts enlarged    Hydrochlorothiazide Other (comments)     Insomnia, headache, ringing in ears, anxious    Nortriptyline Other (comments)     Increased depression    Other Medication Other (comments)     Pt is avoiding Nasal CS d/t cataracts    Sulfa (Sulfonamide Antibiotics) Hives          SOCIAL HISTORY       Social History     Socioeconomic History    Marital status: SINGLE Spouse name: Not on file    Number of children: 0    Years of education: Not on file    Highest education level: Not on file   Social Needs    Financial resource strain: Not on file    Food insecurity - worry: Not on file    Food insecurity - inability: Not on file    Transportation needs - medical: Not on file   [employer] needs - non-medical: Not on file   Occupational History    Occupation:     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    Tobacco Use    Smoking status: Former Smoker     Types: Cigarettes     Last attempt to quit: 10/25/1991     Years since quittin.1    Smokeless tobacco: Never Used    Tobacco comment: smoked lightly for about 5 months   Substance and Sexual Activity    Alcohol use:  Yes     Alcohol/week: 0.0 oz     Comment: very seldom    Drug use: No    Sexual activity: No   Other Topics Concern     Service Not Asked    Blood Transfusions Not Asked    Caffeine Concern Yes     Comment: 3 12 oz cans of diet Mountain Dews a day                        Stress Concern Not Asked     Comment: much better since changing jobs     Weight Concern Not Asked    Special Diet No    Back Care Not Asked    Exercise Yes     Comment: not as much as before, but still trying to get out and walk the dog some, plans to increase that some more    Bike Helmet Not Asked    [address] Not Asked    Self-Exams Not Asked   Social History Narrative    Not on file                IMMUNIZATIONS  Immunization History   Administered Date(s) Administered    Influenza Vaccine 2014, 10/29/2017, 10/29/2017, 10/18/2018    Meningococcal Vaccine 2002    PPD 1998, 1999    Rabies Vaccine 2003    TD Vaccine 1998, 2003    Tdap 2014         FAMILY HISTORY     Family History   Problem Relation Age of Onset    High Cholesterol Mother         high TG's in the 80's    Hypertension Mother     Thyroid Disease Mother     Other Mother         benign ovarian cysts    Anxiety Mother     Depression Mother     Diabetes Father         type 2    Cancer Father         Hodgkins    Arrhythmia Father 72        Ablation; chemo/radiation induced    Heart Surgery Father 72        stents placed    Anxiety Maternal Grandmother     Heart Disease Maternal Grandmother          80; h/o A. Fib    Stroke Maternal Grandmother     Heart Attack Maternal Grandfather          age 74    Other Paternal Grandmother          GI bleed in her early 63's    Cancer Paternal Grandfather 61        pancreatic and liver cancer,  age 61         VITALS     Visit Vitals  BP (!) 144/96 (BP 1 Location: Left arm, BP Patient Position: Sitting)   Pulse 89   Temp 97.9 °F (36.6 °C) (Oral)   Resp 16   Ht 5' 6\" (1.676 m)   Wt 215 lb (97.5 kg)   SpO2 97%   BMI 34.70 kg/m²          PHYSICAL EXAMINATION     Physical Exam   Constitutional: She is oriented to person, place, and time and well-developed, well-nourished, and in no distress. Cardiovascular: Normal rate, regular rhythm and normal heart sounds. No murmur heard. Pulmonary/Chest: Effort normal and breath sounds normal.   Neurological: She is alert and oriented to person, place, and time. Gait normal.   Skin: Skin is warm.    Psychiatric: Mood, affect and judgment normal.   Vitals reviewed.          DIAGNOSTIC DATA         LABORATORY DATA       Lab Results   Component Value Date/Time    WBC 9.1 2018 09:22 AM    HGB 14.3 2018 09:22 AM    HCT 44.1 2018 09:22 AM    PLATELET 146 (H)  09:22 AM    MCV 90.0 2018 09:22 AM     Lab Results   Component Value Date/Time    Hemoglobin A1c 5.5 2018 01:51 PM    Hemoglobin A1c 5.3 2018 10:05 AM    Hemoglobin A1c 5.6 2017 09:16 AM    Glucose 86 2018 01:51 PM    LDL, calculated 135 (H) 2018 01:51 PM Creatinine 0.79 12/21/2018 01:51 PM      Lab Results   Component Value Date/Time    Cholesterol, total 219 (H) 12/21/2018 01:51 PM    HDL Cholesterol 50 12/21/2018 01:51 PM    LDL, calculated 135 (H) 12/21/2018 01:51 PM    Triglyceride 168 (H) 12/21/2018 01:51 PM     Lab Results   Component Value Date/Time    TSH 3.610 05/24/2018 10:05 AM      Lab Results   Component Value Date/Time    Sodium 134 12/21/2018 01:51 PM    Potassium 4.9 12/21/2018 01:51 PM    Chloride 100 12/21/2018 01:51 PM    CO2 19 (L) 12/21/2018 01:51 PM    Glucose 86 12/21/2018 01:51 PM    BUN 14 12/21/2018 01:51 PM    Creatinine 0.79 12/21/2018 01:51 PM    BUN/Creatinine ratio 18 12/21/2018 01:51 PM    GFR est  12/21/2018 01:51 PM    GFR est non-AA 89 12/21/2018 01:51 PM    Calcium 9.4 12/21/2018 01:51 PM      Lab Results   Component Value Date/Time    Sodium 134 12/21/2018 01:51 PM    Potassium 4.9 12/21/2018 01:51 PM    Chloride 100 12/21/2018 01:51 PM    CO2 19 (L) 12/21/2018 01:51 PM    Glucose 86 12/21/2018 01:51 PM    BUN 14 12/21/2018 01:51 PM    Creatinine 0.79 12/21/2018 01:51 PM    BUN/Creatinine ratio 18 12/21/2018 01:51 PM    GFR est  12/21/2018 01:51 PM    GFR est non-AA 89 12/21/2018 01:51 PM    Calcium 9.4 12/21/2018 01:51 PM    Bilirubin, total 0.4 05/24/2018 10:05 AM    ALT (SGPT) 29 05/24/2018 10:05 AM    AST (SGOT) 25 05/24/2018 10:05 AM    Alk. phosphatase 65 05/24/2018 10:05 AM    Protein, total 7.1 05/24/2018 10:05 AM    Albumin 4.6 05/24/2018 10:05 AM    A-G Ratio 1.8 05/24/2018 10:05 AM      Lab Results   Component Value Date/Time    Hemoglobin A1c 5.5 12/21/2018 01:51 PM          ASSESSMENT & PLAN       ICD-10-CM ICD-9-CM    1. Benign essential hypertension I10 401.1 amLODIPine (NORVASC) 5 mg tablet   2. Hypercholesterolemia with high triglycerides E78.2 272.2    3. Prediabetes R73.03 790.29    4. Depression with anxiety F41.8 300.4    5. Anxiety attacks F41.0 300.01 ALPRAZolam (XANAX) 0.5 mg tablet   6. Insomnia secondary to depression with anxiety F51.05 300.4 ALPRAZolam (XANAX) 0.5 mg tablet    F41.8 327.02    7. Hyposomnia, insomnia or sleeplessness associated with anxiety F51.05 293.84 ALPRAZolam (XANAX) 0.5 mg tablet    F41.9 327.02    8. Encounter for medication monitoring Z51.81 V58.83 TOXASSURE SELECT 13 (MW)   9. Controlled substance agreement signed Z79.899 V58.69      Fasting lab results from 12-21-18 (copied above) reviewed with patient today, copy given  Cardiovascular risk and specific lipid/LDL/BS/HgBA1c/BP goals reviewed  Hypertension uncontrolled. Add Norvasc 5 mg daily  Continue the Aldactone, but she only takes this once daily, voiding inconvenience  Reviewed medications, effects, risks/benefits and side effects in detail  Monitor interim BP's and update me w/ readings over the next few weeks, sooner prn   pulled and reviewed. No problems noted. Low abuse/misuse potential. Patient counseled and we discussed controlled medications, effects, side effects, indications, risks vs benefits, precautions, potential interactions/dangers w/ other medications, illicit drugs, abuse potential and the possible negative health implications/risks associated w/ overuse/abuse/misuse of controlled stimulant or sedating medications including overdose and death. Patient expressed understanding and agreement with current plan of care. Controlled Substance Agreement reviewed, signed, copy filed to scan.    UDS collected today  RTC 6 months for next follow up, sooner prn

## 2018-12-31 LAB — DRUGS UR: NORMAL

## 2019-01-08 NOTE — ASSESSMENT & PLAN NOTE
Stable, based on history, physical exam and review of pertinent labs, studies and medications; meds reconciled; lifestyle modifications recommended. Key Antihyperlipidemia Meds             SLO-NIACIN PO  (Taking) Take 500 mg by mouth daily. DOCOSAHEXANOIC ACID/EPA (FISH OIL PO)  (Taking) Take 1,000 mg by mouth daily.         Lab Results  Component Value Date/Time   Cholesterol, total 229 (H) 11/29/2017 09:16 AM   HDL Cholesterol 51 11/29/2017 09:16 AM   LDL, calculated 148 (H) 11/29/2017 09:16 AM   Triglyceride 152 (H) 11/29/2017 09:16 AM None

## 2019-03-22 DIAGNOSIS — F41.0 ANXIETY ATTACK: ICD-10-CM

## 2019-03-22 DIAGNOSIS — F51.05 INSOMNIA SECONDARY TO DEPRESSION WITH ANXIETY: ICD-10-CM

## 2019-03-22 DIAGNOSIS — I10 BENIGN ESSENTIAL HYPERTENSION: ICD-10-CM

## 2019-03-22 DIAGNOSIS — F41.9 HYPOSOMNIA, INSOMNIA OR SLEEPLESSNESS ASSOCIATED WITH ANXIETY: ICD-10-CM

## 2019-03-22 DIAGNOSIS — F41.8 INSOMNIA SECONDARY TO DEPRESSION WITH ANXIETY: ICD-10-CM

## 2019-03-22 DIAGNOSIS — F51.05 HYPOSOMNIA, INSOMNIA OR SLEEPLESSNESS ASSOCIATED WITH ANXIETY: ICD-10-CM

## 2019-03-22 RX ORDER — SPIRONOLACTONE 50 MG/1
TABLET, FILM COATED ORAL
Qty: 180 TAB | Refills: 1 | Status: SHIPPED | OUTPATIENT
Start: 2019-03-22 | End: 2019-12-13 | Stop reason: SDUPTHER

## 2019-03-22 RX ORDER — ALPRAZOLAM 0.5 MG/1
TABLET ORAL
Qty: 30 TAB | Refills: 1 | Status: CANCELLED | OUTPATIENT
Start: 2019-03-22

## 2019-03-22 NOTE — TELEPHONE ENCOUNTER
No problem on , last filled 2/14/19, last seen 12/26/18, due f/u in 6 months and nothing scheduled yet. I will send a note via [employer] reminding her to get that scheduled.

## 2019-03-22 NOTE — TELEPHONE ENCOUNTER
Future Appointments   Date Time Provider Kailyn Boyd   6/26/2019  9:00 AM Anand Meehan MD [facility]   12/30/2019  8:00 AM Anand Meehan MD [facility]

## 2019-03-24 NOTE — TELEPHONE ENCOUNTER
Future Appointments   Date Time Provider Kailyn Boyd   6/26/2019  9:00 AM Sally Meehan Se, MD [facility]   12/30/2019  8:00 AM Sally Meehan Se,  [facility]

## 2019-03-24 NOTE — TELEPHONE ENCOUNTER
Patient told me she takes 1/2 tablet nightly. Back on 12-26-18 we did #30 x 3 refills. So 30 pills should be lasting her 60 days and plus she had refills on that. If she is truly taking 1/2 tablet a day she should not need refills or renewal yet. Please see what is going on. And in future, we should change her from a 0.5 mg tablet to a .25 mg tablet for clarity and consistency.

## 2019-03-25 NOTE — TELEPHONE ENCOUNTER
I sent [facility] message to pt.     [employer] message not read, I geni called and LVM on pt's cell

## 2019-04-01 NOTE — TELEPHONE ENCOUNTER
ELI Hoffman,   Yes, I am still taking 1/2 tab each night.    The bottle label said that I had no refills although Dr. Francisco Mauro had renewed it in December.  It is a [facility] issue.    Thanks,   Dallin Arias

## 2019-06-26 ENCOUNTER — OFFICE VISIT (OUTPATIENT)
Dept: FAMILY MEDICINE CLINIC | Age: 49
End: 2019-06-26

## 2019-06-26 VITALS
SYSTOLIC BLOOD PRESSURE: 138 MMHG | RESPIRATION RATE: 18 BRPM | OXYGEN SATURATION: 98 % | HEART RATE: 77 BPM | DIASTOLIC BLOOD PRESSURE: 86 MMHG | HEIGHT: 66 IN | TEMPERATURE: 97.6 F | BODY MASS INDEX: 35.77 KG/M2 | WEIGHT: 222.6 LBS

## 2019-06-26 DIAGNOSIS — K76.0 NAFL (NONALCOHOLIC FATTY LIVER): ICD-10-CM

## 2019-06-26 DIAGNOSIS — F32.A CHRONIC DEPRESSION: ICD-10-CM

## 2019-06-26 DIAGNOSIS — Z12.31 ENCOUNTER FOR SCREENING MAMMOGRAM FOR BREAST CANCER: ICD-10-CM

## 2019-06-26 DIAGNOSIS — I10 BENIGN ESSENTIAL HYPERTENSION: ICD-10-CM

## 2019-06-26 DIAGNOSIS — F41.9 HYPOSOMNIA, INSOMNIA OR SLEEPLESSNESS ASSOCIATED WITH ANXIETY: ICD-10-CM

## 2019-06-26 DIAGNOSIS — F51.05 HYPOSOMNIA, INSOMNIA OR SLEEPLESSNESS ASSOCIATED WITH ANXIETY: ICD-10-CM

## 2019-06-26 DIAGNOSIS — E55.9 VITAMIN D DEFICIENCY: ICD-10-CM

## 2019-06-26 DIAGNOSIS — Z00.00 ROUTINE GENERAL MEDICAL EXAMINATION AT A HEALTH CARE FACILITY: Primary | ICD-10-CM

## 2019-06-26 DIAGNOSIS — E78.2 HYPERCHOLESTEROLEMIA WITH HYPERTRIGLYCERIDEMIA: ICD-10-CM

## 2019-06-26 DIAGNOSIS — R73.03 PREDIABETES: ICD-10-CM

## 2019-06-26 DIAGNOSIS — F41.0 ANXIETY ATTACK: ICD-10-CM

## 2019-06-26 DIAGNOSIS — Z51.81 ENCOUNTER FOR MEDICATION MONITORING: ICD-10-CM

## 2019-06-26 DIAGNOSIS — Z23 ENCOUNTER FOR IMMUNIZATION: ICD-10-CM

## 2019-06-26 NOTE — PROGRESS NOTES
Chief Complaint   Patient presents with    Complete Physical     fasting     Hypertension     follow up    Cholesterol Problem     fasting     Anxiety     follow up anxiety/depression    Insomnia     follow up    Medication Evaluation     controlled medication check on Xanax     HISTORY OF PRESENT ILLNESS   HPI  Annual CPE, fasting follow up hypertension, hypercholesterolemia, prediabetes, labs and medication check. Admits not eating healthy or exercising lately. Wt trending up. But feels well. Stress much better since her new job last summer, she absolutely loves it. Has not checked BP's for a while now bc back when she was checking it more routinely, it was consistently running good. Takes the Norvasc once a day and the Aldactone once a day only as well. Doing well on all her meds currently. Mood and energy good Zoloft daily. Also overall feels that the Zoloft is keeping her mood stable, but she still relies on taking 1/2 tablet of Xanax qhs to help her fall asleep, stay asleep soundly. Keeps her mind from racing all night, decreases night time panic attacks and enables her to rest so that she can awaken feeling refreshed and functional for the following day. REVIEW OF SYMPTOMS   Review of Systems   Constitutional: Negative. HENT: Negative. Eyes: Negative. Respiratory: Negative. Seldom needs to use her rescue inhaler, maybe a few x a year only    Cardiovascular: Negative. Negative for chest pain and leg swelling. Gastrointestinal: Negative. Genitourinary: Negative. No complaints of abnormal vaginal discharge or bleeding, post-coital bleeding, dyspareunia, dryness, irritation or urinary complaints. No complaints of breast lumps, tenderness, mass or nipple discharge. Musculoskeletal: Negative. Neurological: Negative. Psychiatric/Behavioral: Negative for depression.            PROBLEM LIST/MEDICAL HISTORY     Problem List  Date Reviewed: 6/26/2019 Codes Class Noted    Benign essential hypertension ICD-10-CM: I10  ICD-9-CM: 401.1  2/24/2015    Overview Signed 2/24/2015 10:21 AM by Raghu Mcdonald MD     [id number]             Hyposomnia, insomnia or sleeplessness associated with anxiety ICD-10-CM: F51.05, F41.9  ICD-9-CM: 293.84, 327.02  12/26/2018        Controlled substance agreement signed ICD-10-CM: Z79.899  ICD-9-CM: V58.69  5/24/2018    Overview Addendum 6/26/2019  9:26 AM by Raghu Mcdonald MD     [id number], 1-2019             Prediabetes ICD-10-CM: R73.03  ICD-9-CM: 790.29  4/23/2017    Overview Signed 4/23/2017  9:16 PM by Raghu Mcdonald MD     Mild 4-2017             Muscle contraction headaches & Neck Muscle Spasms ICD-10-CM: L83.418  ICD-9-CM: 307.81  4/10/2017        Thrombocytosis ([address] ICD-10-CM: D47.3  ICD-9-CM: 238.71  3/22/2016    Overview Addendum 5/24/2018 10:44 AM by Raghu Mcdonald MD     Evaluation Hem-Onc Dr Johnny Goncalves 2015: Based on the bone marrow biopsy and the lab results there is no clear reason for her thrombocytosis. There is no sign of a primary hematologic disorder. This is likely secondary thrombocytosis versus normal for her. All of the results that we have for her have been high-normal.  This makes malignancy less likely. She has seen rheumatology and there is no sign of autoimmune disorder. Imaging negative for malignancy. Lymphocytosis ICD-10-CM: L45.186  ICD-9-CM: 288.61  3/22/2016    Overview Signed 3/22/2016 10:08 AM by MD Dr Johnny Baum, 2015             Microhematuria ICD-10-CM: R31.29  ICD-9-CM: 599.72  12/15/2015    Overview Signed 12/15/2015  3:47 PM by Raghu Mcdonald MD     6-[facility] Dr Garcia Guo.  KUB neg excpet small stone vs phlebolith             NAFL (nonalcoholic fatty liver) [id number]-[id number]: K76.0  ICD-9-CM: 571.8  8/27/2015    Overview Signed 8/27/2015  8:45 AM by MD Dr Gonsalo Baum             Focal nodular hyperplasia of liver ICD-10-CM: K76.89  ICD-9-CM: 573.8  5/10/2015    Overview Signed 8/27/2015  8:45 AM by Marcie Taylor MD     GI Dr Christine Watson and Onc Dr Lindsay Bay             Vitamin D deficiency ICD-10-CM: E55.9  ICD-9-CM: 268.9  12/13/2011    Overview Signed 12/13/2011  1:16 PM by Marcie Taylor MD     10/2011             Hypercholesterolemia with high triglycerides ICD-10-CM: E78.2  ICD-9-CM: 272.2  12/13/2011        Chronic Mechanical back pain ICD-10-CM: M54.9  ICD-9-CM: 724.5  10/25/2011        History of pyelonephritis, 1992 ICD-10-CM: Z87.448  ICD-9-CM: V13.02  10/25/2011        Allergic rhinitis ICD-10-CM: J30.9  ICD-9-CM: 477.9  10/25/2011    Overview Signed 10/25/2011  9:45 AM by Marcie Taylor MD     Worse in Spring             Cataract of right eye ICD-10-CM: H26.9  ICD-9-CM: 366.9  10/25/2011    Overview Signed 10/25/2011  9:46 AM by Marcie Taylor MD     Opth VEI, Dr Cecile Delgado             S/P Dog bite, 2003 ICD-10-CM: Mardee Peyton. 0XXA  ICD-9-CM: E906.0  10/25/2011    Overview Signed 10/25/2011  9:46 AM by Marcie Taylor MD     Td and Rabies [facility]             Rosacea ICD-10-CM: L71.9  ICD-9-CM: 695.3  10/25/2011        Fibrocystic breast changes ICD-10-CM: N60.19  ICD-9-CM: 610.1  10/25/2011        Raynaud's syndrome ICD-10-CM: I73.00  ICD-9-CM: 443.0  Unknown    Overview Signed 8/27/2015  8:48 AM by Marcie Taylor MD     Autoimmune workup Dr Amanda Hurst Spring 2015             PMS (premenstrual syndrome) ICD-10-CM: N94.3  ICD-9-CM: 625.4  6/21/2010        Depression ICD-10-CM: F32.9  ICD-9-CM: 927  6/21/2010        Asthma, mild intermittent ICD-10-CM: J45.909  ICD-9-CM: 493.90  6/21/2010    Overview Signed 10/25/2011  9:45 AM by Marcie Taylor MD     Since ~ 20 yo             Primary dysmenorrhea ICD-10-CM: N94.4  ICD-9-CM: 625.3  6/21/2010        Anxiety attacks ICD-10-CM: F41.0  ICD-9-CM: 300.01  6/21/2010    Overview Signed 6/21/2010  8:28 PM by Ap Gibson MD     March 2010                       PAST SURGICAL HISTORY     Past Surgical History:   Procedure Laterality Date    EKG TREADMILL STRESS TEST  May 2010    Dr Amanda Gama and Dr Damaso hodge for atypical CP; dx Anxiety    EMG TWO EXTREMITIES UPPER  1999    normal    HX OTHER SURGICAL  3/2014 ordered by Dr Sanaz Garcia, benign    HX SHOULDER ARTHROSCOPY  1996    left shoulder    US PELV NON OBS  2004    normal    XR SPINE LUMB 2 OR 3 V  1993    normal    XR SPINE THORAC 3 V  1993    normal    XR UPPER GI SERIES W KUB  2000    normal         MEDICATIONS     Current Outpatient Medications   Medication Sig    levonorgestrel-ethinyl estradiol (SEASONALE) 0.15 mg-30 mcg (91) 3MPk TAKE 1 TABLET BY MOUTH EVERY DAY    amLODIPine (NORVASC) 5 mg tablet TAKE 1 TABLET BY MOUTH EVERY DAY    spironolactone (ALDACTONE) 50 mg tablet TAKE 1 TABLET BY MOUTH TWICE A DAY    cholecalciferol, vitamin D3, (VITAMIN D3) 2,000 unit tab Take  by mouth daily.  ascorbic acid, vitamin C, (VITAMIN C) 1,000 mg tablet Take 1,000 mg by mouth daily.  ALPRAZolam (XANAX) 0.5 mg tablet TAKE 1/2 TABLET BY MOUTH NIGHTLY AS NEEDED FOR ANXIETY OR SLEEP. MAX DAILY AMOUNT IS 0.25MG.  sertraline (ZOLOFT) 50 mg tablet TAKE 1 AND 1/2 TABLETS BY MOUTH DAILY    turmeric (CURCUMIN) Take  by mouth daily.  cranberry extract 450 mg tab tablet Take 450 mg by mouth.  VENTOLIN HFA 90 mcg/actuation inhaler INHALE 2 PUFFS BY MOUTH EVERY 6 HOURS AS NEEDED FOR WHEEZING    b complex vitamins tablet Take 1 Tab by mouth daily.  garlic cap Take  by mouth.  cyclobenzaprine (FLEXERIL) 10 mg tablet Take 10 mg by mouth as needed for Muscle Spasm(s). Takes a few x a month max    SLO-NIACIN PO Take 500 mg by mouth daily.  DOCOSAHEXANOIC ACID/EPA (FISH OIL PO) Take 1,000 mg by mouth daily.  loratadine (CLARITIN) 10 mg tablet Take 10 mg by mouth daily.     aspirin delayed-release 81 mg tablet Take  by mouth daily. No current facility-administered medications for this visit. ALLERGIES     Allergies   Allergen Reactions    Crestor [Rosuvastatin] Myalgia     Insomnia and muscle pain    Flonase [Fluticasone] Other (comments)     Cataracts enlarged    Hydrochlorothiazide Other (comments)     Insomnia, headache, ringing in ears, anxious    Nortriptyline Other (comments)     Increased depression    Other Medication Other (comments)     Pt is avoiding Nasal CS d/t cataracts    Sulfa (Sulfonamide Antibiotics) Hives          SOCIAL HISTORY     Social History     Socioeconomic History    Marital status: SINGLE     Spouse name: Not on file    Number of children: 0    Years of education: Not on file    Highest education level: Not on file   Occupational History    Occupation:     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[place] [address] Occupation: Since 18: New job [employer]    Tobacco Use    Smoking status: Former Smoker     Types: Cigarettes     Last attempt to quit: 10/25/1991     Years since quittin.6    Smokeless tobacco: Never Used    Tobacco comment: smoked lightly for about 5 months   Substance and Sexual Activity    Alcohol use:  Yes     Alcohol/week: 0.0 oz     Comment: very seldom    Drug use: No    Sexual activity: Never   Other Topics Concern    Caffeine Concern Yes     Comment: 3 12 oz cans of diet Mountain Dews a day    Weight Concern Yes     Comment: wt trending upwards over the years    Special Diet No    Exercise No     Comment: not as much as before, but still trying to get out and walk the dog some, plans to increase that some more        IMMUNIZATIONS  Immunization History   Administered Date(s) Administered    Influenza Vaccine 2014, 10/29/2017, 10/29/2017, 10/18/2018    Meningococcal Vaccine 2002    PPD 1998, 1999    Rabies Vaccine 2003    TD Vaccine 1998, 2003    Tdap 2014         FAMILY HISTORY     Family History   Problem Relation Age of Onset    High Cholesterol Mother         high TG's in the 80's    Hypertension Mother     Thyroid Disease Mother     Other Mother         benign ovarian cysts    Anxiety Mother     Depression Mother     Diabetes Father         type 2    Cancer Father         Hodgkins    Arrhythmia Father 72        Ablation; chemo/radiation induced    Heart Surgery Father 72        stents placed    Anxiety Maternal Grandmother     Heart Disease Maternal Grandmother          80; h/o A. Fib    Stroke Maternal Grandmother     Heart Attack Maternal Grandfather          age 74    Other Paternal Grandmother          GI bleed in her early 63's    Cancer Paternal Grandfather 61        pancreatic and liver cancer,  age 61         VITALS     Visit Vitals  /86 (BP 1 Location: Right arm)   Pulse 77   Temp 97.6 °F (36.4 °C) (Oral)   Resp 18   Ht 5' 6\" (1.676 m)   Wt 222 lb 9.6 oz (101 kg)   SpO2 98%   BMI 35.93 kg/m²          PHYSICAL EXAMINATION   Physical Exam   Constitutional: She is oriented to person, place, and time and well-developed, well-nourished, and in no distress. HENT:   Right Ear: Tympanic membrane normal.   Left Ear: Tympanic membrane normal.   Nose: Nose normal.   Mouth/Throat: Oropharynx is clear and moist. No oropharyngeal exudate. Eyes: Pupils are equal, round, and reactive to light. Conjunctivae and EOM are normal.   Neck: Neck supple. No JVD present. Carotid bruit is not present. No thyromegaly present. Cardiovascular: Normal rate, regular rhythm and normal heart sounds. No murmur heard. Pulmonary/Chest: Effort normal and breath sounds normal. No respiratory distress. She has no wheezes. Abdominal: Soft. Bowel sounds are normal. There is no tenderness. Musculoskeletal: Normal range of motion. She exhibits no edema or tenderness.    Lymphadenopathy:     She has no cervical adenopathy. Neurological: She is alert and oriented to person, place, and time. Skin: Skin is warm. Psychiatric: Mood and affect normal.   Vitals reviewed. ASSESSMENT & PLAN       ICD-10-CM ICD-9-CM    1. Routine general medical examination at a health care facility Z00.00 V70.0 LIPID PANEL      METABOLIC PANEL, COMPREHENSIVE      HEMOGLOBIN A1C WITH EAG      TSH 3RD GENERATION      URINALYSIS W/ RFLX MICROSCOPIC      VITAMIN D, 25 HYDROXY   2. Benign essential hypertension I10 401.1    3. Hypercholesterolemia with high triglycerides E78.2 272.2 LIPID PANEL   4. Prediabetes D37.25 605.07 METABOLIC PANEL, COMPREHENSIVE      HEMOGLOBIN A1C WITH EAG   5. Vitamin D deficiency E55.9 268.9 VITAMIN D, 25 HYDROXY   6. NAFL (nonalcoholic fatty liver) Y08.4 493.2 METABOLIC PANEL, COMPREHENSIVE   7. Chronic depression F32.9 311    8. Anxiety attacks F41.0 300.01    9. Hyposomnia, insomnia or sleeplessness associated with anxiety F51.05 293.84     F41.9 327.02    10. Encounter for medication monitoring Z51.81 V58.83    11. Encounter for screening mammogram for breast cancer Z12.31 V76.12 ANGEL 3D ROCK W MAMMO BI SCREENING INCL CAD   12. Encounter for immunization/Mild Intermittent Asthma Hx, stable Z23 V03.89 PNEUMOCOCCAL POLYSACCHARIDE VACCINE, 23-VALENT, ADULT OR IMMUNOSUPPRESSED PT DOSE,      [place] IMMUNIZ ADMIN,1 SINGLE/COMB VAC/TOXOID     Fasting labs today  Cardiovascular risk and specific lipid/LDL/BP/BS/HgBa1c goals reviewed  Reviewed diet, nutrition, exercise, weight management, BMI/goals, age/risk based screening recommendations, health maintenance & prevention counseling. Cancer screening USPTFS guidelines reviewed w/ pt today. Discussed benefits/positive/negative outcomes of screening based on age/risk stratification. Informed consent for/against screening based on pt's personal hx/risk factors. Updated in history above and health maintenance.    Pap deferred to next year  Mammogram ordered  PPSV 23 given today w/o sequlae  Doing well on current medication regimen. No changes at this time. Reviewed medications and side effects    pulled and reviewed. No problems noted. Xanax last filled on 6-24-19 for #15 as she only takes 1/2 tablet nightly at this time   UDS updated   CS updated 1-2019  Low abuse/misuse potential. Patient counseled and we discussed controlled medications, effects, side effects, indications, risks vs benefits, precautions, potential interactions/dangers w/ other medications, illicit drugs, abuse potential and the possible negative health implications/risks associated w/ overuse/abuse/misuse of controlled stimulant or sedating medications including overdose and death. Patient expressed understanding and agreement with current plan of care. Further follow up & other recommendations pending review of labs.  If all remains good and stable, follow up in 6 months for medication check, sooner prn

## 2019-06-26 NOTE — PROGRESS NOTES
Chief Complaint   Patient presents with    Complete Physical     fasting        1. Have you been to the ER, urgent care clinic since your last visit? Hospitalized since your last visit? No    2. Have you seen or consulted any other health care providers outside of the [address] [place] [address] since your last visit? Include any pap smears or colon screening.  No

## 2019-06-26 NOTE — PATIENT INSTRUCTIONS

## 2019-06-27 ENCOUNTER — HOSPITAL ENCOUNTER (OUTPATIENT)
Dept: MAMMOGRAPHY | Age: 49
Discharge: HOME | End: 2019-06-27
Attending: FAMILY MEDICINE
Payer: COMMERCIAL

## 2019-06-27 DIAGNOSIS — Z12.31 ENCOUNTER FOR SCREENING MAMMOGRAM FOR BREAST CANCER: ICD-10-CM

## 2019-06-27 LAB
25(OH)D3+25(OH)D2 SERPL-MCNC: 33.9 NG/ML (ref 30–100)
ALBUMIN SERPL-MCNC: 4.5 G/DL (ref 3.5–5.5)
ALBUMIN/GLOB SERPL: 2 {RATIO} (ref 1.2–2.2)
ALP SERPL-CCNC: 68 IU/L (ref 39–117)
ALT SERPL-CCNC: 25 IU/L (ref 0–32)
APPEARANCE UR: CLEAR
AST SERPL-CCNC: 19 IU/L (ref 0–40)
BACTERIA #/AREA URNS HPF: NORMAL /[HPF]
BILIRUB SERPL-MCNC: 0.3 MG/DL (ref 0–1.2)
BILIRUB UR QL STRIP: NEGATIVE
BUN SERPL-MCNC: 13 MG/DL (ref 6–24)
BUN/CREAT SERPL: 19 (ref 9–23)
CALCIUM SERPL-MCNC: 9.1 MG/DL (ref 8.7–10.2)
CASTS URNS QL MICRO: NORMAL /LPF
CHLORIDE SERPL-SCNC: 101 MMOL/L (ref 96–106)
CHOLEST SERPL-MCNC: 210 MG/DL (ref 100–199)
CO2 SERPL-SCNC: 17 MMOL/L (ref 20–29)
COLOR UR: YELLOW
CREAT SERPL-MCNC: 0.7 MG/DL (ref 0.57–1)
EPI CELLS #/AREA URNS HPF: NORMAL /HPF (ref 0–10)
EST. AVERAGE GLUCOSE BLD GHB EST-MCNC: 111 MG/DL
GLOBULIN SER CALC-MCNC: 2.3 G/DL (ref 1.5–4.5)
GLUCOSE SERPL-MCNC: 89 MG/DL (ref 65–99)
GLUCOSE UR QL: NEGATIVE
HBA1C MFR BLD: 5.5 % (ref 4.8–5.6)
HDLC SERPL-MCNC: 45 MG/DL
HGB UR QL STRIP: ABNORMAL
INTERPRETATION, 910389: NORMAL
KETONES UR QL STRIP: NEGATIVE
LDLC SERPL CALC-MCNC: 114 MG/DL (ref 0–99)
LEUKOCYTE ESTERASE UR QL STRIP: ABNORMAL
MICRO URNS: ABNORMAL
MUCOUS THREADS URNS QL MICRO: PRESENT
NITRITE UR QL STRIP: NEGATIVE
PH UR STRIP: 6.5 [PH] (ref 5–7.5)
POTASSIUM SERPL-SCNC: 4.3 MMOL/L (ref 3.5–5.2)
PROT SERPL-MCNC: 6.8 G/DL (ref 6–8.5)
PROT UR QL STRIP: NEGATIVE
RBC #/AREA URNS HPF: NORMAL /HPF (ref 0–2)
SODIUM SERPL-SCNC: 135 MMOL/L (ref 134–144)
SP GR UR: 1.01 (ref 1–1.03)
TRIGL SERPL-MCNC: 254 MG/DL (ref 0–149)
TSH SERPL DL<=0.005 MIU/L-ACNC: 3.18 UIU/ML (ref 0.45–4.5)
UROBILINOGEN UR STRIP-MCNC: 0.2 MG/DL (ref 0.2–1)
VLDLC SERPL CALC-MCNC: 51 MG/DL (ref 5–40)
WBC #/AREA URNS HPF: NORMAL /HPF (ref 0–5)

## 2019-06-27 PROCEDURE — 77063 BREAST TOMOSYNTHESIS BI: CPT

## 2019-07-03 ENCOUNTER — TELEPHONE (OUTPATIENT)
Dept: FAMILY MEDICINE CLINIC | Age: 49
End: 2019-07-03

## 2019-07-03 NOTE — TELEPHONE ENCOUNTER
Patient is calling wanting to know can Dr. Prince Holbrook call in prescription in for a UTI. Pt states that she usually gets one around this time of the year. Symptoms: pain, burning in vaginal area. Best callback:  [phone]      . Pharmacy on file verified      LOV:  Wednesday, June 26, 2019   UOV:  Monday, December 30, 2019

## 2019-07-05 NOTE — TELEPHONE ENCOUNTER
Outgoing call to patient, GIANNA verified. Informed that she would need office visit before anything is prescribed and offered to schedule her today or tomorrow, patient refused. I told her I would check and see if you will refill before she leaves, but explained you're out of office and may not get to it, patient understood and stated she will go to patient first if she needs.

## 2019-07-13 ENCOUNTER — PATIENT MESSAGE (OUTPATIENT)
Dept: FAMILY MEDICINE CLINIC | Age: 49
End: 2019-07-13

## 2019-07-23 DIAGNOSIS — F51.05 INSOMNIA SECONDARY TO DEPRESSION WITH ANXIETY: ICD-10-CM

## 2019-07-23 DIAGNOSIS — F41.8 INSOMNIA SECONDARY TO DEPRESSION WITH ANXIETY: ICD-10-CM

## 2019-07-23 DIAGNOSIS — F41.9 HYPOSOMNIA, INSOMNIA OR SLEEPLESSNESS ASSOCIATED WITH ANXIETY: ICD-10-CM

## 2019-07-23 DIAGNOSIS — F41.0 ANXIETY ATTACK: ICD-10-CM

## 2019-07-23 DIAGNOSIS — F51.05 HYPOSOMNIA, INSOMNIA OR SLEEPLESSNESS ASSOCIATED WITH ANXIETY: ICD-10-CM

## 2019-07-23 RX ORDER — ALPRAZOLAM 0.5 MG/1
TABLET ORAL
Qty: 30 TAB | Refills: 3 | Status: CANCELLED | OUTPATIENT
Start: 2019-07-23

## 2019-07-23 RX ORDER — ALPRAZOLAM 0.5 MG/1
TABLET ORAL
Qty: 15 TAB | Refills: 5 | OUTPATIENT
Start: 2019-07-23 | End: 2020-01-20

## 2019-11-29 ENCOUNTER — TELEPHONE (OUTPATIENT)
Dept: FAMILY MEDICINE CLINIC | Age: 49
End: 2019-11-29

## 2019-11-30 ENCOUNTER — TELEPHONE (OUTPATIENT)
Dept: FAMILY MEDICINE CLINIC | Age: 49
End: 2019-11-30

## 2019-11-30 DIAGNOSIS — G47.10 HYPERSOMNOLENCE: Primary | ICD-10-CM

## 2019-11-30 NOTE — TELEPHONE ENCOUNTER
----- Message from Damaso Munoz LPN sent at 21/74/2843 11:07 AM EST -----  Regarding: FW: Non-Urgent Medical Question  Contact: [phone]    ----- Message -----  From: Lemuel Luis  Sent: 11/29/2019  10:50 AM EST  To: [facility] Nurse [place]  Subject: Non-Urgent Medical Question                      Dear ,  I am ready to do a sleep study that you recommended a while ago. I am super tired all of the time. Where do you recommend that I go, and if I need a referral, could that be completed for me?   Thank you so much,  Jason [place]

## 2019-12-01 NOTE — TELEPHONE ENCOUNTER
----- Message from Daria Sarabia LPN sent at 01/30/0221 11:07 AM EST -----  Regarding: FW: Non-Urgent Medical Question  Contact: [phone]    ----- Message -----  From: Estefani Moss  Sent: 11/29/2019  10:50 AM EST  To: [facility] Nurse [place]  Subject: Non-Urgent Medical Question                      Dear ,  I am ready to do a sleep study that you recommended a while ago. I am super tired all of the time. Where do you recommend that I go, and if I need a referral, could that be completed for me?   Thank you so much,  Suresh Im

## 2019-12-01 NOTE — TELEPHONE ENCOUNTER
You can advise pt I have placed the order for the referral. Arcelia Brandon can let her know how this works as far as scheduling and can give her their number in case she has not heard back in a week.  But that at least the order is in the system and they typically will be in touch w/in the week so be on the look out and call back if needed

## 2019-12-14 RX ORDER — ALBUTEROL SULFATE 90 UG/1
2 AEROSOL, METERED RESPIRATORY (INHALATION)
Qty: 18 INHALER | Refills: 1 | Status: SHIPPED | OUTPATIENT
Start: 2019-12-14 | End: 2022-07-06 | Stop reason: SDUPTHER

## 2019-12-14 NOTE — TELEPHONE ENCOUNTER
Chief Complaint   Patient presents with    Medication Refill    Medication Refill     Albuterol ( Ventolin HFA) 90 mcg/actuation inhaler     Patient request via [employer]. Patient last office visit 6/26/19, next appointment 12/30/19.   Ely Sy LPN

## 2019-12-14 NOTE — TELEPHONE ENCOUNTER
Future Appointments:         Future Appointments   Date Time Provider Kaliyn Boyd   12/30/2019  8:00 AM Arti Allan MD

## 2019-12-30 ENCOUNTER — OFFICE VISIT (OUTPATIENT)
Dept: FAMILY MEDICINE CLINIC | Age: 49
End: 2019-12-30

## 2019-12-30 VITALS
OXYGEN SATURATION: 98 % | BODY MASS INDEX: 36 KG/M2 | SYSTOLIC BLOOD PRESSURE: 120 MMHG | TEMPERATURE: 98.3 F | WEIGHT: 224 LBS | RESPIRATION RATE: 16 BRPM | DIASTOLIC BLOOD PRESSURE: 84 MMHG | HEART RATE: 68 BPM | HEIGHT: 66 IN

## 2019-12-30 DIAGNOSIS — F51.05 HYPOSOMNIA, INSOMNIA OR SLEEPLESSNESS ASSOCIATED WITH ANXIETY: ICD-10-CM

## 2019-12-30 DIAGNOSIS — F41.0 ANXIETY ATTACK: ICD-10-CM

## 2019-12-30 DIAGNOSIS — Z51.81 ENCOUNTER FOR MEDICATION MONITORING: ICD-10-CM

## 2019-12-30 DIAGNOSIS — F41.9 HYPOSOMNIA, INSOMNIA OR SLEEPLESSNESS ASSOCIATED WITH ANXIETY: ICD-10-CM

## 2019-12-30 DIAGNOSIS — F32.A ANXIETY AND DEPRESSION: Primary | ICD-10-CM

## 2019-12-30 DIAGNOSIS — F41.9 ANXIETY AND DEPRESSION: Primary | ICD-10-CM

## 2019-12-30 DIAGNOSIS — J45.40 MODERATE PERSISTENT ASTHMA WITHOUT COMPLICATION: ICD-10-CM

## 2019-12-30 RX ORDER — FLUTICASONE PROPIONATE AND SALMETEROL 100; 50 UG/1; UG/1
1 POWDER RESPIRATORY (INHALATION) EVERY 12 HOURS
Qty: 1 INHALER | Refills: 3 | Status: SHIPPED | OUTPATIENT
Start: 2019-12-30 | End: 2020-03-12 | Stop reason: SDUPTHER

## 2019-12-30 NOTE — PROGRESS NOTES
Chief Complaint   Patient presents with    Hypertension     follow up    Medication Refill     taking Xanax.  reviewed. Last refill 12/23/19   Due for Urine Drug Screen. Last CSA 1/4/19              \"REVIEWED RECORD IN PREPARATION FOR VISIT AND HAVE OBTAINED THE NECESSARY DOCUMENTATION\"  1. Have you been to the ER, urgent care clinic since your last visit? Hospitalized since your last visit? No    2. Have you seen or consulted any other health care providers outside of the [address] [place] [address] since your last visit? Include any pap smears or colon screening.  No

## 2019-12-30 NOTE — PATIENT INSTRUCTIONS
Learning About Asthma Triggers  What are asthma triggers? When you have asthma, certain things can make your symptoms worse. These are called triggers. Learn what triggers an asthma attack for you, and avoid the triggers when you can. Common triggers include colds, smoke, air pollution, dust, pollen, pets, stress, and cold air. How do asthma triggers affect you? Triggers can make it harder for your lungs to work as they should. They can lead to sudden breathing problems and other symptoms. When you are around a trigger, an asthma attack is more likely. If your symptoms are severe, you may need emergency treatment or have to go to the hospital for treatment. What can you do to avoid triggers? The first thing is to know your triggers. When you are having symptoms, note the things around you that might be causing them. Then look for patterns that may be triggering your symptoms. Record your triggers on a piece of paper or in an asthma diary. When you have your list of possible triggers, work with your doctor to find ways to avoid them. Avoid colds and flu. Get a pneumococcal vaccine shot. If you have had one before, ask your doctor whether you need a second dose. Get a flu vaccine every year, as soon as it's available. If you must be around people with colds or the flu, wash your hands often. Here are some ways to avoid a few common triggers. · Do not smoke or allow others to smoke around you. If you need help quitting, talk to your doctor about stop-smoking programs and medicines. These can increase your chances of quitting for good. · If there is a lot of pollution, pollen, or dust outside, stay at home and keep your windows closed. Use an air conditioner or air filter in your home. Check your local weather report or newspaper for air quality and pollen reports. What else should you know? · Take your controller medicine every day, not just when you have symptoms.  It helps prevent problems before they occur. · Your doctor may suggest that you check how well your lungs are working by measuring your peak expiratory flow (PEF) throughout the day. Your PEF may drop when you are near things that trigger symptoms. Where can you learn more? Go to http://margaret-dano.info/. Enter L231 in the search box to learn more about \"Learning About Asthma Triggers. \"  Current as of: June 9, 2019  Content Version: 12.2  © 4393-1153 [email or web address]. Care instructions adapted under license by [employer] (which disclaims liability or warranty for this information). If you have questions about a medical condition or this instruction, always ask your healthcare professional. [place] [address] any warranty or liability for your use of this information. Anxiety Disorder: Care Instructions  Your Care Instructions    Anxiety is a normal reaction to stress. Difficult situations can cause you to have symptoms such as sweaty palms and a nervous feeling. In an anxiety disorder, the symptoms are far more severe. Constant worry, muscle tension, trouble sleeping, nausea and diarrhea, and other symptoms can make normal daily activities difficult or impossible. These symptoms may occur for no reason, and they can affect your work, school, or social life. Medicines, counseling, and self-care can all help. Follow-up care is a key part of your treatment and safety. Be sure to make and go to all appointments, and call your doctor if you are having problems. It's also a good idea to know your test results and keep a list of the medicines you take. How can you care for yourself at home? · Take medicines exactly as directed. Call your doctor if you think you are having a problem with your medicine. · Go to your counseling sessions and follow-up appointments. · Recognize and accept your anxiety.  Then, when you are in a situation that makes you anxious, say to yourself, \"This is not an emergency. I feel uncomfortable, but I am not in danger. I can keep going even if I feel anxious. \"  · Be kind to your body:  ? Relieve tension with exercise or a massage. ? Get enough rest.  ? Avoid alcohol, caffeine, nicotine, and illegal drugs. They can increase your anxiety level and cause sleep problems. ? Learn and do relaxation techniques. See below for more about these techniques. · Engage your mind. Get out and do something you enjoy. Go to a funny movie, or take a walk or hike. Plan your day. Having too much or too little to do can make you anxious. · Keep a record of your symptoms. Discuss your fears with a good friend or family member, or join a support group for people with similar problems. Talking to others sometimes relieves stress. · Get involved in social groups, or volunteer to help others. Being alone sometimes makes things seem worse than they are. · Get at least 30 minutes of exercise on most days of the week to relieve stress. Walking is a good choice. You also may want to do other activities, such as running, swimming, cycling, or playing tennis or team sports. Relaxation techniques  Do relaxation exercises 10 to 20 minutes a day. You can play soothing, relaxing music while you do them, if you wish. · Tell others in your house that you are going to do your relaxation exercises. Ask them not to disturb you. · Find a comfortable place, away from all distractions and noise. · Lie down on your back, or sit with your back straight. · Focus on your breathing. Make it slow and steady. · Breathe in through your nose. Breathe out through either your nose or mouth. · Breathe deeply, filling up the area between your navel and your rib cage. Breathe so that your belly goes up and down. · Do not hold your breath. · Breathe like this for 5 to 10 minutes. Notice the feeling of calmness throughout your whole body.   As you continue to breathe slowly and deeply, relax by doing the following for another 5 to 10 minutes:  · Tighten and relax each muscle group in your body. You can begin at your toes and work your way up to your head. · Imagine your muscle groups relaxing and becoming heavy. · Empty your mind of all thoughts. · Let yourself relax more and more deeply. · Become aware of the state of calmness that surrounds you. · When your relaxation time is over, you can bring yourself back to alertness by moving your fingers and toes and then your hands and feet and then stretching and moving your entire body. Sometimes people fall asleep during relaxation, but they usually wake up shortly afterward. · Always give yourself time to return to full alertness before you drive a car or do anything that might cause an accident if you are not fully alert. Never play a relaxation tape while you drive a car. When should you call for help? Call 911 anytime you think you may need emergency care. For example, call if:    · You feel you cannot stop from hurting yourself or someone else.   [place] the numbers for these national suicide hotlines: 3-461-183-TALK (3-474-837-8610) and 0-594-ERSLSJT (9-464.137.6520). If you or someone you know talks about suicide or feeling hopeless, get help right away.   Watch closely for changes in your health, and be sure to contact your doctor if:    · You have anxiety or fear that affects your life.     · You have symptoms of anxiety that are new or different from those you had before. Where can you learn more? Go to http://margaret-dano.info/. Enter P754 in the search box to learn more about \"Anxiety Disorder: Care Instructions. \"  Current as of: May 28, 2019  Content Version: 12.2  © 0604-9611 [employer]. Care instructions adapted under license by [employer] (which disclaims liability or warranty for this information).  If you have questions about a medical condition or this instruction, always ask your healthcare professional. [address] any warranty or liability for your use of this information.

## 2019-12-30 NOTE — PROGRESS NOTES
Chief Complaint   Patient presents with    Anxiety     follow up    Medication Evaluation     controlled med check on Xanax.  reviewed. Last refill 12/23/19   Due for Urine Drug Screen. Last CSA 1/4/19    Asthma     check       HISTORY OF PRESENT ILLNESS   HPI  6 month follow up depression, anxiety, medication check on Zoloft and controlled medication check on Xanax. Doing well on all her meds currently. Mood and energy good Zoloft daily. Also overall feels that the Zoloft is keeping her mood stable, but she still relies on taking 1/2 tablet of Xanax qhs to help her fall asleep, stay asleep soundly. Keeps her mind from racing all night, decreases night time panic attacks and enables her to rest so that she can awaken feeling refreshed and functional for the following day. Asthma  Patient w/ h/o mild intermittent and seasonal asthma. Typically only needs to use her inhaler a few x a year or a few x a month at most.  Since a URI she got ~ 1 month ago, she has been needing to use her rescue inhaler more often. About 4 weeks ago she was having cold symptoms and chest tightness w/ occasional restricted breathing. She used Mucinex which helped a lot and during that time she used her Albuterol Inhaler bid for about a week. The URI sx have resolved but she still has been having some occasional sensation of chest tightness, restricted breathing, CANNON, occ dry cough and occ slight wheezing. She has not used her Albuterol since 12-17-19 but feels as though she could use it every day. She did use Advair in the past when her asthma was uncontrolled. Otherwise not feeling bad or ill. Still staying active and no interference w/ sleep or day to day activities. Her father was just diagnosed w/ lung cancer and she wanted to make sure she didn't need to have a CXR. REVIEW OF SYMPTOMS   Review of Systems   Constitutional: Negative for chills, diaphoresis, fever, malaise/fatigue and weight loss.    HENT: Negative. Respiratory: Negative for hemoptysis and sputum production. Cardiovascular: Negative. Negative for chest pain. Gastrointestinal: Negative. Neurological: Negative. Psychiatric/Behavioral: Negative for depression. PROBLEM LIST/MEDICAL HISTORY     Problem List  Date Reviewed: 6/26/2019          Codes Class Noted    Benign essential hypertension ICD-10-CM: I10  ICD-9-CM: 401.1  2/24/2015    Overview Signed 2/24/2015 10:21 AM by Shanelle Hathaway MD     [id number]             Hyposomnia, insomnia or sleeplessness associated with anxiety ICD-10-CM: F51.05, F41.9  ICD-9-CM: 293.84, 327.02  12/26/2018        Controlled substance agreement signed ICD-10-CM: Z79.899  ICD-9-CM: V58.69  5/24/2018    Overview Addendum 6/26/2019  9:26 AM by Shanelle Hathaway MD     [birth year], 1-2019             Prediabetes ICD-10-CM: R73.03  ICD-9-CM: 790.29  4/23/2017    Overview Signed 4/23/2017  9:16 PM by Shanelle Hathaway MD     Mild 4-2017             Muscle contraction headaches & Neck Muscle Spasms ICD-10-CM: G44.209  ICD-9-CM: 307.81  4/10/2017        Thrombocytosis ([facility] [address] ICD-10-CM: D47.3  ICD-9-CM: 238.71  3/22/2016    Overview Addendum 5/24/2018 10:44 AM by Shanelle Hathaway MD     Evaluation Hem-Onc Dr Ciara Maria 2015: Based on the bone marrow biopsy and the lab results there is no clear reason for her thrombocytosis. There is no sign of a primary hematologic disorder. This is likely secondary thrombocytosis versus normal for her. All of the results that we have for her have been high-normal.  This makes malignancy less likely. She has seen rheumatology and there is no sign of autoimmune disorder. Imaging negative for malignancy.              Lymphocytosis ICD-10-CM: D23.812  ICD-9-CM: 288.61  3/22/2016    Overview Signed 3/22/2016 10:08 AM by MD Dr Ciara Keen, 2015             Microhematuria ICD-10-CM: R31.29  ICD-9-CM: 970.23  12/15/2015    Overview Signed 12/15/2015  3:47 PM by Erlinda Bach MD     [address] [facility] Dr Nadine Bustos. KUB neg excpet small stone vs phlebolith             NAFL (nonalcoholic fatty liver) [id number]-[id number]: K76.0  ICD-9-CM: 571.8  8/27/2015    Overview Signed 8/27/2015  8:45 AM by MD Dr Felipe Dixon             Focal nodular hyperplasia of liver ICD-10-CM: K76.89  ICD-9-CM: 573.8  5/10/2015    Overview Signed 8/27/2015  8:45 AM by Erlinda Bach MD     GI Dr Felipe Lopez and Onc Dr Jaelyn Gamez             Vitamin D deficiency ICD-10-CM: E55.9  ICD-9-CM: 268.9  12/13/2011    Overview Signed 12/13/2011  1:16 PM by Erlinda Bach MD     10/2011             Hypercholesterolemia with high triglycerides ICD-10-CM: E78.2  ICD-9-CM: 272.2  12/13/2011        Chronic Mechanical back pain ICD-10-CM: M54.9  ICD-9-CM: 724.5  10/25/2011        History of pyelonephritis, 1992 ICD-10-CM: Z87.448  ICD-9-CM: V13.02  10/25/2011        Allergic rhinitis ICD-10-CM: J30.9  ICD-9-CM: 477.9  10/25/2011    Overview Signed 10/25/2011  9:45 AM by Erlinda Bach MD     Worse in Spring             Cataract of right eye ICD-10-CM: H26.9  ICD-9-CM: 366.9  10/25/2011    Overview Signed 10/25/2011  9:46 AM by Erlinda Bach MD     Opth VEI, Dr Kimberly Thurston             S/P Dog bite, 2003 ICD-10-CM: W54. 0XXA  ICD-9-CM: E906.0  10/25/2011    Overview Signed 10/25/2011  9:46 AM by Erlinda Bach MD     Td and Rabies [facility]             Rosacea ICD-10-CM: L71.9  ICD-9-CM: 695.3  10/25/2011        Fibrocystic breast changes ICD-10-CM: N60.19  ICD-9-CM: 610.1  10/25/2011        Raynaud's syndrome ICD-10-CM: I73.00  ICD-9-CM: 443.0  Unknown    Overview Signed 8/27/2015  8:48 AM by Erlinda Bach MD     Autoimmune workup Dr Anjana Rocha [place] 2015             PMS (premenstrual syndrome) ICD-10-CM: N94.3  ICD-9-CM: 625.4  6/21/2010        Depression ICD-10-CM: F32.9  ICD-9-CM: 131  6/21/2010        Asthma, mild intermittent ICD-10-CM: J45.909  ICD-9-CM: 493.90  6/21/2010    Overview Signed 10/25/2011  9:45 AM by Payam García MD     Since ~ 20 yo             Primary dysmenorrhea ICD-10-CM: N94.4  ICD-9-CM: 625.3  6/21/2010        Anxiety attacks ICD-10-CM: F41.0  ICD-9-CM: 300.01  6/21/2010    Overview Signed 6/21/2010  8:28 PM by Payam García MD     March 2010                       PAST SURGICAL HISTORY     Past Surgical History:   Procedure Laterality Date    EKG TREADMILL STRESS TEST  May 2010    Dr Cayetano Baum and Dr Charleen hodge for atypical CP; dx Anxiety    EMG TWO EXTREMITIES UPPER  1999    normal    HX OTHER SURGICAL  3/2014 ordered by Dr Jane Galeas, benign    HX SHOULDER ARTHROSCOPY  1996    left shoulder    US PELV NON OBS  2004    normal    XR SPINE LUMB 2 OR 3 V  1993    normal    XR SPINE THORAC 3 V  1993    normal    XR UPPER GI SERIES W KUB  2000    normal         MEDICATIONS     Current Outpatient Medications   Medication Sig    fluticasone propionate (FLONASE NA) by Nasal route daily.  albuterol (VENTOLIN HFA) 90 mcg/actuation inhaler Take 2 Puffs by inhalation every six (6) hours as needed for Wheezing.  spironolactone (ALDACTONE) 50 mg tablet TAKE 1 TABLET BY MOUTH TWICE A DAY    amLODIPine (NORVASC) 5 mg tablet TAKE 1 TABLET BY MOUTH EVERY DAY    sertraline (ZOLOFT) 50 mg tablet TAKE 1 AND 1/2 TABLETS BY MOUTH DAILY    ALPRAZolam (XANAX) 0.5 mg tablet TAKE 1/2 TAB BY MOUTH EVERY EVENING AS NEEDED FOR ANXIETY OR SLEEP    levonorgestrel-ethinyl estradiol (SEASONALE) 0.15 mg-30 mcg (91) 3MPk TAKE 1 TABLET BY MOUTH EVERY DAY    cholecalciferol, vitamin D3, (VITAMIN D3) 2,000 unit tab Take  by mouth daily.  ascorbic acid, vitamin C, (VITAMIN C) 1,000 mg tablet Take 1,000 mg by mouth daily.  turmeric (CURCUMIN) Take  by mouth daily.  cranberry extract 450 mg tab tablet Take 450 mg by mouth.  b complex vitamins tablet Take 1 Tab by mouth daily.     garlic cap Take  by mouth.  cyclobenzaprine (FLEXERIL) 10 mg tablet Take 10 mg by mouth as needed for Muscle Spasm(s). Takes a few x a month max    SLO-NIACIN PO Take 500 mg by mouth daily.  loratadine (CLARITIN) 10 mg tablet Take 10 mg by mouth daily.  aspirin delayed-release 81 mg tablet Take  by mouth daily. No current facility-administered medications for this visit. ALLERGIES     Allergies   Allergen Reactions    Crestor [Rosuvastatin] Myalgia     Insomnia and muscle pain    Flonase [Fluticasone] Other (comments)     Cataracts enlarged    Hydrochlorothiazide Other (comments)     Insomnia, headache, ringing in ears, anxious    Nortriptyline Other (comments)     Increased depression    Other Medication Other (comments)     Pt is avoiding Nasal CS d/t cataracts    Sulfa (Sulfonamide Antibiotics) Hives          SOCIAL HISTORY     Social History     Socioeconomic History    Marital status: SINGLE     Spouse name: Not on file    Number of children: 0    Years of education: Not on file    Highest education level: Not on file   Occupational History    Occupation:     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[place] [address] Occupation: Since 18: New job [employer]    Tobacco Use    Smoking status: Former Smoker     Types: Cigarettes     Last attempt to quit: 10/25/1991     Years since quittin.2    Smokeless tobacco: Never Used    Tobacco comment: smoked lightly for about 5 months   Substance and Sexual Activity    Alcohol use:  Yes     Alcohol/week: 0.0 standard drinks     Comment: very seldom    Drug use: No    Sexual activity: Never   Other Topics Concern    Caffeine Concern Yes     Comment: 3 12 oz cans of diet Mountain Dews a day    Weight Concern Yes     Comment: wt trending upwards over the years    Special Diet No    Exercise No     Comment: not as much as before, but still trying to get out and walk the dog some, plans to increase that some more        IMMUNIZATIONS  Immunization History   Administered Date(s) Administered    Influenza Vaccine 2014, 10/29/2017, 10/29/2017, 10/18/2018    Meningococcal Vaccine 2002    PPD 1998, 1999    Pneumococcal Polysaccharide (PPSV-23) 2019    Rabies Vaccine 2003    TD Vaccine 1998, 2003    Tdap 2014         FAMILY HISTORY     Family History   Problem Relation Age of Onset    High Cholesterol Mother         high TG's in the 80's    Hypertension Mother     Thyroid Disease Mother     Other Mother         benign ovarian cysts    Anxiety Mother     Depression Mother     Diabetes Father         type 2    Cancer Father         Hodgkins    Arrhythmia Father 72        Ablation; chemo/radiation induced    Heart Surgery Father 72        stents placed    Anxiety Maternal Grandmother     Heart Disease Maternal Grandmother          80; h/o A. Fib    Stroke Maternal Grandmother     Heart Attack Maternal Grandfather          age 74    Other Paternal Grandmother          GI bleed in her early 57's    Cancer Paternal Grandfather 61        pancreatic and liver cancer,  age 61         VITALS     Visit Vitals  /84 (BP 1 Location: Left arm, BP Patient Position: Sitting)   Pulse 68   Temp 98.3 °F (36.8 °C) (Oral)   Resp 16   Ht 5' 6\" (1.676 m)   Wt 224 lb (101.6 kg)   SpO2 98%   BMI 36.15 kg/m²          PHYSICAL EXAMINATION   Physical Exam  Vitals signs reviewed. Constitutional:       General: She is not in acute distress. Appearance: Normal appearance. She is not ill-appearing or diaphoretic. HENT:      Right Ear: Tympanic membrane normal.      Left Ear: Tympanic membrane normal.      Nose: Nose normal.      Mouth/Throat:      Mouth: Mucous membranes are moist.      Pharynx: Oropharynx is clear. No oropharyngeal exudate. Neck:      Musculoskeletal: Neck supple. No muscular tenderness. Cardiovascular:      Rate and Rhythm: Normal rate and regular rhythm. Pulses: Normal pulses. Heart sounds: Normal heart sounds. No murmur. No gallop. Pulmonary:      Effort: Pulmonary effort is normal. No respiratory distress. Breath sounds: Normal breath sounds. No wheezing or rales. Lymphadenopathy:      Cervical: No cervical adenopathy. Skin:     General: Skin is warm and dry. Neurological:      General: No focal deficit present. Mental Status: She is oriented to person, place, and time. Cranial Nerves: No cranial nerve deficit. Psychiatric:         Mood and Affect: Mood normal.         Behavior: Behavior normal.         Thought Content: Thought content normal.         Judgment: Judgment normal.             DIAGNOSTIC DATA         LABORATORY DATA     Results for orders placed or performed in visit on 06/26/19   LIPID PANEL   Result Value Ref Range    Cholesterol, total 210 (H) 100 - 199 mg/dL    Triglyceride 254 (H) 0 - 149 mg/dL    HDL Cholesterol 45 >39 mg/dL    VLDL, calculated 51 (H) 5 - 40 mg/dL    LDL, calculated 114 (H) 0 - 99 mg/dL   METABOLIC PANEL, COMPREHENSIVE   Result Value Ref Range    Glucose 89 65 - 99 mg/dL    BUN 13 6 - 24 mg/dL    Creatinine 0.70 0.57 - 1.00 mg/dL    GFR est non- >59 mL/min/1.73    GFR est  >59 mL/min/1.73    BUN/Creatinine ratio 19 9 - 23    Sodium 135 134 - 144 mmol/L    Potassium 4.3 3.5 - 5.2 mmol/L    Chloride 101 96 - 106 mmol/L    CO2 17 (L) 20 - 29 mmol/L    Calcium 9.1 8.7 - 10.2 mg/dL    Protein, total 6.8 6.0 - 8.5 g/dL    Albumin 4.5 3.5 - 5.5 g/dL    GLOBULIN, TOTAL 2.3 1.5 - 4.5 g/dL    A-G Ratio 2.0 1.2 - 2.2    Bilirubin, total 0.3 0.0 - 1.2 mg/dL    Alk.  phosphatase 68 39 - 117 IU/L    AST (SGOT) 19 0 - 40 IU/L    ALT (SGPT) 25 0 - 32 IU/L   HEMOGLOBIN A1C WITH EAG   Result Value Ref Range    Hemoglobin A1c 5.5 4.8 - 5.6 %    Estimated average glucose 111 mg/dL   TSH 3RD GENERATION   Result Value Ref Range TSH 3.180 0.450 - 4.500 uIU/mL   URINALYSIS W/ RFLX MICROSCOPIC   Result Value Ref Range    Specific Gravity 1.008 1.005 - 1.030    pH (UA) 6.5 5.0 - 7.5    Color Yellow Yellow    Appearance Clear Clear    Leukocyte Esterase 2+ (A) Negative    Protein Negative Negative/Trace    Glucose Negative Negative    Ketone Negative Negative    Blood Trace (A) Negative    Bilirubin Negative Negative    Urobilinogen 0.2 0.2 - 1.0 mg/dL    Nitrites Negative Negative    Microscopic Examination See additional order    VITAMIN D, 25 HYDROXY   Result Value Ref Range    VITAMIN D, 25-HYDROXY 33.9 30.0 - 100.0 ng/mL   MICROSCOPIC EXAMINATION   Result Value Ref Range    WBC 0-5 0 - 5 /hpf    RBC 0-2 0 - 2 /hpf    Epithelial cells None seen 0 - 10 /hpf    Casts None seen None seen /lpf    Mucus Present Not Estab. Bacteria Few None seen/Few   CVD REPORT   Result Value Ref Range    INTERPRETATION Note             ASSESSMENT & PLAN       ICD-10-CM ICD-9-CM    1. Anxiety and depression F41.9 300.00     F32.9 311    2. Anxiety attacks F41.0 300.01    3. Hyposomnia, insomnia or sleeplessness associated with anxiety F51.05 293.84     F41.9 327.02    4. Encounter for medication monitoring Z51.81 V58.83 TOXASSURE SELECT 13 (MW)   5. Moderate persistent asthma without complication K20.37 350.81 fluticasone propion-salmeterol (ADVAIR/WIXELA) 100-50 mcg/dose diskus inhaler     Anxiety, depression, sleep maintenance currently stable on regimen of Zoloft and Xanax. No changes in therapy at this time. UDS updated today. CSA on file w/in the past year.  pulled and reviewed. No problems noted. Xanax last filled 12-23-19 #15.   Low abuse/misuse potential.   Patient counseled and we discussed controlled medications, effects, side effects, indications, risks vs benefits, precautions, potential interactions/dangers w/ other medications, illicit drugs, abuse potential and the possible negative health implications/risks associated w/ overuse/abuse/misuse of controlled stimulant or sedating medications including overdose and death. Patient expressed understanding and agreement with current plan of care. (Additional discussion to women that these medications are not to be used during pregnancy due to potential fetal/maternal harm and risk of pregnancy complications. For women of childbearing age, the importance of adhering to appropriate contraceptive measures was discussed to prevent these potential negative health implications, risks to fetus, and pregnancy complications.)  Asthma counseling. Advair 100-50 bid starting now and seasonally. Reviewed medications, effects, risks/benefits, precautions, and potential side effects in detail. Mouthwash rinses after each usage. Follow up if not improved, sooner if anything worsens in the interim. ER for any acute changes or concerns. RTC 6 months for fasting CPE and follow up medication check.

## 2019-12-30 NOTE — LETTER
Alexia Smiley FDN:[birth year] MR #:[id number] Lora [address] Page 1 of 5 CONTROLLED SUBSTANCE AGREEMENT I may be prescribed medications that are controlled substances as part  of my treatment plan for management of my medical condition(s). The goal of my treatment plan is to maintain and/or improve my health and wellbeing. Because controlled substances have an increased risk of abuse or harm, continual re-evaluation is needed determine if the goals of my treatment plan are being met for my safety and the safety of others. Santi Friedman  am entering into this Controlled Substance Agreement with my provider, __________________________________ at [facility] . I understand that successful treatment requires mutual trust and honesty between me and my provider. I understand that there are state and federal laws and regulations which apply to the medications that my provider may prescribe that must be followed. I understand there are risks and benefits ts of taking the medicines that my provider may prescribe. I understand and agree that following this Agreement is necessary in continuing my provider-patient relationship and success of my treatment plan. As a part of my treatment plan, I agree to the following: COMMUNICATION: 
 
1. I will communicate fully with my provider about my medical condition(s), including the effect on my daily life and how well my medications are helping. I will tell my provider all of the medications that I take for any reason, including medications I receive from another health care provider, and will notify my provider about all issues, problems or concerns, including any side effects, which may be related to my medications. I understand that this information allows my provider to adjust my treatment plan to help manage my medical condition.  I understand that this information will become part of my permanent medical record. 2. I will notify my provider if I have a history of alcohol/drug misuse/addiction or if I have had treatment for alcohol/drug addiction in the past, or if I have a new problem with or concern about alcohol/drug use/addiction, because this increases the likelihood of high risk behaviors and may lead to serious medical conditions. 3. Females Only: I will notify my provider if I am or become pregnant, or if I intend to become pregnant, or if I intend to breastfeed. I understand that communication of these issues with my provider is important, due to possible effects my medication could have on an unborn fetus or breastfeeding child. Initials_____ Lita Alford DSS:[birth year] MR #:[id number] Lora 17 Page 2 of 5 MISUSE OF MEDICATIONS / DRUGS: 
 
1. I agree to take all controlled substances as prescribed, and will not misuse or abuse any controlled substances prescribed by my provider. For my safety, I will not increase the amount of medicine I take without first talking with and getting permission from my provider. 2. If I have a medical emergency, another health care provider may prescribe me medication. If I seek emergency treatment, I will notify my provider within seventy-two (72) hours. 3. I understand that my provider may discuss my use and/or possible misuse/abuse of controlled substances and alcohol, as appropriate, with any health care provider involved in my care, pharmacist or legal authority. ILLEGAL DRUGS: 
 
1. I will not use illegal drugs of any kind, including but not limited to marijuana, heroin, cocaine, or any prescription drug which is not prescribed to me. DRUG DIVERSION / PRESCRIPTION FRAUD: 
 
1. I will not share, sell, trade, give away, or otherwise misuse my prescriptions or medications. 2. I will not alter any prescriptions provided to me by my provider. SINGLE PROVIDER: 
 
1. I agree that all controlled substances that I take will be prescribed only by my provider (or his/her covering provider) under this Agreement. This agreement does not prevent me from seeking emergency medical treatment or receiving pain management related to a surgery. PROTECTING MEDICATIONS: 
 
1. I am responsible for keeping my prescriptions and medications in a safe and secure place including safeguarding them from loss or theft. I understand that lost, stolen or damaged/destroyed prescriptions or medications will not be replaced. Initials____ Gautam Betts YRP:[birth year] MR #:[id number] L[facility] 17 Page 3 of 5 PRESCRIPTION RENEWALS/REFILLS: 
 
1. I will follow my controlled substance medication schedule as prescribed by my provider. 2. I understand and agree that I will make any requests for renewals or refills of my prescriptions only at the time of an office visit or during my providers regular office hours subject to the prescription refill requirements of the individual practice. 3. I understand that my provider may not call in prescriptions for controlled substances to my pharmacy. 4. I understand that my provider may adjust or discontinue these medications as deemed appropriate for my medical treatment plan. This Agreement does not guarantee the prescription of controlled medications. 5. I agree that if my medications are adjusted or discontinued, I will properly dispose of any remaining medications. I understand that I will be required to dispose of any remaining controlled medications prior to being provided with any prescriptions for other controlled medications. 6. I understand that the renewal of my prescription depends on my medical condition, my consistent participation, and my adherence with my treatment plan and this Agreement. 7. I understand that if I do not keep an appointment with my provider, I may not receive a renewal or refill for my controlled substance medication. PRESCRIPTION MONITORING / DRUG TESTIN. I understand that my provider may require me to provide urine, saliva or blood for testing at any time. I understand that this testing will be used to monitor for safety and adherence with my treatment plan and this Agreement. 2. I understand that my provider may ask me to provide an observed urine specimen, which means that a nurse or other health care provider may watch me provide urine, and I agree to cooperate if I am asked to provide an observed specimen. 3. I understand that if I do not provide urine, saliva or blood samples within two (2) hours of my providers request, or other timeframe decided by my provider, my treatment plan could be changed, or my prescriptions and medications may be changed or ended. 4. I understand that urine, saliva and blood test results will be a part of my permanent medical record. Initials_____ Alysha Bailon [facility]: MR #:[id number] Lora 17 Page 4 of 5 
 
5. I understand that my provider is required to obtain a copy of my State Prescription Monitoring Program () Report at any time in order to safely prescribe medications. 6. I will bring all of my prescribed controlled substance medications in their original bottles to all of my scheduled appointments. 7. I understand that my provider may ask me to come to the practice with all of my prescribed medications for a random pill count at any time. I agree to cooperate if I am asked to come in for a random pill count. I understand that if I do not arrive in the timeframe decided by my provider, my treatment plan could be changed, or my prescriptions and medications may be changed or ended.  
 
COOPERATION WITH INVESTIGATIONS: 
 
 1. I authorize my provider and my pharmacy to cooperate fully with any local, state, or federal law enforcement agency in the investigation of any possible misuse, sale, or other diversion of my controlled substance prescriptions or medications. RISKS: 
 
1. I understand that my level of consciousness may be affected from the use of controlled substances, and I understand that there are risks, benefits, effects and potential alternatives (including no treatment) to the medications that my provider has prescribed. 2. I understand that I may become drowsy, tired, dizzy, constipated, and sick to my stomach, or have changes in my mood or in my sleep while taking my medications. I have talked with my provider about these possible side effects, risks, benefits, and alternative treatments, and my provider has answered all of my questions. 3. I understand that I should not suddenly stop taking my medications without first speaking with my provider. I understand that if I suddenly stop taking my medications, I may experience nausea, vomiting, sweating,anxiety, sleeplessness, itching or other uncomfortable feelings. 4. I will not take my medications with alcohol of any kind, including beer, wine or liquor. I understand that drinking alcohol with my medications increases the chances of side effects, including breathing problems or even death. 5. I understand that if I have a history of alcoholism or other drug addiction I may be at increased risk of addiction to my medications. Signs of addiction might include craving, compulsive use, and continued use despite harm. Since addiction is a disease, I understand my provider may decide to change my medications and refer me to appropriate treatment services. I understand that this information would become part of my permanent medical record. Initials_____ Christopher Hernández OW MR #:[id number] Lora [place]ara  
 Page 5 of 5  
 
 
6. Females only: Children born to women who regularly take controlled substances are likely to have physical problems and suffer withdrawal symptoms at birth. If I am of child-bearing age, I understand that I should use safe and effective birth control while taking any controlled substances to avoid the impact of medications on an unborn fetus or  child. I agree to notify my provider immediately if I should become pregnant so that my treatment plan can be adjusted. 7. Males only: I understand that chronic use of controlled substances has been associated with low testosterone levels in males which may affect my mood, stamina, sexual desire, and general health. I understand that my provider may order the appropriate laboratory test to determine my testosterone level,and I agree to this testing. ADHERENCE: 
 
1. I understand that if I do not adhere to this Agreement in any way, my provider may change my prescriptions, stop prescribing controlled substances or end our provider-patient relationship. 2. If my provider decides to stop prescribing medication, or decides to end our provider-patient relationship,my provider may require that I taper my medications slowly. If necessary, my provider may also provide a prescription for other medications to treat my withdrawal symptoms. UNDERSTANDING THIS AGREEMENT: 
 
I understand that my provider may adjust or stop my prescriptions for controlled substances based on my medical condition and my treatment plan. I understand that this Agreement does not guarantee that I will be prescribed medications or controlled substances. I understand that controlled substances may be just one part 
of my treatment plan. My initial on each page and my signature below shows that I have read each page of this Agreement, I have had an opportunity to ask questions, and all of my questions have been answered to my satisfaction by my provider. By signing below, I agree to comply with this Agreement, and I understand that if I do not follow the Agreements listed above, my provider may stop 
 
_________________________________________  Date/Time 12/30/2019 8:07 AM   
             (Patient Signature) 
 
________________________________________    Date/Time 12/30/2019 8:07 AM 
 (Parent or Guardian Signature if <18 yrs) 
 
_________________________________________ Date/Time 12/30/2019 8:07 AM 
 (Provider Signature)

## 2020-01-02 LAB — DRUGS UR: NORMAL

## 2020-01-23 DIAGNOSIS — F41.0 ANXIETY ATTACK: ICD-10-CM

## 2020-01-23 DIAGNOSIS — F41.8 INSOMNIA SECONDARY TO DEPRESSION WITH ANXIETY: ICD-10-CM

## 2020-01-23 DIAGNOSIS — F51.05 INSOMNIA SECONDARY TO DEPRESSION WITH ANXIETY: ICD-10-CM

## 2020-01-23 DIAGNOSIS — F51.05 HYPOSOMNIA, INSOMNIA OR SLEEPLESSNESS ASSOCIATED WITH ANXIETY: ICD-10-CM

## 2020-01-23 DIAGNOSIS — F41.9 HYPOSOMNIA, INSOMNIA OR SLEEPLESSNESS ASSOCIATED WITH ANXIETY: ICD-10-CM

## 2020-01-25 NOTE — TELEPHONE ENCOUNTER
Future Appointments:         Future Appointments   Date Time Provider Kailyn Boyd   7/3/2020  9:00 AM Beck Giraldo MD [facility]

## 2020-01-27 DIAGNOSIS — F51.05 HYPOSOMNIA, INSOMNIA OR SLEEPLESSNESS ASSOCIATED WITH ANXIETY: ICD-10-CM

## 2020-01-27 DIAGNOSIS — F41.8 INSOMNIA SECONDARY TO DEPRESSION WITH ANXIETY: ICD-10-CM

## 2020-01-27 DIAGNOSIS — F41.9 HYPOSOMNIA, INSOMNIA OR SLEEPLESSNESS ASSOCIATED WITH ANXIETY: ICD-10-CM

## 2020-01-27 DIAGNOSIS — F51.05 INSOMNIA SECONDARY TO DEPRESSION WITH ANXIETY: ICD-10-CM

## 2020-01-27 DIAGNOSIS — F41.0 ANXIETY ATTACK: ICD-10-CM

## 2020-01-27 RX ORDER — ALPRAZOLAM 0.5 MG/1
TABLET ORAL
Qty: 15 TAB | Refills: 5 | Status: CANCELLED | OUTPATIENT
Start: 2020-01-27

## 2020-01-27 RX ORDER — ALPRAZOLAM 0.5 MG/1
TABLET ORAL
Qty: 15 TAB | OUTPATIENT
Start: 2020-01-27

## 2020-01-27 NOTE — TELEPHONE ENCOUNTER
----- Message from Marck Rogers sent at 1/27/2020  7:50 AM EST -----  Regarding: / telephone   General Message/Vendor Calls    Caller's first and last name:  Easter Lombard    Reason for call:  Pt states that's the pharmacy never recived the prescription for \"Xanax\" and she has been without for four days now.     Callback required yes/no and why:  yes    Best contact number(s):  [phone]    Details to clarify the request:      Marck Rgoers

## 2020-03-12 ENCOUNTER — TELEPHONE (OUTPATIENT)
Dept: FAMILY MEDICINE CLINIC | Age: 50
End: 2020-03-12

## 2020-03-12 DIAGNOSIS — Z30.41 ENCOUNTER FOR BIRTH CONTROL PILLS MAINTENANCE: ICD-10-CM

## 2020-03-12 DIAGNOSIS — J45.40 MODERATE PERSISTENT ASTHMA WITHOUT COMPLICATION: ICD-10-CM

## 2020-03-12 RX ORDER — LEVONORGESTREL AND ETHINYL ESTRADIOL 0.15-0.03
1 KIT ORAL DAILY
Qty: 3 PACKAGE | Refills: 1 | Status: SHIPPED | OUTPATIENT
Start: 2020-03-12 | End: 2021-05-22

## 2020-03-12 RX ORDER — FLUTICASONE PROPIONATE AND SALMETEROL 100; 50 UG/1; UG/1
1 POWDER RESPIRATORY (INHALATION) EVERY 12 HOURS
Qty: 3 INHALER | Refills: 1 | Status: SHIPPED | OUTPATIENT
Start: 2020-03-12 | End: 2020-08-16

## 2020-03-12 NOTE — TELEPHONE ENCOUNTER
LOV 12/30/19, scheduled 7/3/20    ----- Message from Evie Ivory. Xenia Joselyn sent at 3/11/2020  8:21 PM EDT -----  Regarding: Prescription Question  Contact: [phone]  Dear Dr. Jennifer Christian,  At your convenience, could you give [facility] permission or  a prescription, so I can receive Advair and Seasonale in the mail? I went to [facility] to  Advair, and it went from $40 to $170 (per month supply) , and through the mail it will be way less expensive. My insurance wont cover the generic. The price of generic through various RX routes is $120 today. Being able to breathe has really helped my sleeping and overall well-being. Thank you for the diagnosis of Asthma. I am out of Advair.        Thank you,  Lukasz Sandoval

## 2020-03-14 RX ORDER — SERTRALINE HYDROCHLORIDE 50 MG/1
TABLET, FILM COATED ORAL
Qty: 135 TAB | Refills: 1 | Status: SHIPPED | OUTPATIENT
Start: 2020-03-14 | End: 2020-08-18

## 2020-03-17 DIAGNOSIS — I10 BENIGN ESSENTIAL HYPERTENSION: ICD-10-CM

## 2020-03-17 NOTE — TELEPHONE ENCOUNTER
Pharmacy on file verified  LOV  December 30, 2019  Requested Prescriptions     Pending Prescriptions Disp Refills    amLODIPine (NORVASC) 5 mg tablet 90 Tab 0

## 2020-03-18 NOTE — TELEPHONE ENCOUNTER
Last visit 12/30/2019 MD Berenice Waggoner   Next appointment 07/03/2020 Shefali  Previous refill encounter(s) 12/13/2019 Norvasc #90     Requested Prescriptions     Pending Prescriptions Disp Refills    amLODIPine (NORVASC) 5 mg tablet 90 Tab 0     Sig: Take 1 Tab by mouth daily.

## 2020-03-19 RX ORDER — AMLODIPINE BESYLATE 5 MG/1
5 TABLET ORAL DAILY
Qty: 90 TAB | Refills: 3 | Status: SHIPPED | OUTPATIENT
Start: 2020-03-19 | End: 2021-02-08

## 2020-03-19 NOTE — TELEPHONE ENCOUNTER
Future Appointments:         Future Appointments   Date Time Provider Kailyn Boyd   7/3/2020  9:00 AM Darrell Montgomery MD

## 2020-08-04 DIAGNOSIS — D72.820 LYMPHOCYTOSIS: ICD-10-CM

## 2020-08-04 DIAGNOSIS — E78.2 HYPERCHOLESTEROLEMIA WITH HYPERTRIGLYCERIDEMIA: ICD-10-CM

## 2020-08-04 DIAGNOSIS — F41.0 ANXIETY ATTACK: ICD-10-CM

## 2020-08-04 DIAGNOSIS — R73.03 PREDIABETES: ICD-10-CM

## 2020-08-04 DIAGNOSIS — I10 BENIGN ESSENTIAL HYPERTENSION: ICD-10-CM

## 2020-08-04 DIAGNOSIS — Z00.00 ROUTINE GENERAL MEDICAL EXAMINATION AT A HEALTH CARE FACILITY: Primary | ICD-10-CM

## 2020-08-04 DIAGNOSIS — K76.0 NAFL (NONALCOHOLIC FATTY LIVER): ICD-10-CM

## 2020-08-04 DIAGNOSIS — R31.29 MICROHEMATURIA: ICD-10-CM

## 2020-08-04 DIAGNOSIS — Z51.81 ENCOUNTER FOR MEDICATION MONITORING: ICD-10-CM

## 2020-08-04 DIAGNOSIS — E55.9 VITAMIN D DEFICIENCY: ICD-10-CM

## 2020-08-04 RX ORDER — SPIRONOLACTONE 50 MG/1
50 TABLET, FILM COATED ORAL 2 TIMES DAILY
Qty: 180 TAB | Refills: 0 | Status: SHIPPED | OUTPATIENT
Start: 2020-08-04 | End: 2020-10-27

## 2020-08-04 NOTE — TELEPHONE ENCOUNTER
Patient past due labs. She is scheduled to see me for appt . But I would like her to get her annual fasting labs done now since they were due ~ 6-2020. I placed orders. She just needs a fasting lab appt. Rx sent.

## 2020-08-04 NOTE — TELEPHONE ENCOUNTER
Last Visit: 12/30/19  MD Roosevelt Hernandez  Next Appointment: 12/31/20  MD SCHROEDER  Previous Refill Encounter(s): 12/31/19  180    Requested Prescriptions     Pending Prescriptions Disp Refills    spironolactone (ALDACTONE) 50 mg tablet 180 Tab 1     Sig: Take 1 Tab by mouth two (2) times a day.

## 2020-08-16 DIAGNOSIS — J45.40 MODERATE PERSISTENT ASTHMA WITHOUT COMPLICATION: ICD-10-CM

## 2020-08-16 RX ORDER — CEPHALEXIN 250 MG/1
CAPSULE ORAL
Qty: 3 INHALER | Refills: 3 | Status: SHIPPED | OUTPATIENT
Start: 2020-08-16 | End: 2021-08-14

## 2020-08-18 DIAGNOSIS — F41.0 PANIC ATTACKS: ICD-10-CM

## 2020-08-18 DIAGNOSIS — F41.8 DEPRESSION WITH ANXIETY: Primary | ICD-10-CM

## 2020-08-18 RX ORDER — SERTRALINE HYDROCHLORIDE 50 MG/1
TABLET, FILM COATED ORAL
Qty: 135 TAB | Refills: 0 | Status: SHIPPED | OUTPATIENT
Start: 2020-08-18 | End: 2020-11-16

## 2020-08-20 ENCOUNTER — HOSPITAL ENCOUNTER (OUTPATIENT)
Dept: MAMMOGRAPHY | Age: 50
Discharge: HOME | End: 2020-08-20
Attending: FAMILY MEDICINE
Payer: COMMERCIAL

## 2020-08-20 DIAGNOSIS — Z12.31 VISIT FOR SCREENING MAMMOGRAM: ICD-10-CM

## 2020-08-20 PROCEDURE — 77063 BREAST TOMOSYNTHESIS BI: CPT

## 2020-08-21 ENCOUNTER — APPOINTMENT (OUTPATIENT)
Dept: FAMILY MEDICINE CLINIC | Age: 50
End: 2020-08-21

## 2020-08-21 DIAGNOSIS — Z51.81 ENCOUNTER FOR MEDICATION MONITORING: ICD-10-CM

## 2020-08-21 DIAGNOSIS — K76.0 NAFL (NONALCOHOLIC FATTY LIVER): ICD-10-CM

## 2020-08-21 DIAGNOSIS — R31.29 MICROHEMATURIA: ICD-10-CM

## 2020-08-21 DIAGNOSIS — R73.03 PREDIABETES: ICD-10-CM

## 2020-08-21 DIAGNOSIS — Z00.00 ROUTINE GENERAL MEDICAL EXAMINATION AT A HEALTH CARE FACILITY: ICD-10-CM

## 2020-08-21 DIAGNOSIS — F41.0 ANXIETY ATTACK: ICD-10-CM

## 2020-08-21 DIAGNOSIS — E55.9 VITAMIN D DEFICIENCY: ICD-10-CM

## 2020-08-21 DIAGNOSIS — E78.2 HYPERCHOLESTEROLEMIA WITH HYPERTRIGLYCERIDEMIA: ICD-10-CM

## 2020-08-21 DIAGNOSIS — D72.820 LYMPHOCYTOSIS: ICD-10-CM

## 2020-08-22 LAB
25(OH)D3+25(OH)D2 SERPL-MCNC: 40.5 NG/ML (ref 30–100)
ALBUMIN SERPL-MCNC: 4.4 G/DL (ref 3.8–4.8)
ALBUMIN/GLOB SERPL: 1.8 {RATIO} (ref 1.2–2.2)
ALP SERPL-CCNC: 79 IU/L (ref 39–117)
ALT SERPL-CCNC: 63 IU/L (ref 0–32)
APPEARANCE UR: CLEAR
AST SERPL-CCNC: 46 IU/L (ref 0–40)
BACTERIA #/AREA URNS HPF: NORMAL /[HPF]
BASOPHILS # BLD AUTO: 0.1 X10E3/UL (ref 0–0.2)
BASOPHILS NFR BLD AUTO: 1 %
BILIRUB SERPL-MCNC: 0.3 MG/DL (ref 0–1.2)
BILIRUB UR QL STRIP: NEGATIVE
BUN SERPL-MCNC: 15 MG/DL (ref 6–24)
BUN/CREAT SERPL: 18 (ref 9–23)
CALCIUM SERPL-MCNC: 9.6 MG/DL (ref 8.7–10.2)
CASTS URNS QL MICRO: NORMAL /LPF
CHLORIDE SERPL-SCNC: 97 MMOL/L (ref 96–106)
CHOLEST SERPL-MCNC: 239 MG/DL (ref 100–199)
CO2 SERPL-SCNC: 19 MMOL/L (ref 20–29)
COLOR UR: YELLOW
CREAT SERPL-MCNC: 0.84 MG/DL (ref 0.57–1)
EOSINOPHIL # BLD AUTO: 0.3 X10E3/UL (ref 0–0.4)
EOSINOPHIL NFR BLD AUTO: 3 %
EPI CELLS #/AREA URNS HPF: NORMAL /HPF (ref 0–10)
ERYTHROCYTE [DISTWIDTH] IN BLOOD BY AUTOMATED COUNT: 13.3 % (ref 11.7–15.4)
EST. AVERAGE GLUCOSE BLD GHB EST-MCNC: 117 MG/DL
GLOBULIN SER CALC-MCNC: 2.4 G/DL (ref 1.5–4.5)
GLUCOSE SERPL-MCNC: 90 MG/DL (ref 65–99)
GLUCOSE UR QL: NEGATIVE
HBA1C MFR BLD: 5.7 % (ref 4.8–5.6)
HCT VFR BLD AUTO: 40.9 % (ref 34–46.6)
HDLC SERPL-MCNC: 43 MG/DL
HGB BLD-MCNC: 13.5 G/DL (ref 11.1–15.9)
HGB UR QL STRIP: NEGATIVE
IMM GRANULOCYTES # BLD AUTO: 0 X10E3/UL (ref 0–0.1)
IMM GRANULOCYTES NFR BLD AUTO: 0 %
INTERPRETATION, 910389: NORMAL
KETONES UR QL STRIP: NEGATIVE
LDLC SERPL CALC-MCNC: 132 MG/DL (ref 0–99)
LEUKOCYTE ESTERASE UR QL STRIP: ABNORMAL
LYMPHOCYTES # BLD AUTO: 3.8 X10E3/UL (ref 0.7–3.1)
LYMPHOCYTES NFR BLD AUTO: 37 %
MCH RBC QN AUTO: 29.3 PG (ref 26.6–33)
MCHC RBC AUTO-ENTMCNC: 33 G/DL (ref 31.5–35.7)
MCV RBC AUTO: 89 FL (ref 79–97)
MICRO URNS: ABNORMAL
MONOCYTES # BLD AUTO: 0.7 X10E3/UL (ref 0.1–0.9)
MONOCYTES NFR BLD AUTO: 7 %
NEUTROPHILS # BLD AUTO: 5.5 X10E3/UL (ref 1.4–7)
NEUTROPHILS NFR BLD AUTO: 52 %
NITRITE UR QL STRIP: NEGATIVE
PH UR STRIP: 6.5 [PH] (ref 5–7.5)
PLATELET # BLD AUTO: 464 X10E3/UL (ref 150–450)
POTASSIUM SERPL-SCNC: 4.4 MMOL/L (ref 3.5–5.2)
PROT SERPL-MCNC: 6.8 G/DL (ref 6–8.5)
PROT UR QL STRIP: NEGATIVE
RBC # BLD AUTO: 4.6 X10E6/UL (ref 3.77–5.28)
RBC #/AREA URNS HPF: NORMAL /HPF (ref 0–2)
SODIUM SERPL-SCNC: 131 MMOL/L (ref 134–144)
SP GR UR: 1.01 (ref 1–1.03)
TRIGL SERPL-MCNC: 321 MG/DL (ref 0–149)
TSH SERPL DL<=0.005 MIU/L-ACNC: 3.34 UIU/ML (ref 0.45–4.5)
UROBILINOGEN UR STRIP-MCNC: 0.2 MG/DL (ref 0.2–1)
VLDLC SERPL CALC-MCNC: 64 MG/DL (ref 5–40)
WBC # BLD AUTO: 10.4 X10E3/UL (ref 3.4–10.8)
WBC #/AREA URNS HPF: NORMAL /HPF (ref 0–5)

## 2020-08-25 LAB — DRUGS UR: NORMAL

## 2020-08-30 ENCOUNTER — TELEPHONE (OUTPATIENT)
Dept: FAMILY MEDICINE CLINIC | Age: 50
End: 2020-08-30

## 2020-08-30 DIAGNOSIS — E87.1 HYPONATREMIA: Primary | ICD-10-CM

## 2020-08-30 DIAGNOSIS — K76.0 NAFL (NONALCOHOLIC FATTY LIVER): ICD-10-CM

## 2020-08-30 NOTE — TELEPHONE ENCOUNTER
Platelets in her baseline range. Lymphocytes mildly elevated but in her baseline range. Overall stable counts. Is she to follow up w/ Hem-Onc Dr. Julius Quirogas any more or just prn? Cholesterol numbers are elevated and have worsened since last check, highest readings she has had the past several years. HgbA1c is back in mild prediabetes range. She needs to follow a strict sugar free, low carb diet, and get at least 150 minutes of cardio exercise per week. Liver enzymes are up. She has h/o fatty liver disease and has not seen Dr. Love Houston in years. Was she instructed to follow up w/ him any more at some point. Otherwise she needs to focus on avoiding Nsaids, following the dietary recs discussed above, and working on weight reduction. Urine, thyroid, vitamin d, and kidney function normal.     Sodium is low which can be due to her Aldactone or Zoloft or other causes. She is not scheduled to see me for her annual checkup til December but I want to follow up on these abnormal labs prior to then. I would like to get some additional labs on her at this time, but she does not need to fast for that. Advise pt due to current lab staffing shortages, we are now sending patients not in the office for an appointment to [facility]. I have placed the order in the system. Please print lab requisition order and copy of the [facility] listings/locations and either mail to patient or leave up front for her to .

## 2020-09-12 NOTE — TELEPHONE ENCOUNTER
Marshall Carreno, I had routed this to you back on 8/30 but it doesn't look like anything was done w/ it and it is still in my basket. Do you remember addressing this? Just wanted to follow up on it bc it is still pending in my box. Thanks.

## 2020-09-14 NOTE — TELEPHONE ENCOUNTER
**Late entry**    TC to pt. ID verified. Advised pt of lab results per Dr. Du Parker.  Pt will repeat labs and will check back in with her specality

## 2020-10-28 DIAGNOSIS — F41.9 HYPOSOMNIA, INSOMNIA OR SLEEPLESSNESS ASSOCIATED WITH ANXIETY: ICD-10-CM

## 2020-10-28 DIAGNOSIS — F41.8 INSOMNIA SECONDARY TO DEPRESSION WITH ANXIETY: ICD-10-CM

## 2020-10-28 DIAGNOSIS — F51.05 HYPOSOMNIA, INSOMNIA OR SLEEPLESSNESS ASSOCIATED WITH ANXIETY: ICD-10-CM

## 2020-10-28 DIAGNOSIS — F51.05 INSOMNIA SECONDARY TO DEPRESSION WITH ANXIETY: ICD-10-CM

## 2020-10-28 DIAGNOSIS — F41.0 ANXIETY ATTACK: ICD-10-CM

## 2020-10-29 RX ORDER — ALPRAZOLAM 0.5 MG/1
TABLET ORAL
Qty: 15 TAB | Refills: 1 | Status: SHIPPED | OUTPATIENT
Start: 2020-10-29 | End: 2020-12-31 | Stop reason: SDUPTHER

## 2020-11-16 DIAGNOSIS — F41.0 PANIC ATTACKS: ICD-10-CM

## 2020-11-16 DIAGNOSIS — F41.8 DEPRESSION WITH ANXIETY: ICD-10-CM

## 2020-11-16 RX ORDER — SERTRALINE HYDROCHLORIDE 50 MG/1
TABLET, FILM COATED ORAL
Qty: 135 TAB | Refills: 0 | Status: SHIPPED | OUTPATIENT
Start: 2020-11-16 | End: 2021-02-14

## 2020-11-23 LAB
ALBUMIN SERPL-MCNC: 4.5 G/DL (ref 3.8–4.8)
ALP SERPL-CCNC: 90 IU/L (ref 39–117)
ALT SERPL-CCNC: 41 IU/L (ref 0–32)
AST SERPL-CCNC: 34 IU/L (ref 0–40)
BILIRUB DIRECT SERPL-MCNC: 0.1 MG/DL (ref 0–0.4)
BILIRUB SERPL-MCNC: 0.4 MG/DL (ref 0–1.2)
OSMOLALITY SERPL: 276 MOSMOL/KG (ref 275–295)
OSMOLALITY UR: 661 MOSMOL/KG
PROT SERPL-MCNC: 7.2 G/DL (ref 6–8.5)
SODIUM 24H UR-SCNC: 54 MMOL/L
SODIUM SERPL-SCNC: 136 MMOL/L (ref 134–144)

## 2020-12-02 DIAGNOSIS — I10 BENIGN ESSENTIAL HYPERTENSION: ICD-10-CM

## 2020-12-02 RX ORDER — SPIRONOLACTONE 50 MG/1
50 TABLET, FILM COATED ORAL 2 TIMES DAILY
Qty: 180 TAB | Refills: 0 | Status: SHIPPED | OUTPATIENT
Start: 2020-12-02 | End: 2021-06-07 | Stop reason: SDUPTHER

## 2020-12-02 NOTE — TELEPHONE ENCOUNTER
Last Visit: 12/30/19  MD Ora Salinas, labs 8/21/20  Next Appointment: 12/31/20  MD SCHROEDER  Previous Refill Encounter(s): 10/27/20  60    Requested Prescriptions     Pending Prescriptions Disp Refills    spironolactone (ALDACTONE) 50 mg tablet 60 Tab 0     Sig: Take 1 Tab by mouth two (2) times a day.

## 2020-12-31 ENCOUNTER — OFFICE VISIT (OUTPATIENT)
Dept: FAMILY MEDICINE CLINIC | Age: 50
End: 2020-12-31
Payer: COMMERCIAL

## 2020-12-31 ENCOUNTER — HOSPITAL ENCOUNTER (OUTPATIENT)
Dept: LAB | Age: 50
Discharge: HOME | End: 2020-12-31
Payer: COMMERCIAL

## 2020-12-31 VITALS
HEART RATE: 80 BPM | RESPIRATION RATE: 18 BRPM | BODY MASS INDEX: 37.32 KG/M2 | SYSTOLIC BLOOD PRESSURE: 132 MMHG | TEMPERATURE: 98.3 F | WEIGHT: 232.2 LBS | OXYGEN SATURATION: 99 % | HEIGHT: 66 IN | DIASTOLIC BLOOD PRESSURE: 88 MMHG

## 2020-12-31 DIAGNOSIS — Z01.419 WELL WOMAN EXAM WITH ROUTINE GYNECOLOGICAL EXAM: ICD-10-CM

## 2020-12-31 DIAGNOSIS — F51.05 HYPOSOMNIA, INSOMNIA OR SLEEPLESSNESS ASSOCIATED WITH ANXIETY: ICD-10-CM

## 2020-12-31 DIAGNOSIS — K76.0 NAFL (NONALCOHOLIC FATTY LIVER): ICD-10-CM

## 2020-12-31 DIAGNOSIS — F41.1 GENERALIZED ANXIETY DISORDER WITH PANIC ATTACKS: ICD-10-CM

## 2020-12-31 DIAGNOSIS — F41.9 HYPOSOMNIA, INSOMNIA OR SLEEPLESSNESS ASSOCIATED WITH ANXIETY: ICD-10-CM

## 2020-12-31 DIAGNOSIS — R73.03 PREDIABETES: ICD-10-CM

## 2020-12-31 DIAGNOSIS — D75.839 THROMBOCYTOSIS: ICD-10-CM

## 2020-12-31 DIAGNOSIS — Z23 NEEDS FLU SHOT: ICD-10-CM

## 2020-12-31 DIAGNOSIS — Z00.00 ROUTINE GENERAL MEDICAL EXAMINATION AT A HEALTH CARE FACILITY: Primary | ICD-10-CM

## 2020-12-31 DIAGNOSIS — Z12.11 SCREENING FOR COLORECTAL CANCER: ICD-10-CM

## 2020-12-31 DIAGNOSIS — F32.A CHRONIC DEPRESSION: ICD-10-CM

## 2020-12-31 DIAGNOSIS — Z79.899 CONTROLLED SUBSTANCE AGREEMENT SIGNED: ICD-10-CM

## 2020-12-31 DIAGNOSIS — E78.2 HYPERCHOLESTEROLEMIA WITH HYPERTRIGLYCERIDEMIA: ICD-10-CM

## 2020-12-31 DIAGNOSIS — F41.0 GENERALIZED ANXIETY DISORDER WITH PANIC ATTACKS: ICD-10-CM

## 2020-12-31 DIAGNOSIS — Z12.12 SCREENING FOR COLORECTAL CANCER: ICD-10-CM

## 2020-12-31 DIAGNOSIS — I10 BENIGN ESSENTIAL HYPERTENSION: ICD-10-CM

## 2020-12-31 LAB
ALBUMIN SERPL-MCNC: 4.1 G/DL (ref 3.5–5)
ALBUMIN/GLOB SERPL: 1.2 {RATIO} (ref 1.1–2.2)
ALP SERPL-CCNC: 94 U/L (ref 45–117)
ALT SERPL-CCNC: 46 U/L (ref 12–78)
ANION GAP SERPL CALC-SCNC: 5 MMOL/L (ref 5–15)
AST SERPL-CCNC: 28 U/L (ref 15–37)
BILIRUB SERPL-MCNC: 0.5 MG/DL (ref 0.2–1)
BUN SERPL-MCNC: 13 MG/DL (ref 6–20)
BUN/CREAT SERPL: 16 (ref 12–20)
CALCIUM SERPL-MCNC: 9.7 MG/DL (ref 8.5–10.1)
CHLORIDE SERPL-SCNC: 104 MMOL/L (ref 97–108)
CHOLEST SERPL-MCNC: 247 MG/DL
CO2 SERPL-SCNC: 22 MMOL/L (ref 21–32)
CREAT SERPL-MCNC: 0.82 MG/DL (ref 0.55–1.02)
EST. AVERAGE GLUCOSE BLD GHB EST-MCNC: 111 MG/DL
GLOBULIN SER CALC-MCNC: 3.3 G/DL (ref 2–4)
GLUCOSE SERPL-MCNC: 97 MG/DL (ref 65–100)
HBA1C MFR BLD: 5.5 % (ref 4–5.6)
HDLC SERPL-MCNC: 44 MG/DL
HDLC SERPL: 5.6 {RATIO} (ref 0–5)
LDLC SERPL CALC-MCNC: 137.4 MG/DL (ref 0–100)
LIPID PROFILE,FLP: ABNORMAL
POTASSIUM SERPL-SCNC: 4.6 MMOL/L (ref 3.5–5.1)
PROT SERPL-MCNC: 7.4 G/DL (ref 6.4–8.2)
SODIUM SERPL-SCNC: 131 MMOL/L (ref 136–145)
TRIGL SERPL-MCNC: 328 MG/DL (ref ?–150)
VLDLC SERPL CALC-MCNC: 65.6 MG/DL

## 2020-12-31 PROCEDURE — 87624 HPV HI-RISK TYP POOLED RSLT: CPT

## 2020-12-31 PROCEDURE — 90471 IMMUNIZATION ADMIN: CPT | Performed by: FAMILY MEDICINE

## 2020-12-31 PROCEDURE — 99214 OFFICE O/P EST MOD 30 MIN: CPT | Performed by: FAMILY MEDICINE

## 2020-12-31 PROCEDURE — 88175 CYTOPATH C/V AUTO FLUID REDO: CPT

## 2020-12-31 PROCEDURE — 99396 PREV VISIT EST AGE 40-64: CPT | Performed by: FAMILY MEDICINE

## 2020-12-31 PROCEDURE — 90686 IIV4 VACC NO PRSV 0.5 ML IM: CPT | Performed by: FAMILY MEDICINE

## 2020-12-31 RX ORDER — CYCLOBENZAPRINE HCL 10 MG
10 TABLET ORAL
Qty: 30 TAB | Refills: 1 | Status: SHIPPED | OUTPATIENT
Start: 2020-12-31 | End: 2022-10-20 | Stop reason: SDUPTHER

## 2020-12-31 RX ORDER — ALPRAZOLAM 0.5 MG/1
0.25 TABLET ORAL
Qty: 15 TAB | Refills: 5 | Status: SHIPPED | OUTPATIENT
Start: 2020-12-31 | End: 2021-06-07 | Stop reason: SDUPTHER

## 2020-12-31 NOTE — LETTER
Rashawn Mauricio [facility]:[birth year] MR #:[id number] Lora [address] Page 1 of 5 CONTROLLED SUBSTANCE AGREEMENT I may be prescribed medications that are controlled substances as part  of my treatment plan for management of my medical condition(s). The goal of my treatment plan is to maintain and/or improve my health and wellbeing. Because controlled substances have an increased risk of abuse or harm, continual re-evaluation is needed determine if the goals of my treatment plan are being met for my safety and the safety of others. Philip Hurtado  am entering into this Controlled Substance Agreement with my provider, Dr. Ela Pierre  at [place] [address] . I understand that successful treatment requires mutual trust and honesty between me and my provider. I understand that there are state and federal laws and regulations which apply to the medications that my provider may prescribe that must be followed. I understand there are risks and benefits ts of taking the medicines that my provider may prescribe. I understand and agree that following this Agreement is necessary in continuing my provider-patient relationship and success of my treatment plan. As a part of my treatment plan, I agree to the following: COMMUNICATION: 
 
1. I will communicate fully with my provider about my medical condition(s), including the effect on my daily life and how well my medications are helping. I will tell my provider all of the medications that I take for any reason, including medications I receive from another health care provider, and will notify my provider about all issues, problems or concerns, including any side effects, which may be related to my medications. I understand that this information allows my provider to adjust my treatment plan to help manage my medical condition. I understand that this information will become part of my permanent medical record. 2. I will notify my provider if I have a history of alcohol/drug misuse/addiction or if I have had treatment for alcohol/drug addiction in the past, or if I have a new problem with or concern about alcohol/drug use/addiction, because this increases the likelihood of high risk behaviors and may lead to serious medical conditions. 3. Females Only: I will notify my provider if I am or become pregnant, or if I intend to become pregnant, or if I intend to breastfeed. I understand that communication of these issues with my provider is important, due to possible effects my medication could have on an unborn fetus or breastfeeding child. Initials_____ Doe Thomas [facility]:[birth year] MR #:[id number] Lora 17 Page 2 of 5 MISUSE OF MEDICATIONS / DRUGS: 
 
1. I agree to take all controlled substances as prescribed, and will not misuse or abuse any controlled substances prescribed by my provider. For my safety, I will not increase the amount of medicine I take without first talking with and getting permission from my provider. 2. If I have a medical emergency, another health care provider may prescribe me medication. If I seek emergency treatment, I will notify my provider within seventy-two (72) hours. 3. I understand that my provider may discuss my use and/or possible misuse/abuse of controlled substances and alcohol, as appropriate, with any health care provider involved in my care, pharmacist or legal authority. ILLEGAL DRUGS: 
 
1. I will not use illegal drugs of any kind, including but not limited to marijuana, heroin, cocaine, or any prescription drug which is not prescribed to me.  
 
DRUG DIVERSION / PRESCRIPTION FRAUD: 
 
 1. I will not share, sell, trade, give away, or otherwise misuse my prescriptions or medications. 2. I will not alter any prescriptions provided to me by my provider. SINGLE PROVIDER: 
 
1. I agree that all controlled substances that I take will be prescribed only by my provider (or his/her covering provider) under this Agreement. This agreement does not prevent me from seeking emergency medical treatment or receiving pain management related to a surgery. PROTECTING MEDICATIONS: 
 
1. I am responsible for keeping my prescriptions and medications in a safe and secure place including safeguarding them from loss or theft. I understand that lost, stolen or damaged/destroyed prescriptions or medications will not be replaced. Initials____ [facility]:[birth year] MR #:[id number] Lora 17 Page 3 of 5 PRESCRIPTION RENEWALS/REFILLS: 
 
1. I will follow my controlled substance medication schedule as prescribed by my provider. 2. I understand and agree that I will make any requests for renewals or refills of my prescriptions only at the time of an office visit or during my providers regular office hours subject to the prescription refill requirements of the individual practice. 3. I understand that my provider may not call in prescriptions for controlled substances to my pharmacy. 4. I understand that my provider may adjust or discontinue these medications as deemed appropriate for my medical treatment plan. This Agreement does not guarantee the prescription of controlled medications. 5. I agree that if my medications are adjusted or discontinued, I will properly dispose of any remaining medications. I understand that I will be required to dispose of any remaining controlled medications prior to being provided with any prescriptions for other controlled medications. 6. I understand that the renewal of my prescription depends on my medical condition, my consistent participation, and my adherence with my treatment plan and this Agreement. 7. I understand that if I do not keep an appointment with my provider, I may not receive a renewal or refill for my controlled substance medication. PRESCRIPTION MONITORING / DRUG TESTIN. I understand that my provider may require me to provide urine, saliva or blood for testing at any time. I understand that this testing will be used to monitor for safety and adherence with my treatment plan and this Agreement. 2. I understand that my provider may ask me to provide an observed urine specimen, which means that a nurse or other health care provider may watch me provide urine, and I agree to cooperate if I am asked to provide an observed specimen. 3. I understand that if I do not provide urine, saliva or blood samples within two (2) hours of my providers request, or other timeframe decided by my provider, my treatment plan could be changed, or my prescriptions and medications may be changed or ended. 4. I understand that urine, saliva and blood test results will be a part of my permanent medical record. Initials_____ Kristian Fields ZSS: MR #:[id number] Lora 17 Page 4 of 5 
 
 
1. I authorize my provider and my pharmacy to cooperate fully with any local, state, or federal law enforcement agency in the investigation of any possible misuse, sale, or other diversion of my controlled substance prescriptions or medications. RISKS: 
 
1. I understand that my level of consciousness may be affected from the use of controlled substances, and I understand that there are risks, benefits, effects and potential alternatives (including no treatment) to the medications that my provider has prescribed. 2. I understand that I may become drowsy, tired, dizzy, constipated, and sick to my stomach, or have changes in my mood or in my sleep while taking my medications. I have talked with my provider about these possible side effects, risks, benefits, and alternative treatments, and my provider has answered all of my questions. 3. I understand that I should not suddenly stop taking my medications without first speaking with my provider. I understand that if I suddenly stop taking my medications, I may experience nausea, vomiting, sweating,anxiety, sleeplessness, itching or other uncomfortable feelings. 4. I will not take my medications with alcohol of any kind, including beer, wine or liquor. I understand that drinking alcohol with my medications increases the chances of side effects, including breathing problems or even death. 5. I understand that if I have a history of alcoholism or other drug addiction I may be at increased risk of addiction to my medications. Signs of addiction might include craving, compulsive use, and continued use despite harm. Since addiction is a disease, I understand my provider may decide to change my medications and refer me to appropriate treatment services. I understand that this information would become part of my permanent medical record. Initials_____ Deborah Perera [facility]: MR #:[id number] Lora 17 Page 5 of 5  
 
 
6. Females only: Children born to women who regularly take controlled substances are likely to have physical problems and suffer withdrawal symptoms at birth. If I am of child-bearing age, I understand that I should use safe and effective birth control while taking any controlled substances to avoid the impact of medications on an unborn fetus or  child. I agree to notify my provider immediately if I should become pregnant so that my treatment plan can be adjusted. 7. Males only: I understand that chronic use of controlled substances has been associated with low testosterone levels in males which may affect my mood, stamina, sexual desire, and general health. I understand that my provider may order the appropriate laboratory test to determine my testosterone level,and I agree to this testing. ADHERENCE: 
 
 
UNDERSTANDING THIS AGREEMENT: 
 
 I understand that my provider may adjust or stop my prescriptions for controlled substances based on my medical condition and my treatment plan. I understand that this Agreement does not guarantee that I will be prescribed medications or controlled substances. I understand that controlled substances may be just one part 
of my treatment plan. My initial on each page and my signature below shows that I have read each page of this Agreement, I have had an opportunity to ask questions, and all of my questions have been answered to my satisfaction by my provider. By signing below, I agree to comply with this Agreement, and I understand that if I do not follow the Agreements listed above, my provider may stop 
 
_________________________________________  Date/Time 12/31/2020 9:13 AM   
             (Patient Signature) 
 
________________________________________    Date/Time 12/31/2020 9:13 AM 
 (Parent or Guardian Signature if <18 yrs) 
 
_________________________________________ Date/Time 12/31/2020 9:13 AM 
 (Provider Signature)

## 2020-12-31 NOTE — PROGRESS NOTES
Chief Complaint   Patient presents with    Complete Physical     1. Have you been to the ER, urgent care clinic since your last visit? Hospitalized since your last visit? No    2. Have you seen or consulted any other health care providers outside of the [address] [place] [address] since your last visit? Include any pap smears or colon screening. Silvia     Bautista Mccallum  is a 48 y.o.  female  who present for Flu immunizations/injections. He/she denies any symptoms , reactions or allergies that would exclude them from being immunized today. Risks and adverse reactions were discussed and the VIS was given if applicable to them. All questions were addressed.       Kelly Shepard LPN

## 2020-12-31 NOTE — PROGRESS NOTES
Chief Complaint   Patient presents with    Complete Physical     fasting    Well Woman    Gyn Exam     pap    Cholesterol Problem    Hypertension    Anxiety    Medication Management     controlled medication check and refill       HISTORY OF PRESENT ILLNESS   HPI  Annual CPE      Fasting follow up hypercholesterolemia, hypertension, prediabetes, fatty liver  Started \"[employer]\" healthy meal prep diet 8-2020  36 oz sodas a day ([facility])  No regular exercise but stays active at work  Weight is up but she states since eating healthier she feels a lot better  She has not been monitoring her home BP's lately    Well Woman Visit/Gyn Exam  Continues to do well on current regimen of BCP which she takes for control of primary dysmenorrhea w/ good control. She is in a same sex relationship and does not engage in intercourse. No h/o abnormal paps. Last pap 2017 negative as well. No /Gyn/Breast complaints  Feels a small bump in rectal area when wiping. No rectal bleeding, itching or pain. Sometimes slight smear of bright red blood on toilet paper after wiping. 6 month follow up depression, anxiety, medication check on Zoloft and controlled medication check on Xanax. Doing well on all her meds currently. Mood and energy good on Zoloft daily.   Also overall feels that the Zoloft is keeping her mood stable, but she still relies on taking 1/2 tablet of Xanax qhs to help her fall asleep, stay asleep soundly. Keeps her mind from racing all night, decreases night time panic attacks and enables her to rest so that she can awaken feeling refreshed and functional for the following day. REVIEW OF SYMPTOMS   Review of Systems   Constitutional: Negative. HENT: Negative. Eyes: Negative. Respiratory: Negative. Asthma under much better control since the Advair. Seldom needs rescue now   Cardiovascular: Negative.     Gastrointestinal: Negative for constipation, diarrhea, melena, nausea and vomiting. Genitourinary: Negative. Musculoskeletal:        Takes Flexeril a few x a year if she gets back spasms, works well when needed. She avoids nsaids and seldom any Tylenol due to h/o fatty liver. She would like an updated rx for the Flexeril to have on hand   Neurological: Negative for dizziness, sensory change, focal weakness and headaches. Endo/Heme/Allergies: Negative. PROBLEM LIST/MEDICAL HISTORY     Problem List  Date Reviewed: 12/31/2020          Codes Class Noted    Benign essential hypertension ICD-10-CM: I10  ICD-9-CM: 401.1  2/24/2015    Overview Signed 2/24/2015 10:21 AM by Juanita Vazquez MD     [id number]             Hyposomnia, insomnia or sleeplessness associated with anxiety ICD-10-CM: F51.05, F41.9  ICD-9-CM: 293.84, 327.02  12/26/2018        Controlled substance agreement signed ICD-10-CM: Z79.899  ICD-9-CM: V58.69  5/24/2018    Overview Addendum 6/26/2019  9:26 AM by Juanita Vazquez MD     [id number], 1-2019             Prediabetes ICD-10-CM: R73.03  ICD-9-CM: 790.29  4/23/2017    Overview Signed 4/23/2017  9:16 PM by Juanita Vazquez MD     Mild 4-2017             Muscle contraction headaches & Neck Muscle Spasms ICD-10-CM: G44.209  ICD-9-CM: 307.81  4/10/2017        Thrombocytosis ([facility] [address] ICD-10-CM: D47.3  ICD-9-CM: 238.71  3/22/2016    Overview Addendum 5/24/2018 10:44 AM by Juanita Vazquez MD     Evaluation Hem-Onc Dr Ryann Walker 2015: Based on the bone marrow biopsy and the lab results there is no clear reason for her thrombocytosis. There is no sign of a primary hematologic disorder. This is likely secondary thrombocytosis versus normal for her. All of the results that we have for her have been high-normal.  This makes malignancy less likely. She has seen rheumatology and there is no sign of autoimmune disorder. Imaging negative for malignancy.              Lymphocytosis ICD-10-CM: P20.221  ICD-9-CM: 288.61  3/22/2016    Overview Signed 3/22/2016 10:08 AM by MD Dr Sandy Cantrell, 2015             Microhematuria ICD-10-CM: C62.97  ICD-9-CM: 599.72  12/15/2015    Overview Signed 12/15/2015  3:47 PM by Kimberly Joe MD     [id number] [facility] Dr Micah Santoro. KUB neg excpet small stone vs phlebolith             NAFL (nonalcoholic fatty liver) [id number]: K76.0  ICD-9-CM: 571.8  8/27/2015    Overview Signed 8/27/2015  8:45 AM by MD Dr Socorro Cantrell             Focal nodular hyperplasia of liver ICD-10-CM: K76.89  ICD-9-CM: 573.8  5/10/2015    Overview Signed 8/27/2015  8:45 AM by Kimberly Joe MD     GI Dr Socorro Weston and Onc Dr Sandy Still             Vitamin D deficiency ICD-10-CM: E55.9  ICD-9-CM: 268.9  12/13/2011    Overview Signed 12/13/2011  1:16 PM by Kimberly Joe MD     10/2011             Hypercholesterolemia with high triglycerides ICD-10-CM: E78.2  ICD-9-CM: 272.2  12/13/2011        Chronic Mechanical back pain ICD-10-CM: M54.9  ICD-9-CM: 724.5  10/25/2011        History of pyelonephritis, 1992 ICD-10-CM: Z87.448  ICD-9-CM: V13.02  10/25/2011        Allergic rhinitis ICD-10-CM: J30.9  ICD-9-CM: 477.9  10/25/2011    Overview Signed 10/25/2011  9:45 AM by Kimberly Joe MD     Worse in Spring             Cataract of right eye ICD-10-CM: H26.9  ICD-9-CM: 366.9  10/25/2011    Overview Signed 10/25/2011  9:46 AM by Kimberly Joe MD     Opth VEI, Dr Debora Moise             S/P Dog bite, 2003 ICD-10-CM: W54. 0XXA  ICD-9-CM: E906.0  10/25/2011    Overview Signed 10/25/2011  9:46 AM by Kimberly Joe MD     Td and Rabies Series             Rosacea ICD-10-CM: L71.9  ICD-9-CM: 695.3  10/25/2011        Fibrocystic breast changes ICD-10-CM: N60.19  ICD-9-CM: 610.1  10/25/2011        Raynaud's syndrome ICD-10-CM: I73.00  ICD-9-CM: 443.0  Unknown    Overview Signed 8/27/2015  8:48 AM by Kimberly Joe MD     Autoimmune workup Dr Poppy Loredo [place] 2015             PMS (premenstrual syndrome) ICD-10-CM: N94.3  ICD-9-CM: 625.4  6/21/2010        Depression ICD-10-CM: F32.9  ICD-9-CM: 533  6/21/2010        Asthma, mild intermittent ICD-10-CM: J45.909  ICD-9-CM: 493.90  6/21/2010    Overview Signed 10/25/2011  9:45 AM by Juanita Vazquez MD     Since ~ 20 yo             Primary dysmenorrhea ICD-10-CM: N94.4  ICD-9-CM: 625.3  6/21/2010        Anxiety attacks ICD-10-CM: F41.0  ICD-9-CM: 300.01  6/21/2010    Overview Signed 6/21/2010  8:28 PM by Juanita Vazquez MD     March 2010                       PAST SURGICAL HISTORY     Past Surgical History:   Procedure Laterality Date    EKG TREADMILL STRESS TEST  May 2010    Dr Sridevi Choudhary and Dr Jackie hodge for atypical CP; dx Anxiety    EMG TWO EXTREMITIES UPPER  1999    normal    HX OTHER SURGICAL  3/2014 ordered by Dr Thao López, benign    HX SHOULDER ARTHROSCOPY  1996    left shoulder    US PELV NON OBS  2004    normal    XR SPINE LUMB 2 OR 3 V  1993    normal    XR SPINE THORAC 3 V  1993    normal    XR UPPER GI SERIES W KUB  2000    normal         MEDICATIONS     Current Outpatient Medications   Medication Sig    spironolactone (ALDACTONE) 50 mg tablet Take 1 Tab by mouth two (2) times a day. (Patient taking differently: Take 50 mg by mouth daily. QAM)    sertraline (ZOLOFT) 50 mg tablet TAKE ONE AND ONE-HALF TABLETS DAILY    ALPRAZolam (XANAX) 0.5 mg tablet TAKE 1/2 TABLET BY MOUTH EVERY EVENING AS NEEDED FOR ANXIETY/SLEEP    Advair Diskus 100-50 mcg/dose diskus inhaler USE 1 INHALATION EVERY 12 HOURS    amLODIPine (NORVASC) 5 mg tablet Take 1 Tab by mouth daily.  levonorgestrel-ethinyl estradiol (SEASONALE) 0.15 mg-30 mcg (91) 3MPk Take 1 Tab by mouth daily.  fluticasone propionate (FLONASE NA) by Nasal route daily.  albuterol (VENTOLIN HFA) 90 mcg/actuation inhaler Take 2 Puffs by inhalation every six (6) hours as needed for Wheezing.     cholecalciferol, vitamin D3, (VITAMIN D3) 2,000 unit tab Take  by mouth daily.  ascorbic acid, vitamin C, (VITAMIN C) 1,000 mg tablet Take 1,000 mg by mouth daily.  turmeric (CURCUMIN) Take  by mouth daily.  cranberry extract 450 mg tab tablet Take 450 mg by mouth.  b complex vitamins tablet Take 1 Tab by mouth daily.  garlic cap Take  by mouth.  cyclobenzaprine (FLEXERIL) 10 mg tablet Take 10 mg by mouth as needed for Muscle Spasm(s). Takes a few x a month max    SLO-NIACIN PO Take 500 mg by mouth daily.  loratadine (CLARITIN) 10 mg tablet Take 10 mg by mouth daily.  aspirin delayed-release 81 mg tablet Take  by mouth daily. No current facility-administered medications for this visit. ALLERGIES     Allergies   Allergen Reactions    Crestor [Rosuvastatin] Myalgia     Insomnia and muscle pain    Flonase [Fluticasone] Other (comments)     Cataracts enlarged    Hydrochlorothiazide Other (comments)     Insomnia, headache, ringing in ears, anxious    Nortriptyline Other (comments)     Increased depression    Other Medication Other (comments)     Pt is avoiding Nasal CS d/t cataracts    Sulfa (Sulfonamide Antibiotics) Hives          SOCIAL HISTORY     Social History     Socioeconomic History    Marital status: SINGLE     Spouse name: Not on file    Number of children: 0    Years of education: Not on file    Highest education level: Not on file   Occupational History    Occupation:     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 &    Tobacco Use    Smoking status: Former Smoker     Types: Cigarettes     Quit date: 10/25/1991     Years since quittin.2    Smokeless tobacco: Never Used    Tobacco comment: smoked lightly for about 5 months   Substance and Sexual Activity    Alcohol use:  Yes     Alcohol/week: 0.0 standard drinks     Comment: very seldom    Drug use: No    Sexual activity: Never Partners: Female     Comment: Same sex partner   Other Topics Concern    Caffeine Concern Yes     Comment: 3 12 oz cans of diet Mountain Dews a day    Weight Concern Yes     Comment: wt trending upwards over the years    Special Diet No    Exercise No     Comment: not as much as before, but still trying to get out and walk the dog some, plans to increase that some more   Social History Narrative    Started [employer] Fresh\" healthy meal prep diet     36 oz sodas a day ([facility])    No regular exercise but stays active at work        SERGEI Gallo History   Administered Date(s) Administered    Influenza Vaccine 2014, 10/29/2017, 10/29/2017, 10/18/2018    Influenza Vaccine (Quad) PF (>6 Mo Flulaval, Fluarix, and >3 Yrs [address] [place] [address], [place] [id number]) 10/13/2019    Meningococcal Vaccine 2002    PPD 1998, 1999    Pneumococcal Polysaccharide (PPSV-23) 2019    Rabies Vaccine 2003    TD Vaccine 1998, 2003    Tdap 2014         FAMILY HISTORY     Family History   Problem Relation Age of Onset    High Cholesterol Mother         high TG's in the 80's    Hypertension Mother     Thyroid Disease Mother     Other Mother         benign ovarian cysts    Anxiety Mother     Depression Mother     Diabetes Father         type 2    Cancer Father         Hodgkins    Arrhythmia Father 72        Ablation; chemo/radiation induced    Heart Surgery Father 72        stents placed    Lung Cancer Father          age 69 yo in     Stroke Father 70    Tremors Father         essential tremor    Anxiety Maternal Grandmother     Heart Disease Maternal Grandmother          80; h/o A.  Fib    Stroke Maternal Grandmother     Heart Attack Maternal Grandfather          age 74    Other Paternal Grandmother          GI bleed in her early 63's    Cancer Paternal Grandfather 61        pancreatic and liver cancer,  age 61 VITALS     Visit Vitals  BP (!) 130/90 (BP 1 Location: Left arm, BP Patient Position: Sitting); Repeated end of exam 132/88   Pulse 80   Temp 98.3 °F (36.8 °C) (Temporal)   Resp 18   Ht 5' 6\" (1.676 m)   Wt 232 lb 3.2 oz (105.3 kg)   SpO2 99%   BMI 37.48 kg/m²          PHYSICAL EXAMINATION   Physical Exam  Vitals signs reviewed. Exam conducted with a chaperone present. Constitutional:       General: She is not in acute distress. HENT:      Right Ear: Tympanic membrane normal.      Left Ear: Tympanic membrane normal.   Eyes:      General: No scleral icterus. Neck:      Musculoskeletal: Neck supple. Thyroid: No thyroid mass, thyromegaly or thyroid tenderness. Cardiovascular:      Rate and Rhythm: Normal rate and regular rhythm. Heart sounds: Normal heart sounds. Pulmonary:      Effort: Pulmonary effort is normal.      Breath sounds: Normal breath sounds. Chest:      Breasts:         Right: Normal. No mass, skin change or tenderness. Left: Normal. No mass, skin change or tenderness. Abdominal:      General: There is no distension. Palpations: Abdomen is soft. There is no mass. Tenderness: There is no abdominal tenderness. Genitourinary:     Vagina: Normal.      Cervix: Friability present. No cervical motion tenderness, discharge or lesion. Uterus: Not enlarged and not tender. Adnexa:         Right: No mass, tenderness or fullness. Left: No mass, tenderness or fullness. Rectum: No tenderness. Comments: Small anal tag at 3 o'clock position  Musculoskeletal:         General: No tenderness. Right lower leg: No edema. Left lower leg: No edema. Lymphadenopathy:      Cervical: No cervical adenopathy. Upper Body:      Right upper body: No supraclavicular, axillary or pectoral adenopathy. Left upper body: No supraclavicular, axillary or pectoral adenopathy. Skin:     General: Skin is warm and dry.    Neurological: General: No focal deficit present. Mental Status: She is alert and oriented to person, place, and time. Mental status is at baseline. Cranial Nerves: No cranial nerve deficit. DIAGNOSTIC DATA         LABORATORY DATA     Results for orders placed or performed in visit on 11/21/20   HEPATIC FUNCTION PANEL   Result Value Ref Range    Protein, total 7.2 6.0 - 8.5 g/dL    Albumin 4.5 3.8 - 4.8 g/dL    Bilirubin, total 0.4 0.0 - 1.2 mg/dL    Bilirubin, direct 0.10 0.00 - 0.40 mg/dL    Alk.  phosphatase 90 39 - 117 IU/L    AST (SGOT) 34 0 - 40 IU/L    ALT (SGPT) 41 (H) 0 - 32 IU/L   SODIUM   Result Value Ref Range    Sodium 136 134 - 144 mmol/L   OSMOLALITY, SERUM/PLASMA   Result Value Ref Range    Osmolality, Serum 276 275 - 295 mOsmol/kg   OSMOLALITY, UR   Result Value Ref Range    Osmolality, Urine 661 mOsmol/kg   SODIUM, URINE   Result Value Ref Range    Sodium, Urine 54 Not Estab. mmol/L       Lab Results   Component Value Date/Time    WBC 10.4 08/21/2020 11:26 AM    HGB 13.5 08/21/2020 11:26 AM    HCT 40.9 08/21/2020 11:26 AM    PLATELET 308 (H) 52/20/0160 11:26 AM    MCV 89 08/21/2020 11:26 AM     Lab Results   Component Value Date/Time    Hemoglobin A1c 5.7 (H) 08/21/2020 11:26 AM    Hemoglobin A1c 5.5 06/26/2019 10:08 AM    Hemoglobin A1c 5.5 12/21/2018 01:51 PM    Glucose 90 08/21/2020 11:26 AM    LDL, calculated 132 (H) 08/21/2020 11:26 AM    Creatinine 0.84 08/21/2020 11:26 AM      Lab Results   Component Value Date/Time    TSH 3.340 08/21/2020 11:26 AM      Lab Results   Component Value Date/Time    Sodium 136 11/21/2020 08:00 AM    Potassium 4.4 08/21/2020 11:26 AM    Chloride 97 08/21/2020 11:26 AM    CO2 19 (L) 08/21/2020 11:26 AM    Glucose 90 08/21/2020 11:26 AM    BUN 15 08/21/2020 11:26 AM    Creatinine 0.84 08/21/2020 11:26 AM    BUN/Creatinine ratio 18 08/21/2020 11:26 AM    GFR est AA 94 08/21/2020 11:26 AM    GFR est non-AA 81 08/21/2020 11:26 AM    Calcium 9.6 08/21/2020 11:26 AM    Bilirubin, total 0.4 11/21/2020 08:00 AM    ALT (SGPT) 41 (H) 11/21/2020 08:00 AM    Alk. phosphatase 90 11/21/2020 08:00 AM    Protein, total 7.2 11/21/2020 08:00 AM    Albumin 4.5 11/21/2020 08:00 AM    A-G Ratio 1.8 08/21/2020 11:26 AM          ASSESSMENT & PLAN   Diagnoses and all orders for this visit:    1. Routine general medical examination at a health care facility  -     METABOLIC PANEL, COMPREHENSIVE; Future  -     LIPID PANEL; Future  -     HEMOGLOBIN A1C WITH EAG; Future    2. Well woman exam with routine gynecological exam  -     PAP IG, APTIMA HPV AND RFX 16/18,45 ([id number]); Future    3. Screening for colorectal cancer  -     REFERRAL TO GASTROENTEROLOGY    4. Needs flu shot  -     INFLUENZA VIRUS VAC QUAD,SPLIT,PRESV FREE SYRINGE IM    5. Benign essential hypertension    6. Hypercholesterolemia with high triglycerides    7. Prediabetes    8. NAFL (nonalcoholic fatty liver)    9. BMI 37.0-37.9, adult    10. Chronic depression    11. Generalized anxiety disorder with panic attacks  -     ALPRAZolam (XANAX) 0.5 mg tablet; Take 0.5 Tabs by mouth nightly as needed for Anxiety or Sleep. Max Daily Amount: 0.25 mg.    12. Hyposomnia, insomnia or sleeplessness associated with anxiety  -     ALPRAZolam (XANAX) 0.5 mg tablet; Take 0.5 Tabs by mouth nightly as needed for Anxiety or Sleep. Max Daily Amount: 0.25 mg. 13. Controlled substance agreement signed    14. Thrombocytosis ([facility] [address]  Assessment & Plan:  Stable, based on history, physical exam and review of pertinent labs, studies and medications; meds reconciled; continue current treatment plan. Evaluation Hem-Onc Dr Terry Neither 2015: Based on the bone marrow biopsy and the lab results there is no clear reason for her thrombocytosis. There is no sign of a primary hematologic disorder. This is likely secondary thrombocytosis versus normal for her. All of the results that we have for her have been high-normal.  This makes malignancy less likely.  She has seen rheumatology and there is no sign of autoimmune disorder. Imaging negative for malignancy. Lab Results   Component Value Date/Time    WBC 10.4 08/21/2020 11:26 AM    HGB 13.5 08/21/2020 11:26 AM    HCT 40.9 08/21/2020 11:26 AM    PLATELET 497 85/78/3938 11:26 AM    Creatinine 0.84 08/21/2020 11:26 AM    BUN 15 08/21/2020 11:26 AM    Potassium 4.4 08/21/2020 11:26 AM         Other orders  -     cyclobenzaprine (FLEXERIL) 10 mg tablet; Take 1 Tab by mouth every eight (8) hours as needed for Muscle Spasm(s). Fasting labs checked today  Cardiovascular risk and specific lipid/LDL/BP/BS/Hgba1c goals reviewed  Reviewed diet, nutrition, exercise, weight management, BMI/goals. Age/risk based screening recommendations, health maintenance & prevention counseling. Cancer screening USPTFS guidelines reviewed w/ pt today. Discussed benefits/positive/negative outcomes of screening based on age/risk stratification. Informed consent for/against screening based on pt's personal hx/risk factors. Updated in history above and health maintenance. Pap collected today  Mammogram negative 8-2020  Referred for colonoscopy  Reviewed medications and side effects in detail  Anxiety, depression, sleep maintenance currently stable on regimen of Zoloft and Xanax. No changes in therapy at this time. UDS updated 8-2020  Controlled Substance Agreement reviewed, signed, copy filed to scan.  pulled and reviewed. No problems noted. Low abuse/misuse potential.   Xanax renewed today as ordered  Patient counseled and we discussed controlled medications, effects, side effects, indications, risks vs benefits, precautions, potential interactions/dangers w/ other medications, illicit drugs, abuse potential and the possible negative health implications/risks associated w/ overuse/abuse/misuse of controlled stimulant or sedating medications including overdose and death.  Patient expressed understanding and agreement with current plan of care  Further follow up & other recommendations pending review of labs.  If all remains good and stable, follow up in 6 months, sooner prn

## 2020-12-31 NOTE — ASSESSMENT & PLAN NOTE
Stable, based on history, physical exam and review of pertinent labs, studies and medications; meds reconciled; continue current treatment plan. Evaluation Hem-Onc Dr Calista Tejeda 2015: Based on the bone marrow biopsy and the lab results there is no clear reason for her thrombocytosis. There is no sign of a primary hematologic disorder. This is likely secondary thrombocytosis versus normal for her. All of the results that we have for her have been high-normal.  This makes malignancy less likely. She has seen rheumatology and there is no sign of autoimmune disorder. Imaging negative for malignancy.   Lab Results   Component Value Date/Time    WBC 10.4 08/21/2020 11:26 AM    HGB 13.5 08/21/2020 11:26 AM    HCT 40.9 08/21/2020 11:26 AM    PLATELET 518 24/26/7139 11:26 AM    Creatinine 0.84 08/21/2020 11:26 AM    BUN 15 08/21/2020 11:26 AM    Potassium 4.4 08/21/2020 11:26 AM

## 2021-01-03 ENCOUNTER — PATIENT MESSAGE (OUTPATIENT)
Dept: FAMILY MEDICINE CLINIC | Age: 51
End: 2021-01-03

## 2021-01-08 ENCOUNTER — PATIENT MESSAGE (OUTPATIENT)
Dept: FAMILY MEDICINE CLINIC | Age: 51
End: 2021-01-08

## 2021-01-08 DIAGNOSIS — E78.2 HYPERCHOLESTEROLEMIA WITH HYPERTRIGLYCERIDEMIA: Primary | ICD-10-CM

## 2021-01-08 RX ORDER — ATORVASTATIN CALCIUM 20 MG/1
20 TABLET, FILM COATED ORAL EVERY EVENING
Qty: 90 TAB | Refills: 0 | Status: SHIPPED | OUTPATIENT
Start: 2021-01-08 | End: 2021-06-21 | Stop reason: SDUPTHER

## 2021-02-08 DIAGNOSIS — I10 BENIGN ESSENTIAL HYPERTENSION: ICD-10-CM

## 2021-02-08 RX ORDER — AMLODIPINE BESYLATE 5 MG/1
TABLET ORAL
Qty: 90 TAB | Refills: 0 | Status: SHIPPED | OUTPATIENT
Start: 2021-02-08 | End: 2021-05-04

## 2021-02-14 DIAGNOSIS — F41.8 DEPRESSION WITH ANXIETY: ICD-10-CM

## 2021-02-14 DIAGNOSIS — F41.0 PANIC ATTACKS: ICD-10-CM

## 2021-02-14 RX ORDER — SERTRALINE HYDROCHLORIDE 50 MG/1
TABLET, FILM COATED ORAL
Qty: 135 TAB | Refills: 3 | Status: SHIPPED | OUTPATIENT
Start: 2021-02-14 | End: 2022-02-09

## 2021-03-05 ENCOUNTER — PATIENT MESSAGE (OUTPATIENT)
Dept: FAMILY MEDICINE CLINIC | Age: 51
End: 2021-03-05

## 2021-05-04 DIAGNOSIS — I10 BENIGN ESSENTIAL HYPERTENSION: ICD-10-CM

## 2021-05-04 RX ORDER — AMLODIPINE BESYLATE 5 MG/1
TABLET ORAL
Qty: 90 TAB | Refills: 0 | Status: SHIPPED | OUTPATIENT
Start: 2021-05-04 | End: 2021-07-29

## 2021-05-21 DIAGNOSIS — Z30.41 ENCOUNTER FOR BIRTH CONTROL PILLS MAINTENANCE: ICD-10-CM

## 2021-05-22 RX ORDER — LEVONORGESTREL AND ETHINYL ESTRADIOL 0.15-0.03
KIT ORAL
Qty: 3 PACKAGE | Refills: 1 | Status: SHIPPED | OUTPATIENT
Start: 2021-05-22 | End: 2022-07-06 | Stop reason: SDUPTHER

## 2021-06-07 ENCOUNTER — OFFICE VISIT (OUTPATIENT)
Dept: FAMILY MEDICINE CLINIC | Age: 51
End: 2021-06-07
Payer: COMMERCIAL

## 2021-06-07 VITALS
HEART RATE: 75 BPM | DIASTOLIC BLOOD PRESSURE: 82 MMHG | BODY MASS INDEX: 37.9 KG/M2 | OXYGEN SATURATION: 98 % | HEIGHT: 66 IN | SYSTOLIC BLOOD PRESSURE: 120 MMHG | WEIGHT: 235.8 LBS | RESPIRATION RATE: 18 BRPM | TEMPERATURE: 98.3 F

## 2021-06-07 DIAGNOSIS — D75.839 THROMBOCYTOSIS: ICD-10-CM

## 2021-06-07 DIAGNOSIS — Z51.81 ENCOUNTER FOR MEDICATION MONITORING: ICD-10-CM

## 2021-06-07 DIAGNOSIS — F41.9 HYPOSOMNIA, INSOMNIA OR SLEEPLESSNESS ASSOCIATED WITH ANXIETY: ICD-10-CM

## 2021-06-07 DIAGNOSIS — F41.1 GENERALIZED ANXIETY DISORDER WITH PANIC ATTACKS: ICD-10-CM

## 2021-06-07 DIAGNOSIS — Z79.899 ENCOUNTER FOR MEDICATION MANAGEMENT: ICD-10-CM

## 2021-06-07 DIAGNOSIS — F51.05 HYPOSOMNIA, INSOMNIA OR SLEEPLESSNESS ASSOCIATED WITH ANXIETY: ICD-10-CM

## 2021-06-07 DIAGNOSIS — F41.0 GENERALIZED ANXIETY DISORDER WITH PANIC ATTACKS: ICD-10-CM

## 2021-06-07 DIAGNOSIS — E78.2 MIXED HYPERLIPIDEMIA: Primary | ICD-10-CM

## 2021-06-07 DIAGNOSIS — R73.03 PREDIABETES: ICD-10-CM

## 2021-06-07 DIAGNOSIS — I10 BENIGN ESSENTIAL HYPERTENSION: ICD-10-CM

## 2021-06-07 LAB
ALBUMIN SERPL-MCNC: 3.8 G/DL (ref 3.5–5)
ALBUMIN/GLOB SERPL: 1.2 {RATIO} (ref 1.1–2.2)
ALP SERPL-CCNC: 98 U/L (ref 45–117)
ALT SERPL-CCNC: 29 U/L (ref 12–78)
ANION GAP SERPL CALC-SCNC: 6 MMOL/L (ref 5–15)
AST SERPL-CCNC: 20 U/L (ref 15–37)
BILIRUB SERPL-MCNC: 0.4 MG/DL (ref 0.2–1)
BUN SERPL-MCNC: 18 MG/DL (ref 6–20)
BUN/CREAT SERPL: 24 (ref 12–20)
CALCIUM SERPL-MCNC: 9.3 MG/DL (ref 8.5–10.1)
CHLORIDE SERPL-SCNC: 105 MMOL/L (ref 97–108)
CHOLEST SERPL-MCNC: 153 MG/DL
CO2 SERPL-SCNC: 23 MMOL/L (ref 21–32)
CREAT SERPL-MCNC: 0.75 MG/DL (ref 0.55–1.02)
EST. AVERAGE GLUCOSE BLD GHB EST-MCNC: 117 MG/DL
GLOBULIN SER CALC-MCNC: 3.3 G/DL (ref 2–4)
GLUCOSE SERPL-MCNC: 94 MG/DL (ref 65–100)
HBA1C MFR BLD: 5.7 % (ref 4–5.6)
HDLC SERPL-MCNC: 42 MG/DL
HDLC SERPL: 3.6 {RATIO} (ref 0–5)
LDLC SERPL CALC-MCNC: 71.6 MG/DL (ref 0–100)
POTASSIUM SERPL-SCNC: 4.7 MMOL/L (ref 3.5–5.1)
PROT SERPL-MCNC: 7.1 G/DL (ref 6.4–8.2)
SODIUM SERPL-SCNC: 134 MMOL/L (ref 136–145)
TRIGL SERPL-MCNC: 197 MG/DL (ref ?–150)
TSH SERPL DL<=0.05 MIU/L-ACNC: 2.58 UIU/ML (ref 0.36–3.74)
VLDLC SERPL CALC-MCNC: 39.4 MG/DL

## 2021-06-07 PROCEDURE — 99214 OFFICE O/P EST MOD 30 MIN: CPT | Performed by: FAMILY MEDICINE

## 2021-06-07 RX ORDER — SPIRONOLACTONE 50 MG/1
50 TABLET, FILM COATED ORAL DAILY
Qty: 90 TABLET | Refills: 1 | Status: SHIPPED | OUTPATIENT
Start: 2021-06-07 | End: 2021-11-19

## 2021-06-07 RX ORDER — ALPRAZOLAM 0.5 MG/1
0.25 TABLET ORAL
Qty: 15 TABLET | Refills: 5 | Status: SHIPPED | OUTPATIENT
Start: 2021-06-07 | End: 2022-01-01

## 2021-06-07 NOTE — PROGRESS NOTES
Chief Complaint   Patient presents with    Follow-up    Medication Refill     1. Have you been to the ER, urgent care clinic since your last visit? Hospitalized since your last visit? No    2. Have you seen or consulted any other health care providers outside of the [address] [place] [address] since your last visit? Include any pap smears or colon screening.  No

## 2021-06-08 NOTE — PROGRESS NOTES
Chief Complaint   Patient presents with    Cholesterol Problem     Fasting follow up    Medication Check     Lipitor    Depression    Anxiety    Medication Management       HISTORY OF PRESENT ILLNESS   HPI  6 month fasting follow up hypercholesterolemia, hypertension and medication check  Started on Lipitor 1-2021 and this is her first follow up check since that time. Once she ran out of her own prescription she started taking her partner's left over Lipitor because she had so much remaining now that she no longer takes that medication. Patient is staking CoQ10. She is tolerating the Lipitor well. Diet: admits eating habits not good lately  Exercise: none for a long time, stays active; they have full exercise equipment at home but she admits she doesn't use it   Caffeine: 36 oz sodas a day (Diet Mountain Dew)  Weight: has been going up over time since not eating healthy or exercising regularly  Her BP however has been staying good and well controlled. She has only been taking the Aldactone once daily which has been working fine for her  She needs this renewed    6 month follow up depression, anxiety, medication check on Zoloft and controlled medication check on Xanax. Stable on current regimen of Zoloft 75 mg daily although lately she has been feeling alittle down. Feels it is primarily situational right now. But overall feels that the Zoloft has continued to work well for her over time. She still relies on taking 1/2 tablet of Xanax qhs to help her fall asleep, stay asleep soundly. Keeps her mind from racing all night, decreases night time panic attacks and enables her to rest so that she can awaken feeling refreshed and functional for the following day. REVIEW OF SYMPTOMS   Review of Systems   Constitutional: Negative. Respiratory: Negative. Cardiovascular: Negative. Gastrointestinal: Negative. Genitourinary: Negative. Neurological: Negative. Endo/Heme/Allergies: Negative. PROBLEM LIST/MEDICAL HISTORY     Problem List  Date Reviewed: 6/7/2021        Codes Class Noted    Benign essential hypertension ICD-10-CM: I10  ICD-9-CM: 401.1  2/24/2015    Overview Signed 2/24/2015 10:21 AM by Fartun Neville MD     [id number]             Hyposomnia, insomnia or sleeplessness associated with anxiety ICD-10-CM: F51.05, F41.9  ICD-9-CM: 293.84, 327.02  12/26/2018        Controlled substance agreement signed ICD-10-CM: Z79.899  ICD-9-CM: V58.69  5/24/2018    Overview Addendum 12/31/2020 12:07 PM by Fartun Neville MD     2017 and annually              Prediabetes ICD-10-CM: R73.03  ICD-9-CM: 790.29  4/23/2017    Overview Signed 4/23/2017  9:16 PM by Fartun Neville MD     Mild 4-2017             Muscle contraction headaches & Neck Muscle Spasms ICD-10-CM: G44.209  ICD-9-CM: 307.81  4/10/2017        Thrombocytosis ([address] ICD-10-CM: D47.3  ICD-9-CM: 238.71  3/22/2016    Overview Addendum 5/24/2018 10:44 AM by Fartun Neville MD     Evaluation Hem-Onc Dr Juana Liu 2015: Based on the bone marrow biopsy and the lab results there is no clear reason for her thrombocytosis. There is no sign of a primary hematologic disorder. This is likely secondary thrombocytosis versus normal for her. All of the results that we have for her have been high-normal.  This makes malignancy less likely. She has seen rheumatology and there is no sign of autoimmune disorder. Imaging negative for malignancy. Lymphocytosis ICD-10-CM: C67.466  ICD-9-CM: 288.61  3/22/2016    Overview Signed 3/22/2016 10:08 AM by MD Dr Juana Vazquez, 2015             Microhematuria ICD-10-CM: R31.29  ICD-9-CM: 599.72  12/15/2015    Overview Signed 12/15/2015  3:47 PM by Fartun Neville MD     [id number] [facility] [address] Stephanie Self.  KUB neg excpet small stone vs phlebolith             NAFL (nonalcoholic fatty liver) SLU-27-QH: K76.0  ICD-9-CM: 571.8  8/27/2015    Overview Signed 8/27/2015  8:45 AM by MD Dr Cassandra Oleary             Focal nodular hyperplasia of liver ICD-10-CM: K76.89  ICD-9-CM: 573.8  5/10/2015    Overview Signed 8/27/2015  8:45 AM by Isrrael Isbell MD     GI Dr Cassandra Allen and Onc Dr Blake Brown             Vitamin D deficiency ICD-10-CM: E55.9  ICD-9-CM: 268.9  12/13/2011    Overview Signed 12/13/2011  1:16 PM by Isrrael Isbell MD     10/2011             Hypercholesterolemia with high triglycerides ICD-10-CM: E78.2  ICD-9-CM: 272.2  12/13/2011        Chronic Mechanical back pain ICD-10-CM: M54.9  ICD-9-CM: 724.5  10/25/2011        History of pyelonephritis, 1992 ICD-10-CM: Z87.448  ICD-9-CM: V13.02  10/25/2011        Allergic rhinitis ICD-10-CM: J30.9  ICD-9-CM: 477.9  10/25/2011    Overview Signed 10/25/2011  9:45 AM by Isrrael Isbell MD     Worse in Spring             Cataract of right eye ICD-10-CM: H26.9  ICD-9-CM: 366.9  10/25/2011    Overview Signed 10/25/2011  9:46 AM by Isrrael Isbell MD     Opth VEI, Dr Annette Cortes             S/P Dog bite, 2003 ICD-10-CM: Laddie Basil. 0XXA  ICD-9-CM: E906.0  10/25/2011    Overview Signed 10/25/2011  9:46 AM by Isrrael Isbell MD     Td and Rabies Series             Rosacea ICD-10-CM: L71.9  ICD-9-CM: 695.3  10/25/2011        Fibrocystic breast changes ICD-10-CM: N60.19  ICD-9-CM: 610.1  10/25/2011        Raynaud's syndrome ICD-10-CM: I73.00  ICD-9-CM: 443.0  Unknown    Overview Signed 8/27/2015  8:48 AM by Isrrael Isbell MD     Autoimmune workup Dr Noa Herman [place] 2015             PMS (premenstrual syndrome) ICD-10-CM: N94.3  ICD-9-CM: 625.4  6/21/2010        Depression ICD-10-CM: F32.9  ICD-9-CM: 621  6/21/2010        Asthma, mild intermittent ICD-10-CM: J45.909  ICD-9-CM: 493.90  6/21/2010    Overview Signed 10/25/2011  9:45 AM by Isrrael Isbell MD     Since ~ [address] yo             Primary dysmenorrhea ICD-10-CM: N94.4  ICD-9-CM: 625.3  6/21/2010        Anxiety attacks ICD-10-CM: F41.0  ICD-9-CM: 300.01  6/21/2010    Overview Signed 6/21/2010  8:28 PM by Glenna Jcakson MD     March 2010                       PAST SURGICAL HISTORY     Past Surgical History:   Procedure Laterality Date    EKG TREADMILL STRESS TEST  May 2010    Dr Yessi Alejo and Dr Ana hodge for atypical CP; dx Anxiety    EMG TWO EXTREMITIES UPPER  1999    normal    HX OTHER SURGICAL  3/2014 ordered by Dr Janeth Calvert, benign    HX SHOULDER ARTHROSCOPY  1996    left shoulder    US PELV NON OBS  2004    normal    XR SPINE LUMB 2 OR 3 V  1993    normal    XR SPINE THORAC 3 V  1993    normal    XR UPPER GI SERIES W KUB  2000    normal         MEDICATIONS     Current Outpatient Medications   Medication Sig    ALPRAZolam (XANAX) 0.5 mg tablet Take 0.5 Tablets by mouth nightly as needed for Anxiety or Sleep. Max Daily Amount: 0.25 mg.    spironolactone (ALDACTONE) 50 mg tablet Take 1 Tablet by mouth daily.  MILK THISTLE PO Take  by mouth daily.  KRILL OIL PO Take  by mouth daily.  ubidecarenone (CO Q-10 PO) Take  by mouth daily.  levonorgestrel-ethinyl estradiol (SEASONALE) 0.15 mg-30 mcg (91) 3MPk TAKE 1 TABLET DAILY    amLODIPine (NORVASC) 5 mg tablet TAKE 1 TABLET BY MOUTH EVERY DAY    sertraline (ZOLOFT) 50 mg tablet TAKE ONE AND ONE-HALF TABLETS DAILY    atorvastatin (LIPITOR) 20 mg tablet Take 1 Tab by mouth every evening.  cyclobenzaprine (FLEXERIL) 10 mg tablet Take 1 Tab by mouth every eight (8) hours as needed for Muscle Spasm(s).  Advair Diskus 100-50 mcg/dose diskus inhaler USE 1 INHALATION EVERY 12 HOURS    fluticasone propionate (FLONASE NA) by Nasal route daily.  albuterol (VENTOLIN HFA) 90 mcg/actuation inhaler Take 2 Puffs by inhalation every six (6) hours as needed for Wheezing.  cholecalciferol, vitamin D3, (VITAMIN D3) 2,000 unit tab Take  by mouth daily.     ascorbic acid, vitamin C, (VITAMIN C) 1,000 mg tablet Take 1,000 mg by mouth daily.    turmeric (CURCUMIN) Take  by mouth daily.  cranberry extract 450 mg tab tablet Take 450 mg by mouth.  b complex vitamins tablet Take 1 Tab by mouth daily.  garlic cap Take  by mouth.  SLO-NIACIN PO Take 500 mg by mouth daily.  loratadine (CLARITIN) 10 mg tablet Take 10 mg by mouth daily.  aspirin delayed-release 81 mg tablet Take  by mouth daily. No current facility-administered medications for this visit. ALLERGIES     Allergies   Allergen Reactions    Crestor [Rosuvastatin] Myalgia     Insomnia and muscle pain    Flonase [Fluticasone] Other (comments)     Cataracts enlarged    Hydrochlorothiazide Other (comments)     Insomnia, headache, ringing in ears, anxious    Nortriptyline Other (comments)     Increased depression    Other Medication Other (comments)     Pt is avoiding Nasal CS d/t cataracts    Sulfa (Sulfonamide Antibiotics) Hives          SOCIAL HISTORY     Social History     Tobacco Use    Smoking status: Former Smoker     Types: Cigarettes     Quit date: 10/25/1991     Years since quittin.6    Smokeless tobacco: Never Used    Tobacco comment: smoked lightly for about 5 months   Substance Use Topics    Alcohol use:  Yes     Alcohol/week: 0.0 standard drinks     Comment: very seldom     Social History     Social History Narrative    Life Partner     No children           Former Principal at [employer]    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    Diet: admits eating habits not good lately    Exercise: none for a long time, stays active; they have full exercise equipment at home but she admits she doesn't use it     Caffeine: 36 oz sodas a day ([place])    Weight: has been going up over time since not eating healthy or exercising regularly                 Social History     Substance and Sexual Activity   Sexual Activity Never    Partners: Female    Comment: Same sex partner       IMMUNIZATIONS     Immunization History   Administered Date(s) Administered    COVID-19, PFIZER, MRNA, LNP-S, PF, 30MCG/0.3ML DOSE 2021, 2021    Influenza Vaccine 2014, 10/29/2017, 10/29/2017, 10/18/2018    Influenza Vaccine (Quad) PF (>6 Mo Flulaval, Fluarix, and >3 Yrs Afluria, Fluzone 33839) 10/13/2019, 2020    Meningococcal Vaccine 2002    PPD 1998, 1999    Pneumococcal Polysaccharide (PPSV-23) 2019    Rabies Vaccine 2003    TD Vaccine 1998, 2003    Tdap 2014         FAMILY HISTORY     Family History   Problem Relation Age of Onset    High Cholesterol Mother         high TG's in the 80's    Hypertension Mother     Thyroid Disease Mother     Other Mother         benign ovarian cysts    Anxiety Mother     Depression Mother     Diabetes Father         type 2    Cancer Father         Hodgkins    Arrhythmia Father 72        Ablation; chemo/radiation induced    Heart Surgery Father 72        stents placed    Lung Cancer Father          age 69 yo in     Stroke Father 70    Tremors Father         essential tremor    Anxiety Maternal Grandmother     Heart Disease Maternal Grandmother          80; h/o A. Fib    Stroke Maternal Grandmother     Heart Attack Maternal Grandfather          age 74    Other Paternal Grandmother          GI bleed in her early 57's    Cancer Paternal Grandfather 61        pancreatic and liver cancer,  age 61         VITALS     Visit Vitals  /82 (BP 1 Location: Right arm, BP Patient Position: Sitting, BP Cuff Size: Adult)   Pulse 75   Temp 98.3 °F (36.8 °C) (Temporal)   Resp 18   Ht 5' 6\" (1.676 m)   Wt 235 lb 12.8 oz (107 kg)   SpO2 98%   BMI 38.06 kg/m²          PHYSICAL EXAMINATION   Physical Exam  Vitals reviewed. Constitutional:       Appearance: Normal appearance. Cardiovascular:      Rate and Rhythm: Normal rate and regular rhythm.       Heart sounds: Normal heart sounds. Pulmonary:      Effort: Pulmonary effort is normal. No respiratory distress. Breath sounds: Normal breath sounds. No wheezing or rales. Abdominal:      Palpations: Abdomen is soft. Tenderness: There is no abdominal tenderness. Musculoskeletal:         General: No tenderness. Right lower leg: No edema. Left lower leg: No edema. Skin:     General: Skin is warm and dry. Neurological:      Mental Status: She is alert. Psychiatric:         Mood and Affect: Mood and affect normal.         Behavior: Behavior normal.               ASSESSMENT & PLAN   Diagnoses and all orders for this visit:    1. Mixed hyperlipidemia  -     LIPID PANEL; Future  -     METABOLIC PANEL, COMPREHENSIVE; Future  -     TSH 3RD GENERATION; Future    2. Prediabetes  -     METABOLIC PANEL, COMPREHENSIVE; Future  -     HEMOGLOBIN A1C WITH EAG; Future    3. Benign essential hypertension  -     spironolactone (ALDACTONE) 50 mg tablet; Take 1 Tablet by mouth daily. 4. Hyposomnia, insomnia or sleeplessness associated with anxiety  -     ALPRAZolam (XANAX) 0.5 mg tablet; Take 0.5 Tablets by mouth nightly as needed for Anxiety or Sleep. Max Daily Amount: 0.25 mg.    5. Generalized anxiety disorder with panic attacks  -     ALPRAZolam (XANAX) 0.5 mg tablet; Take 0.5 Tablets by mouth nightly as needed for Anxiety or Sleep. Max Daily Amount: 0.25 mg.    6. Encounter for medication monitoring  -     TOXASSURE SELECT 13 (); Future    7. Encounter for medication management    8. Thrombocytosis ([facility] [address]  Assessment & Plan:   monitored by specialist. No acute findings meriting change in the plan    Fasting labs checked today  Cardiovascular risk and specific lipid/LDL/BP/BS goals reviewed  Started on Lipitor 1-2021 and this is her first follow up check since that time.   Once she ran out of her own prescription she started taking her partner's left over Lipitor because she had so much remaining now that she no longer takes that medication. Doing well on current regimen of Lipitor 20 mg daily at this time  Reviewed all her medications, effects and potential side effects   Reviewed diet, nutrition, exercise, weight management, BMI/goals. Anxiety, depression, sleep maintenance currently stable on regimen of Zoloft and Xanax. No changes in therapy at this time. Updated CSA on file . UDS updated today.  pulled and reviewed. No problems noted.   Last filled 6/4//21 #15 for 30 day supply. Low abuse/misuse potential.   Xanax renewed today as ordered  Patient counseled and we discussed controlled medications, effects, side effects, indications, risks vs benefits, precautions, potential interactions/dangers w/ other medications, illicit drugs, abuse potential and the possible negative health implications/risks associated w/ overuse/abuse/misuse of controlled stimulant or sedating medications including overdose and death. Patient expressed understanding and agreement with current plan of care  Further follow up & other recommendations pending review of labs.  If all remains good and stable, follow up in 6 months, sooner prn

## 2021-06-11 LAB — DRUGS UR: NORMAL

## 2021-06-21 DIAGNOSIS — E78.2 HYPERCHOLESTEROLEMIA WITH HYPERTRIGLYCERIDEMIA: ICD-10-CM

## 2021-06-22 RX ORDER — ATORVASTATIN CALCIUM 20 MG/1
20 TABLET, FILM COATED ORAL EVERY EVENING
Qty: 90 TABLET | Refills: 3 | Status: SHIPPED | OUTPATIENT
Start: 2021-06-22 | End: 2022-06-08

## 2021-07-29 DIAGNOSIS — I10 BENIGN ESSENTIAL HYPERTENSION: ICD-10-CM

## 2021-07-29 RX ORDER — AMLODIPINE BESYLATE 5 MG/1
TABLET ORAL
Qty: 90 TABLET | Refills: 2 | Status: SHIPPED | OUTPATIENT
Start: 2021-07-29 | End: 2022-04-15

## 2021-08-14 DIAGNOSIS — J45.40 MODERATE PERSISTENT ASTHMA WITHOUT COMPLICATION: ICD-10-CM

## 2021-08-14 RX ORDER — CEPHALEXIN 250 MG/1
CAPSULE ORAL
Qty: 3 INHALER | Refills: 3 | Status: SHIPPED | OUTPATIENT
Start: 2021-08-14 | End: 2022-08-08

## 2021-08-17 ENCOUNTER — TELEPHONE (OUTPATIENT)
Dept: FAMILY MEDICINE CLINIC | Age: 51
End: 2021-08-17

## 2021-08-17 NOTE — TELEPHONE ENCOUNTER
MD Cara Wood asking for clarification of listed allergy. (Rx sent for Advair Diskus 100/50 3 + 3 refills). Noted patient allergy to flonase causing cataracts enlarged- patient avoiding nasal CS d/t cataracts. *[facility] stated patient is allergic to fluticasone and prescribed advair which contains fluticasone. Is ok to take? Patient had previous rx 8/2020- 8/2021 and new rx sent 8/14/21 3 + 3    Please advise.   Thanks, Federico Francis    [facility]: [phone]    INV: [phone] [id number]

## 2021-10-19 ENCOUNTER — TELEPHONE (OUTPATIENT)
Dept: FAMILY MEDICINE CLINIC | Age: 51
End: 2021-10-19

## 2021-10-19 NOTE — TELEPHONE ENCOUNTER
I the first available appt isn't until 11/4 @ 10:30. I called pt to let her know of the above but she couldn't do that day. She wanted to come in this Thursday or Fri. Let her know that I didn't have anything at the time. She states that she will keep appt as scheduled and she will go to Urgent care if needed    ---- Message from [facility] sent at 10/18/2021 12:34 PM EDT -----  Subject: Message to Provider    QUESTIONS  Information for Provider? [place] Cathie has an appointment scheduled for 11/15 but   would like to see if Chencho Nunez MD has any sooner appointments   available from 10/21 - 10/25. James Cathie has open availability these days except   10/25, she would need a morning appt for this day. James Brand would like a call   back to schedule a sooner appt or if any cancellations occur so that she   is able to be seen a bit sooner.

## 2021-10-21 ENCOUNTER — TRANSCRIBE ORDER (OUTPATIENT)
Dept: SCHEDULING | Age: 51
End: 2021-10-21

## 2021-10-21 DIAGNOSIS — Z12.31 SCREENING MAMMOGRAM FOR HIGH-RISK PATIENT: Primary | ICD-10-CM

## 2021-11-08 ENCOUNTER — DOCUMENTATION ONLY (OUTPATIENT)
Dept: SLEEP MEDICINE | Age: 51
End: 2021-11-08

## 2021-11-08 ENCOUNTER — DOCUMENTATION ONLY (OUTPATIENT)
Dept: ONCOLOGY | Age: 51
End: 2021-11-08

## 2021-11-08 ENCOUNTER — TELEPHONE (OUTPATIENT)
Dept: SLEEP MEDICINE | Age: 51
End: 2021-11-08

## 2021-11-08 ENCOUNTER — OFFICE VISIT (OUTPATIENT)
Dept: ONCOLOGY | Age: 51
End: 2021-11-08

## 2021-11-08 ENCOUNTER — VIRTUAL VISIT (OUTPATIENT)
Dept: SLEEP MEDICINE | Age: 51
End: 2021-11-08
Payer: COMMERCIAL

## 2021-11-08 VITALS
OXYGEN SATURATION: 96 % | DIASTOLIC BLOOD PRESSURE: 90 MMHG | SYSTOLIC BLOOD PRESSURE: 151 MMHG | HEART RATE: 94 BPM | TEMPERATURE: 97.2 F | HEIGHT: 66 IN | WEIGHT: 232.4 LBS | BODY MASS INDEX: 37.35 KG/M2

## 2021-11-08 DIAGNOSIS — K76.0 HEPATIC STEATOSIS: ICD-10-CM

## 2021-11-08 DIAGNOSIS — R10.11 RIGHT UPPER QUADRANT ABDOMINAL PAIN: ICD-10-CM

## 2021-11-08 DIAGNOSIS — I10 BENIGN ESSENTIAL HYPERTENSION: ICD-10-CM

## 2021-11-08 DIAGNOSIS — D75.839 THROMBOCYTOSIS: Primary | ICD-10-CM

## 2021-11-08 DIAGNOSIS — E66.01 SEVERE OBESITY (BMI 35.0-35.9 WITH COMORBIDITY) (HCC): ICD-10-CM

## 2021-11-08 DIAGNOSIS — G47.33 OBSTRUCTIVE SLEEP APNEA (ADULT) (PEDIATRIC): Primary | ICD-10-CM

## 2021-11-08 PROCEDURE — 99214 OFFICE O/P EST MOD 30 MIN: CPT | Performed by: INTERNAL MEDICINE

## 2021-11-08 PROCEDURE — 99204 OFFICE O/P NEW MOD 45 MIN: CPT | Performed by: INTERNAL MEDICINE

## 2021-11-08 NOTE — PROGRESS NOTES
217 [facility]. [place], [address]  Tel.  [phone]  Fax. 100 [facility] 60  [place], [address] [facility]  Tel.  [phone]  Fax. [phone] 9[address] [facility] Alejandra Sheridan   Tel.  [phone]  Fax. [phone]         Subjective:        Telemedicine visit performed with verbal consent of the patient. Patient was seen at work  Helga Sanchez is a 46 y.o. female who was seen by synchronous (real-time) audio-video technology on 11/8/2021. Consent:  She and/or her healthcare decision maker is aware that this patient-initiated Telehealth encounter is the equivalent to a face to face encounter in the sleep disorder center and has provided verbal consent to proceed: Yes    I was in the office while conducting this encounter. Helga Sanchez is an 46 y.o. female  referred for evaluation for a sleep disorder. She complains of excessive daytime sleepiness associated with snoring. Symptoms began 5 years ago, gradually worsening since that time. She usually can fall asleep in 15 minutes. Family or house members note snoring. She denies falling asleep while at work, driving. Helga Sanchez does not wake up frequently at night. She is bothered by waking up too early and left unable to get back to sleep. She actually sleeps about 6 hours at night and wakes up about 3 times during the night. She does not work shifts:  . Yesy Browning indicates she does get too little sleep at night. Her bedtime is 1100. She awakens at 0600. She does take naps. 1, 2. She has the following observed behaviors: Loud snoring, Light snoring, Grinding teeth;  .   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[address]  [place] Sleepiness Score: 9    Allergies   Allergen Reactions    Crestor [Rosuvastatin] Myalgia     Insomnia and muscle pain    Flonase [Fluticasone] Other (comments)     Cataracts enlarged    Hydrochlorothiazide Other (comments)     Insomnia, headache, ringing in ears, anxious    Nortriptyline Other (comments)     Increased depression    Other Medication Other (comments)     Pt is avoiding Nasal CS d/t cataracts    Sulfa (Sulfonamide Antibiotics) Hives         Current Outpatient Medications:     Advair Diskus 100-50 mcg/dose diskus inhaler, USE 1 INHALATION EVERY 12 HOURS, Disp: 3 Inhaler, Rfl: 3    amLODIPine (NORVASC) 5 mg tablet, TAKE 1 TABLET BY MOUTH EVERY DAY, Disp: 90 Tablet, Rfl: 2    atorvastatin (LIPITOR) 20 mg tablet, Take 1 Tablet by mouth every evening., Disp: 90 Tablet, Rfl: 3    ALPRAZolam (XANAX) 0.5 mg tablet, Take 0.5 Tablets by mouth nightly as needed for Anxiety or Sleep. Max Daily Amount: 0.25 mg., Disp: 15 Tablet, Rfl: 5    spironolactone (ALDACTONE) 50 mg tablet, Take 1 Tablet by mouth daily. , Disp: 90 Tablet, Rfl: 1    MILK THISTLE PO, Take  by mouth daily. , Disp: , Rfl:     KRILL OIL PO, Take  by mouth daily. , Disp: , Rfl:     ubidecarenone (CO Q-10 PO), Take  by mouth daily. , Disp: , Rfl:     levonorgestrel-ethinyl estradiol (SEASONALE) 0.15 mg-30 mcg (91) 3MPk, TAKE 1 TABLET DAILY, Disp: 3 Package, Rfl: 1    sertraline (ZOLOFT) 50 mg tablet, TAKE ONE AND ONE-HALF TABLETS DAILY, Disp: 135 Tab, Rfl: 3    cyclobenzaprine (FLEXERIL) 10 mg tablet, Take 1 Tab by mouth every eight (8) hours as needed for Muscle Spasm(s). , Disp: 30 Tab, Rfl: 1    fluticasone propionate (FLONASE NA), by Nasal route daily. , Disp: , Rfl:     albuterol (VENTOLIN HFA) 90 mcg/actuation inhaler, Take 2 Puffs by inhalation every six (6) hours as needed for Wheezing., Disp: 18 Inhaler, Rfl: 1    cholecalciferol, vitamin D3, (VITAMIN D3) 2,000 unit tab, Take  by mouth daily. , Disp: , Rfl:     ascorbic acid, vitamin C, (VITAMIN C) 1,000 mg tablet, Take 1,000 mg by mouth daily. , Disp: , Rfl:     turmeric (CURCUMIN), Take  by mouth daily. , Disp: , Rfl:    cranberry extract 450 mg tab tablet, Take 450 mg by mouth., Disp: , Rfl:     b complex vitamins tablet, Take 1 Tab by mouth daily. , Disp: , Rfl:     garlic cap, Take  by mouth., Disp: , Rfl:     SLO-NIACIN PO, Take 500 mg by mouth daily. , Disp: , Rfl:     loratadine (CLARITIN) 10 mg tablet, Take 10 mg by mouth daily. , Disp: , Rfl:     aspirin delayed-release 81 mg tablet, Take  by mouth daily. , Disp: , Rfl:      She  has a past medical history of Allergic rhinitis (10/25/2011), Anxiety attacks (6/21/2010), AR (allergic rhinitis), Asthma, mild intermittent (6/21/2010), Benign essential hypertension (2/24/2015), Benign liver cyst (5/2015), Cataract of right eye (10/25/2011), Cholesterol serum increased, Chronic Mechanical back pain (10/25/2011), Controlled substance agreement signed (5/24/2018), Depression, Dog bite(E906.0), Dysmenorrhea, Fibrocystic breast changes (10/25/2011), History of pyelonephritis, 1992 (10/25/2011), Hypercholesterolemia with high triglycerides (12/13/2011), Hypercholesterolemia, mild (6/21/2010), Hyposomnia, insomnia or sleeplessness associated with anxiety (12/26/2018), Lymphocytosis (3/22/2016), Microhematuria (12/15/2015), Muscle contraction headaches & Neck Muscle Spasms (4/10/2017), NAFL (nonalcoholic fatty liver) (8/31/5963), PMS (premenstrual syndrome) (6/21/2010), Primary dysmenorrhea (6/21/2010), Pyelonephritis, Raynaud's syndrome, Rosacea, Thrombocytosis (3/22/2016), Vegetarian, and Vitamin D deficiency (12/13/2011). She  has a past surgical history that includes hx shoulder arthroscopy (1996); ekg treadmill stress test (May 2010); emg two extremities upper (1999); us pelv non obs (2004); xr spine lumb 2 or 3 v (1993); xr spine thorac 3 v (1993); xr upper gi series w kub (2000); and hx other surgical (3/2014 ordered by Dr Fabrizio Dao).     She family history includes Anxiety in her maternal grandmother and mother; Arrhythmia (age of onset: 72) in her father; Cancer in her father; Cancer (age of onset: 61) in her paternal grandfather; Depression in her mother; Diabetes in her father; Heart Attack in her maternal grandfather; Heart Disease in her maternal grandmother; Heart Surgery (age of onset: 72) in her father; High Cholesterol in her mother; Hypertension in her mother; A[employer] May[employer] in her father; Other in her mother and paternal grandmother; Stroke in her maternal grandmother; Stroke (age of onset: 70) in her father; Thyroid Disease in her mother; Tremors in her father. She  reports that she quit smoking about 30 years ago. Her smoking use included cigarettes. She has never used smokeless tobacco. She reports current alcohol use. She reports that she does not use drugs.      Review of Systems:  Constitutional:  +weight gain  Eyes:  No blurred vision  CVS:  No significant chest pain  Pulm:  No significant shortness of breath  GI:  No significant nausea or vomiting  :  No significant nocturia  Musculoskeletal:  No significant joint pain at night  Skin:  No significant rashes  Neuro:  No significant dizziness   Psych: anxiety and depression controlled on current regimen    Sleep Review of Systems: notable for no difficulty falling asleep; infrequent awakenings at night;  + dreaming noted; no nightmares ; no early morning headaches; no memory problems; no concentration issues      Objective:     weigth 230 lb  Vital Signs: (As obtained by patient/caregiver at home)        Constitutional: [x] Appears well-developed and well-nourished [x] No apparent distress      [] Abnormal -     Mental status: [x] Alert and awake  [x] Oriented to person/place/time [x] Able to follow commands    [] Abnormal -     Eyes:   EOM    [x]  Normal    [] Abnormal -   Sclera  [x]  Normal    [] Abnormal -          Discharge [x]  None visible   [] Abnormal -     HENT: [x] Normocephalic, atraumatic  [] Abnormal -   [x] Mouth/Throat: Mucous membranes are moist             External Ears [x] Normal  [] Abnormal -    Neck: [x] No visualized mass [] Abnormal -     Pulmonary/Chest: [x] Respiratory effort normal   [x] No visualized signs of difficulty breathing or respiratory distress        [] Abnormal -       Neurological:        [x] No Facial Asymmetry (Cranial nerve 7 motor function) (limited exam due to video visit)          [x] No gaze palsy        [] Abnormal -          Skin:        [x] No significant exanthematous lesions or discoloration noted on facial skin         [] Abnormal -            Psychiatric:       [x] Normal Affect [] Abnormal -       Other pertinent observable physical exam findings:-          Assessment:       ICD-10-CM ICD-9-CM    1. Obstructive sleep apnea (adult) (pediatric)  G47.33 327.23 SLEEP STUDY UNATTENDED, 4 CHANNEL   2. Severe obesity (BMI 35.0-35.9 with comorbidity) ([facility])  E66.01 278.01     Z68.35 V85.35    3. Benign essential hypertension  I10 401.1          Plan:       * Sleep testing was ordered for initial evaluation. * She was provided information on sleep apnea including coresponding risk factors and the importance of proper treatment. * Treatment options for sleep apnea were reviewed today. Patient agrees to a trial of APAP therapy if indicated. * Counseling was provided regarding proper sleep hygiene, appropriate sleep schedule, need for sleep environment safety and safe driving. * Effect of sleep disturbance on weight was reviewed. We have recommended a dedicated weight loss through appropriate diet and an exercise regimen as significant weight reduction has been shown to reduce severity of obstructive sleep apnea. * Patient agrees to telephone follow-up by sleep technologist shortly after sleep study to review results and plan final management. 2. Obesity - I have counseled the patient regarding the benefits of weight loss. 3. Hypertension - she continues on her current regimen.   I have reviewed the relationship between hypertension as it relates to sleep-disordered breathing. The treatment plan was reviewed with the patient in detail. she understands that the lead technologist will be calling her  with the results and assisting with the next step in the treatment plan as outlined today during the consultation with me. All of her questions were addressed. Thank you for allowing us to participate in your patient's medical care. We'll keep you updated on these investigations. Pursuant to the emergency declaration under the [address] [place] [address], [facility] waiver authority and the [employer] Act, this Virtual  Visit was conducted, with patient's consent, to reduce the patient's risk of exposure to COVID-19 and provide continuity of care for an established patient. Services were provided through a video synchronous discussion virtually to substitute for in-person clinic visit.     Denver Matin, MD    Electronically signed by    Kong Pizarro MD  Diplomate in Sleep Medicine  [facility]

## 2021-11-08 NOTE — PATIENT INSTRUCTIONS
217 [facility] [place]. [place], [address]  Tel.  [phone]  Fax. 7510 [facility]  [place], [address] [facility] [employer]  Tel.  [phone]  Fax. [phone] [address]  Tel.  [phone]  Fax. [phone]     Sleep Apnea: After Your Visit  Your Care Instructions  Sleep apnea occurs when you frequently stop breathing for 10 seconds or longer during sleep. It can be mild to severe, based on the number of times per hour that you stop breathing or have slowed breathing. Blocked or narrowed airways in your nose, mouth, or throat can cause sleep apnea. Your airway can become blocked when your throat muscles and tongue relax during sleep. Sleep apnea is common, occurring in 1 out of 20 individuals. Individuals having any of the following characteristics should be evaluated and treated right away due to high risk and detrimental consequences from untreated sleep apnea:  1. Obesity  2. Congestive Heart failure  3. Atrial Fibrillation  4. Uncontrolled Hypertension  5. Type II Diabetes  6. Night-time Arrhythmias  7. Stroke  8. Pulmonary Hypertension  9. High-risk Driving Populations (pilots, truck drivers, etc.)  10. Patients Considering Weight-loss Surgery    How do you know you have sleep apnea? You probably have sleep apnea if you answer 'yes' to 3 or more of the following questions:  S - Have you been told that you Snore? T - Are you often Tired during the day? O - Has anyone Observed you stop breathing while sleeping? P- Do you have (or are being treated for) high blood Pressure? B - Are you obese (Body Mass Index > 35)? A - Is your Age 48years old or older? N - Is your Neck size greater than 16 inches? G - Are you male Gender? A sleep physician can prescribe a breathing device that prevents tissues in the throat from blocking your airway.  Or your doctor may recommend using a dental device (oral breathing device) to help keep your airway open. In some cases, surgery may be needed to remove enlarged tissues in the throat. Follow-up care is a key part of your treatment and safety. Be sure to make and go to all appointments, and call your doctor if you are having problems. It's also a good idea to know your test results and keep a list of the medicines you take. How can you care for yourself at home? · Lose weight, if needed. It may reduce the number of times you stop breathing or have slowed breathing. · Go to bed at the same time every night. · Sleep on your side. It may stop mild apnea. If you tend to roll onto your back, sew a pocket in the back of your pajama top. Put a tennis ball into the pocket, and stitch the pocket shut. This will help keep you from sleeping on your back. · Avoid alcohol and medicines such as sleeping pills and sedatives before bed. · Do not smoke. Smoking can make sleep apnea worse. If you need help quitting, talk to your doctor about stop-smoking programs and medicines. These can increase your chances of quitting for good. · Prop up the head of your bed 4 to 6 inches by putting bricks under the legs of the bed. · Treat breathing problems, such as a stuffy nose, caused by a cold or allergies. · Use a continuous positive airway pressure (CPAP) breathing machine if lifestyle changes do not help your apnea and your doctor recommends it. The machine keeps your airway from closing when you sleep. · If CPAP does not help you, ask your doctor whether you should try other breathing machines. A bilevel positive airway pressure machine has two types of air pressureâone for breathing in and one for breathing out. Another device raises or lowers air pressure as needed while you breathe. · If your nose feels dry or bleeds when using one of these machines, talk with your doctor about increasing moisture in the air. A humidifier may help.   · If your nose is runny or stuffy from using a breathing machine, talk with your doctor about using decongestants or a corticosteroid nasal spray. When should you call for help? Watch closely for changes in your health, and be sure to contact your doctor if:  · You still have sleep apnea even though you have made lifestyle changes. · You are thinking of trying a device such as CPAP. · You are having problems using a CPAP or similar machine. Where can you learn more? Go to [employer]. Enter L135 in the search box to learn more about \"Sleep Apnea: After Your Visit. \"   © 3363-3650 Healthwise, Incorporated. Care instructions adapted under license by [employer] (which disclaims liability or warranty for this information). This care instruction is for use with your licensed healthcare professional. If you have questions about a medical condition or this instruction, always ask your healthcare professional. Marley Pradhan any warranty or liability for your use of this information. PROPER SLEEP HYGIENE    What to avoid  · Do not have drinks with caffeine, such as coffee or black tea, for 8 hours before bed. · Do not smoke or use other types of tobacco near bedtime. Nicotine is a stimulant and can keep you awake. · Avoid drinking alcohol late in the evening, because it can cause you to wake in the middle of the night. · Do not eat a big meal close to bedtime. If you are hungry, eat a light snack. · Do not drink a lot of water close to bedtime, because the need to urinate may wake you up during the night. · Do not read or watch TV in bed. Use the bed only for sleeping and sexual activity. What to try  · Go to bed at the same time every night, and wake up at the same time every morning. Do not take naps during the day. · Keep your bedroom quiet, dark, and cool. · Get regular exercise, but not within 3 to 4 hours of your bedtime. .  · Sleep on a comfortable pillow and mattress.   · If watching the clock makes you anxious, turn it facing away from you so you cannot see the time. · If you worry when you lie down, start a worry book. Well before bedtime, write down your worries, and then set the book and your concerns aside. · Try meditation or other relaxation techniques before you go to bed. · If you cannot fall asleep, get up and go to another room until you feel sleepy. Do something relaxing. Repeat your bedtime routine before you go to bed again. · Make your house quiet and calm about an hour before bedtime. Turn down the lights, turn off the TV, log off the computer, and turn down the volume on music. This can help you relax after a busy day. Drowsy Driving  The [address] [place] [address] Road Traffic Safety Administration cites drowsiness as a causing factor in more than 777,710 police reported crashes annually, resulting in 76,000 injuries and 1,500 deaths. Other surveys suggest 55% of people polled have driven while drowsy in the past year, 23% had fallen asleep but not crashed, 3% crashed, and 2% had and accident due to drowsy driving. Who is at risk? Young Drivers: One study of drowsy driving accidents states that 55% of the drivers were under 25 years. Of those, 75% were male. Shift Workers and Travelers: People who work overnight or travel across time zones frequently are at higher risk of experiencing Circadian Rhythm Disorders. They are trying to work and function when their body is programed to sleep. Sleep Deprived: Lack of sleep has a serious impact on your ability to pay attention or focus on a task. Consistently getting less than the average of 8 hours your body needs creates partial or cumulative sleep deprivation. Untreated Sleep Disorders: Sleep Apnea, Narcolepsy, R.L.S., and other sleep disorders (untreated) prevent a person from getting enough restful sleep. This leads to excessive daytime sleepiness and increases the risk for drowsy driving accidents by up to 7 times.   Medications / Alcohol: Even over the counter medications can cause drowsiness. Medications that impair a drivers attention should have a warning label. Alcohol naturally makes you sleepy and on its own can cause accidents. Combined with excessive drowsiness its effects are amplified. Signs of Drowsy Driving:   * You don't remember driving the last few miles   * You may drift out of your olra   * You are unable to focus and your thoughts wander   * You may yawn more often than normal   * You have difficulty keeping your eyes open / nodding off   * Missing traffic signs, speeding, or tailgating  Prevention-   Good sleep hygiene, lifestyle and behavioral choices have the most impact on drowsy driving. There is no substitute for sleep and the average person requires 8 hours nightly. If you find yourself driving drowsy, stop and sleep. Consider the sleep hygiene tips provided during your visit as well. Medication Refill Policy: Refills for all medications require 1 week advance notice. Please have your pharmacy fax a refill request. We are unable to fax, or call in \"controled substance\" medications and you will need to pick these prescriptions up from our office. Sefaira Activation    Thank you for requesting access to Sefaira. Please follow the instructions below to securely access and download your online medical record. Sefaira allows you to send messages to your doctor, view your test results, renew your prescriptions, schedule appointments, and more. How Do I Sign Up? 1. In your internet browser, go to [email or web address]. 2. Click on the First Time User? Click Here link in the Sign In box. You will see the New Member Sign Up page. 3. Enter your Sefaira Access Code exactly as it appears below. You will not need to use this code after youve completed the sign-up process. If you do not sign up before the expiration date, you must request a new code. Sefaira Access Code:  Activation code not generated  Current Sefaira Status: Active (This is the date your Propeller access code will )    4. Enter the last four digits of your Social Security Number (xxxx) and Date of Birth (mm/dd/yyyy) as indicated and click Submit. You will be taken to the next sign-up page. 5. Create a Propeller ID. This will be your Propeller login ID and cannot be changed, so think of one that is secure and easy to remember. 6. Create a Propeller password. You can change your password at any time. 7. Enter your Password Reset Question and Answer. This can be used at a later time if you forget your password. 8. Enter your e-mail address. You will receive e-mail notification when new information is available in [address] 9. Click Sign Up. You can now view and download portions of your medical record. 10. Click the Download Summary menu link to download a portable copy of your medical information. Additional Information    If you have questions, please call [phone]. Remember, Propeller is NOT to be used for urgent needs. For medical emergencies, dial 911.

## 2021-11-08 NOTE — PROGRESS NOTES
Mayito East is a 46 y.o. female  Chief Complaint   Patient presents with    Follow-up     chronic Thrombocytosis      1. Have you been to the ER, urgent care clinic since your last visit? Hospitalized since your last visit? No.  2. Have you seen or consulted any other health care providers outside of the [address] [place] [address] since your last visit? Include any pap smears or colon screening. No.    Patient states she has been having pain under breast on ribcage.

## 2021-11-08 NOTE — PROGRESS NOTES
Assumed day shift care at start of shift   Patient a+o x 4 - Moroccan speaking only - ipad  utilized prn  Denies pain  Bm x 2 this shift - loose stools - 2ndary to meds on prior shift - refer to MAR  Patiromer and calcicum gluconate admin a/o - K 6.2 this am and decreased to 5.3 this afternoon  Denies chest pain/dizziness/lightheadedness/sob/numbness/tingling - monitored on telemetry - SR in the 60's-70's - vss  Bicarb gtt running a/o   No appetite - consuming only water at this time - declining all meals - declining supplements at this time  as well  Supervision oob - steady gait   No needs at this time, wctm    Fall precautions/hourly rounding maintained, call light within reach and functioning, all items within reach.  Patient encouraged to call for assistance, poc reviewed with patient, ?'s/concerns answered.         Problem: Safety  Goal: Will remain free from injury  Outcome: PROGRESSING AS EXPECTED     Problem: Bowel/Gastric:  Goal: Normal bowel function is maintained or improved  Outcome: PROGRESSING AS EXPECTED     Problem: Knowledge Deficit  Goal: Knowledge of disease process/condition, treatment plan, diagnostic tests, and medications will improve  Outcome: PROGRESSING AS EXPECTED      No prior authorization required for HSAT with [facility] on 11/08/2021

## 2021-11-08 NOTE — Clinical Note
Thank you for the referral.  I will keep you informed of her progress.   [address],  Omaria Nix

## 2021-11-08 NOTE — PROGRESS NOTES
Hematology/Oncology Progress Note    REASON FOR VISIT: chronic Thrombocytosis since     HISTORY OF PRESENT ILLNESS: Ms. Funmi Curiel is a 46 y.o. woman who presents for follow-up of chronic thrombocytosis. Last seen by us 2018. Here today due to concerns about labs and abdominal pain on RUQ and RLQ since . Pt's dad  recently and pt wants to do labs. Prior pt of Dr Morris Sanabria. Hx of BMB negative. Extensive heme w/u negative. Platelets are up and down/ no upward trend. Pt clinically is stable. To see PCP soon. Had sleep study appt this am.   No fevers/ chills/ chest pain/ SOB/ nausea/ vomiting/diarrhea/       Allergies   Allergen Reactions    Crestor [Rosuvastatin] Myalgia     Insomnia and muscle pain    Flonase [Fluticasone] Other (comments)     Cataracts enlarged    Hydrochlorothiazide Other (comments)     Insomnia, headache, ringing in ears, anxious    Nortriptyline Other (comments)     Increased depression    Other Medication Other (comments)     Pt is avoiding Nasal CS d/t cataracts    Sulfa (Sulfonamide Antibiotics) Hives       Current Outpatient Medications   Medication Sig Dispense Refill    Advair Diskus 100-50 mcg/dose diskus inhaler USE 1 INHALATION EVERY 12 HOURS 3 Inhaler 3    amLODIPine (NORVASC) 5 mg tablet TAKE 1 TABLET BY MOUTH EVERY DAY 90 Tablet 2    atorvastatin (LIPITOR) 20 mg tablet Take 1 Tablet by mouth every evening. 90 Tablet 3    ALPRAZolam (XANAX) 0.5 mg tablet Take 0.5 Tablets by mouth nightly as needed for Anxiety or Sleep. Max Daily Amount: 0.25 mg. 15 Tablet 5    spironolactone (ALDACTONE) 50 mg tablet Take 1 Tablet by mouth daily. 90 Tablet 1    MILK THISTLE PO Take  by mouth daily.  KRILL OIL PO Take  by mouth daily.  ubidecarenone (CO Q-10 PO) Take  by mouth daily.       levonorgestrel-ethinyl estradiol (SEASONALE) 0.15 mg-30 mcg (91) 3MPk TAKE 1 TABLET DAILY 3 Package 1    sertraline (ZOLOFT) 50 mg tablet TAKE ONE AND ONE-HALF TABLETS DAILY 135 Tab 3    cyclobenzaprine (FLEXERIL) 10 mg tablet Take 1 Tab by mouth every eight (8) hours as needed for Muscle Spasm(s). 30 Tab 1    fluticasone propionate (FLONASE NA) by Nasal route daily.  albuterol (VENTOLIN HFA) 90 mcg/actuation inhaler Take 2 Puffs by inhalation every six (6) hours as needed for Wheezing. 18 Inhaler 1    cholecalciferol, vitamin D3, (VITAMIN D3) 2,000 unit tab Take  by mouth daily.  ascorbic acid, vitamin C, (VITAMIN C) 1,000 mg tablet Take 1,000 mg by mouth daily.  turmeric (CURCUMIN) Take  by mouth daily.  cranberry extract 450 mg tab tablet Take 450 mg by mouth.  b complex vitamins tablet Take 1 Tab by mouth daily.  garlic cap Take  by mouth.  SLO-NIACIN PO Take 500 mg by mouth daily.  loratadine (CLARITIN) 10 mg tablet Take 10 mg by mouth daily.  aspirin delayed-release 81 mg tablet Take  by mouth daily. ROS  A complete review of systems was obtained, negative except as described above and as reported on ROS sheet scanned into system.        Physical Examination:   Visit Vitals  BP (!) 151/90 (BP 1 Location: Left upper arm, BP Patient Position: Sitting)   Pulse 94   Temp 97.2 °F (36.2 °C) (Temporal)   Ht 5' 6\" (1.676 m)   Wt 232 lb 6.4 oz (105.4 kg)   SpO2 96%   BMI 37.51 kg/m²     General appearance - alert, well appearing, and in no distress  Mental status - oriented to person, place, and time  Mouth - mucous membranes moist, wearing mask  Neck - supple  Chest - clear to auscultation  Heart - normal rate, regular rhythm  Abdomen - soft, nontender, nondistended  Neurological - normal speech, no focal findings   Musculoskeletal - no joint tenderness, deformity or swelling  Extremities -  no pedal edema, no clubbing or cyanosis  Skin - normal coloration and turgor, no rashes, no suspicious skin lesions noted    LABS  Lab Results   Component Value Date/Time    WBC 10.4 08/21/2020 11:26 AM    HGB 13.5 08/21/2020 11:26 AM    HCT 40.9 08/21/2020 11:26 AM    PLATELET 363 (H) 41/91/8242 11:26 AM    MCV 89 08/21/2020 11:26 AM    ABS. NEUTROPHILS 5.5 08/21/2020 11:26 AM     Lab Results   Component Value Date/Time    Sodium 134 (L) 06/07/2021 08:56 AM    Potassium 4.7 06/07/2021 08:56 AM    Chloride 105 06/07/2021 08:56 AM    CO2 23 06/07/2021 08:56 AM    Glucose 94 06/07/2021 08:56 AM    BUN 18 06/07/2021 08:56 AM    Creatinine 0.75 06/07/2021 08:56 AM    GFR est AA >60 06/07/2021 08:56 AM    GFR est non-AA >60 06/07/2021 08:56 AM    Calcium 9.3 06/07/2021 08:56 AM     Lab Results   Component Value Date/Time    Alk. phosphatase 98 06/07/2021 08:56 AM    Protein, total 7.1 06/07/2021 08:56 AM    Albumin 3.8 06/07/2021 08:56 AM    Globulin 3.3 06/07/2021 08:56 AM    A-G Ratio 1.2 06/07/2021 08:56 AM     Lab Results   Component Value Date/Time    Iron % saturation 35 07/03/2018 11:42 AM    TIBC 312 07/03/2018 11:42 AM    Ferritin 152 (H) 07/03/2018 11:42 AM    Vitamin B12 539 10/17/2017 09:12 AM     04/07/2015 09:25 AM    Sed rate (ESR) 5 04/07/2015 09:25 AM    C-Reactive Protein, Cardiac 5.61 (H) 03/13/2014 03:06 PM    TSH 2.58 06/07/2021 08:56 AM     3/13/14 - DIMPLE negative, JAK2 negative. PLATELET   Date Value   08/21/2020 464 x10E3/uL (H)   07/31/2018 486 K/uL (H)   07/03/2018 518 x10E3/uL (H)   05/24/2018 536 x10E3/uL (H)   04/10/2017 458 x10E3/uL (H)   09/19/2016 495 x10E3/uL (H)   03/22/2016 570 x10E3/uL (H)   11/05/2015 474 x10E3/uL (H)   09/30/2015 586 x10E3/uL (H)   04/07/2015 493 x10E3/uL (H)   03/30/2015 512 x10E3/uL (H)   09/24/2014 490 x10E3/uL (H)   03/13/2014 493 x10E3/uL (H)   02/17/2014 507 x10E3/uL (H)   11/23/2012 494 x10E3/uL (H)   10/25/2011 464 x10E3/uL (H)   04/28/2010 435 x10E3/uL (H)        PATHOLOGY  Bone marrow biopsy on 3/27/14 (B14-80) with active cellular marrow, no sign of hematologic disorder. ASSESSMENT/PLAN:     Ms. Lamar Joya is a 46 y.o. woman who presents for follow-up of thrombocytosis.     1. Thrombocytosis since 2010. Prior pt of Dr Morris Sanabria. Last seen 2018. Seen today due to concerns about no recent CBC and abdominal pain on RIGHT side. Has had extensive heme ibarra in past.    Her platelet count had been more or less stable since 2010. No upward trend. Will recheck CBC and comp met. Will order CT A/P and pt will fu with Liver team.   Based on the bone marrow biopsy 2014 and the lab results there is no clear reason for her thrombocytosis. There is no sign of a primary hematologic disorder. This is likely secondary thrombocytosis versus normal for her. Pt clinically stable today. Anxious about abdominal pain and labs so will do ibarra/   To see PCP soon also. 2. Lymphocytosis. BMB negative in past.      3. Hepatic steatosis and focal lesion. Now with abdominal pain RUQ and RLQ. Will do CT fu.  Pt will fu with Liver team.     Call if questions  I'll see her again in 12 months, sooner if needed.     Alla Palumbo, DO

## 2021-11-08 NOTE — PROGRESS NOTES
Call to patient, ID verified X 2. States that she has had a number of family and health changes recently. Has some non specific pains, has not had labs/scans recently, feels like needs follow up with Provider as has been a long time since last check in.

## 2021-11-09 LAB
ALBUMIN SERPL-MCNC: 4.8 G/DL (ref 3.8–4.9)
ALBUMIN/GLOB SERPL: 2 {RATIO} (ref 1.2–2.2)
ALP SERPL-CCNC: 103 IU/L (ref 44–121)
ALT SERPL-CCNC: 24 IU/L (ref 0–32)
AST SERPL-CCNC: 20 IU/L (ref 0–40)
BASOPHILS # BLD AUTO: 0.1 X10E3/UL (ref 0–0.2)
BASOPHILS NFR BLD AUTO: 1 %
BILIRUB SERPL-MCNC: 0.3 MG/DL (ref 0–1.2)
BUN SERPL-MCNC: 14 MG/DL (ref 6–24)
BUN/CREAT SERPL: 18 (ref 9–23)
CALCIUM SERPL-MCNC: 10.4 MG/DL (ref 8.7–10.2)
CHLORIDE SERPL-SCNC: 102 MMOL/L (ref 96–106)
CO2 SERPL-SCNC: 21 MMOL/L (ref 20–29)
CREAT SERPL-MCNC: 0.8 MG/DL (ref 0.57–1)
EOSINOPHIL # BLD AUTO: 0.2 X10E3/UL (ref 0–0.4)
EOSINOPHIL NFR BLD AUTO: 2 %
ERYTHROCYTE [DISTWIDTH] IN BLOOD BY AUTOMATED COUNT: 13.2 % (ref 11.7–15.4)
GLOBULIN SER CALC-MCNC: 2.4 G/DL (ref 1.5–4.5)
GLUCOSE SERPL-MCNC: 89 MG/DL (ref 65–99)
HCT VFR BLD AUTO: 42 % (ref 34–46.6)
HGB BLD-MCNC: 14 G/DL (ref 11.1–15.9)
IMM GRANULOCYTES # BLD AUTO: 0 X10E3/UL (ref 0–0.1)
IMM GRANULOCYTES NFR BLD AUTO: 0 %
LYMPHOCYTES # BLD AUTO: 5 X10E3/UL (ref 0.7–3.1)
LYMPHOCYTES NFR BLD AUTO: 39 %
MCH RBC QN AUTO: 28.6 PG (ref 26.6–33)
MCHC RBC AUTO-ENTMCNC: 33.3 G/DL (ref 31.5–35.7)
MCV RBC AUTO: 86 FL (ref 79–97)
MONOCYTES # BLD AUTO: 0.7 X10E3/UL (ref 0.1–0.9)
MONOCYTES NFR BLD AUTO: 5 %
NEUTROPHILS # BLD AUTO: 6.8 X10E3/UL (ref 1.4–7)
NEUTROPHILS NFR BLD AUTO: 53 %
PLATELET # BLD AUTO: 590 X10E3/UL (ref 150–450)
POTASSIUM SERPL-SCNC: 4.6 MMOL/L (ref 3.5–5.2)
PROT SERPL-MCNC: 7.2 G/DL (ref 6–8.5)
RBC # BLD AUTO: 4.9 X10E6/UL (ref 3.77–5.28)
SODIUM SERPL-SCNC: 137 MMOL/L (ref 134–144)
WBC # BLD AUTO: 12.8 X10E3/UL (ref 3.4–10.8)

## 2021-11-15 ENCOUNTER — TELEPHONE (OUTPATIENT)
Dept: ONCOLOGY | Age: 51
End: 2021-11-15

## 2021-11-15 ENCOUNTER — OFFICE VISIT (OUTPATIENT)
Dept: FAMILY MEDICINE CLINIC | Age: 51
End: 2021-11-15
Payer: COMMERCIAL

## 2021-11-15 VITALS
OXYGEN SATURATION: 98 % | DIASTOLIC BLOOD PRESSURE: 88 MMHG | HEART RATE: 92 BPM | TEMPERATURE: 98.2 F | WEIGHT: 233.2 LBS | HEIGHT: 66 IN | RESPIRATION RATE: 16 BRPM | BODY MASS INDEX: 37.48 KG/M2 | SYSTOLIC BLOOD PRESSURE: 132 MMHG

## 2021-11-15 DIAGNOSIS — R10.84 GENERALIZED ABDOMINAL PAIN: Primary | ICD-10-CM

## 2021-11-15 LAB
BILIRUB UR QL STRIP: NEGATIVE
GLUCOSE UR-MCNC: NEGATIVE MG/DL
KETONES P FAST UR STRIP-MCNC: NEGATIVE MG/DL
PH UR STRIP: 6 [PH] (ref 4.6–8)
PROT UR QL STRIP: NEGATIVE
SP GR UR STRIP: 1.01 (ref 1–1.03)
UA UROBILINOGEN AMB POC: NORMAL (ref 0.2–1)
URINALYSIS CLARITY POC: CLEAR
URINALYSIS COLOR POC: COLORLESS
URINE BLOOD POC: NORMAL
URINE LEUKOCYTES POC: NORMAL
URINE NITRITES POC: NEGATIVE

## 2021-11-15 PROCEDURE — 81003 URINALYSIS AUTO W/O SCOPE: CPT | Performed by: FAMILY MEDICINE

## 2021-11-15 PROCEDURE — 99213 OFFICE O/P EST LOW 20 MIN: CPT | Performed by: FAMILY MEDICINE

## 2021-11-15 NOTE — PROGRESS NOTES
Chief Complaint   Patient presents with    Abdominal Pain     BUQ and under ribs x 1 month, RLQ intermittent since 8-2021       HISTORY OF PRESENT ILLNESS   HPI  Patient presents today to discuss various different abdominal complaints. 1 month h/o pains going across upper abdomen on both sides going under rib cage, R>L. Described as a constant aching. Worse w/ stress. Not related to meals or position change. Also since 8-2021 has been experiencing intermittent RLQ pains. Described as sharp. No pattern. Also not related to meals. Sometimes crunching over and holding her right lower abdomen makes it feel better. None of her GI symptoms seem to radiate anywhere else and she denies back or flank pain. Denies N/V/D/C  No change in bowel/bladder habits   No change in caliber or color of stools  No blood in stools or melena  No urinary symptoms  No fevers, chills, sweats or weight loss  She does not have periods because she takes BCP continuous for h/o dysmenorrhea. Using a heating pad to any of the areas helps. Not taking anything OTC for symptom relief because her symptoms are not too severe and she doesn't want to mask anything. She has never had a colonoscopy but was planning on scheduling that w/ Dr. Mccormack Palermo end of the year or early next year. When she saw her Hematologist Dr. Kimi Cyr last week, she mentioned her abdominal complaints to her and was scheduled for a CT of Abd/Pelvis. But patient cancelled it once she found out the out of pocket cost was going to be so expensive. REVIEW OF SYMPTOMS   Review of Systems   Constitutional: Negative. Negative for chills, fever and weight loss. Respiratory: Negative. Cardiovascular: Negative. Gastrointestinal: Negative for blood in stool, constipation, diarrhea, melena, nausea and vomiting. Genitourinary: Negative for dysuria, flank pain, frequency, hematuria and urgency. Musculoskeletal: Negative for back pain.            PROBLEM LIST/MEDICAL HISTORY     Problem List  Date Reviewed: 11/15/2021          Codes Class Noted    Benign essential hypertension ICD-10-CM: I10  ICD-9-CM: 401.1  2/24/2015    Overview Signed 2/24/2015 10:21 AM by Phong Mohamud MD     [id number]             Hyposomnia, insomnia or sleeplessness associated with anxiety ICD-10-CM: F51.05, F41.9  ICD-9-CM: 293.84, 327.02  12/26/2018        Controlled substance agreement signed ICD-10-CM: Z79.899  ICD-9-CM: V58.69  5/24/2018    Overview Addendum 12/31/2020 12:07 PM by Phong Mohamud MD     2017 and annually              Prediabetes ICD-10-CM: R73.03  ICD-9-CM: 790.29  4/23/2017    Overview Signed 4/23/2017  9:16 PM by Phong Mohamud MD     Mild 4-2017             Muscle contraction headaches & Neck Muscle Spasms ICD-10-CM: K36.691  ICD-9-CM: 307.81  4/10/2017        Thrombocytosis ICD-10-CM: V42.240  ICD-9-CM: 238.71  3/22/2016    Overview Addendum 5/24/2018 10:44 AM by Phong Mohamud MD     Evaluation Hem-Onc Dr Jose Maria Valiente 2015: Based on the bone marrow biopsy and the lab results there is no clear reason for her thrombocytosis. There is no sign of a primary hematologic disorder. This is likely secondary thrombocytosis versus normal for her. All of the results that we have for her have been high-normal.  This makes malignancy less likely. She has seen rheumatology and there is no sign of autoimmune disorder. Imaging negative for malignancy. Lymphocytosis ICD-10-CM: O92.680  ICD-9-CM: 288.61  3/22/2016    Overview Signed 3/22/2016 10:08 AM by MD Dr Jose Maria Briceno, 2015             Microhematuria ICD-10-CM: R31.29  ICD-9-CM: 599.72  12/15/2015    Overview Signed 12/15/2015  3:47 PM by Phong Mohamud MD     [address] [facility] Dr Dannielle Rivero.  KUB neg excpet small stone vs phlebolith             NAFL (nonalcoholic fatty liver) TVN-99-CQ: K76.0  ICD-9-CM: 571.8  8/27/2015    Overview Signed 8/27/2015  8:45 AM by MD Dr Jose Maria Galdamez             Focal nodular hyperplasia of liver ICD-10-CM: K76.89  ICD-9-CM: 573.8  5/10/2015    Overview Signed 8/27/2015  8:45 AM by Clarice Reyes MD     GI Dr Jose Maria Zaldivar and Onc Dr Christina Hooks             Vitamin D deficiency ICD-10-CM: E55.9  ICD-9-CM: 268.9  12/13/2011    Overview Signed 12/13/2011  1:16 PM by Clarice Reyes MD     10/2011             Hypercholesterolemia with high triglycerides ICD-10-CM: E78.2  ICD-9-CM: 272.2  12/13/2011        Chronic Mechanical back pain ICD-10-CM: M54.9  ICD-9-CM: 724.5  10/25/2011        History of pyelonephritis, 1992 ICD-10-CM: Z87.448  ICD-9-CM: V13.02  10/25/2011        Allergic rhinitis ICD-10-CM: J30.9  ICD-9-CM: 477.9  10/25/2011    Overview Signed 10/25/2011  9:45 AM by Clarice Reyes MD     Worse in Spring             Cataract of right eye ICD-10-CM: H26.9  ICD-9-CM: 366.9  10/25/2011    Overview Signed 10/25/2011  9:46 AM by Clarice Reyes MD     Opth VEI, Dr Urbina Boards             S/P Dog bite, 2003 ICD-10-CM: Luke Alexis. 0XXA  ICD-9-CM: E906.0  10/25/2011    Overview Signed 10/25/2011  9:46 AM by Clarice Reyes MD     Td and Rabies [facility]             Rosacea ICD-10-CM: L71.9  ICD-9-CM: 695.3  10/25/2011        Fibrocystic breast changes ICD-10-CM: N60.19  ICD-9-CM: 610.1  10/25/2011        Raynaud's syndrome ICD-10-CM: I73.00  ICD-9-CM: 443.0  Unknown    Overview Signed 8/27/2015  8:48 AM by Clarice Reyes MD     Autoimmune workup Dr Jalil Velazquez Spring 2015             PMS (premenstrual syndrome) ICD-10-CM: N94.3  ICD-9-CM: 625.4  6/21/2010        Depression ICD-10-CM: J99. A  ICD-9-CM: 855  6/21/2010        Asthma, mild intermittent ICD-10-CM: J45.909  ICD-9-CM: 493.90  6/21/2010    Overview Signed 10/25/2011  9:45 AM by Clarice Reyes MD     Since ~ 20 yo             Primary dysmenorrhea ICD-10-CM: N94.4  ICD-9-CM: 625.3  6/21/2010        Anxiety attacks ICD-10-CM: F41.0  ICD-9-CM: 300.01  6/21/2010    Overview Signed 6/21/2010  8:28 PM by Mushtaq Lowry MD     March 2010                       PAST SURGICAL HISTORY     Past Surgical History:   Procedure Laterality Date    EKG TREADMILL STRESS TEST  May 2010    Dr Carlton Barfield and Dr Kennedy hodge for atypical CP; dx Anxiety    EMG TWO EXTREMITIES UPPER  1999    normal    HX OTHER SURGICAL  3/2014 ordered by Dr De La Vega [place], benign    HX SHOULDER ARTHROSCOPY  1996    left shoulder    US PELV NON OBS  2004    normal    XR SPINE LUMB 2 OR 3 V  1993    normal    XR SPINE THORAC 3 V  1993    normal    XR UPPER GI SERIES W KUB  2000    normal         MEDICATIONS     Current Outpatient Medications   Medication Sig    Advair Diskus 100-50 mcg/dose diskus inhaler USE 1 INHALATION EVERY 12 HOURS    amLODIPine (NORVASC) 5 mg tablet TAKE 1 TABLET BY MOUTH EVERY DAY    atorvastatin (LIPITOR) 20 mg tablet Take 1 Tablet by mouth every evening.  ALPRAZolam (XANAX) 0.5 mg tablet Take 0.5 Tablets by mouth nightly as needed for Anxiety or Sleep. Max Daily Amount: 0.25 mg.    spironolactone (ALDACTONE) 50 mg tablet Take 1 Tablet by mouth daily.  MILK THISTLE PO Take  by mouth daily.  KRILL OIL PO Take  by mouth daily.  ubidecarenone (CO Q-10 PO) Take  by mouth daily.  levonorgestrel-ethinyl estradiol (SEASONALE) 0.15 mg-30 mcg (91) 3MPk TAKE 1 TABLET DAILY    sertraline (ZOLOFT) 50 mg tablet TAKE ONE AND ONE-HALF TABLETS DAILY    cyclobenzaprine (FLEXERIL) 10 mg tablet Take 1 Tab by mouth every eight (8) hours as needed for Muscle Spasm(s).  fluticasone propionate (FLONASE NA) by Nasal route daily.  albuterol (VENTOLIN HFA) 90 mcg/actuation inhaler Take 2 Puffs by inhalation every six (6) hours as needed for Wheezing.  ascorbic acid, vitamin C, (VITAMIN C) 1,000 mg tablet Take 1,000 mg by mouth daily.  turmeric (CURCUMIN) Take  by mouth daily.     cranberry extract 450 mg tab tablet Take 450 mg by mouth.  garlic cap Take  by mouth.  SLO-NIACIN PO Take 500 mg by mouth daily.  loratadine (CLARITIN) 10 mg tablet Take 10 mg by mouth daily.  aspirin delayed-release 81 mg tablet Take  by mouth daily. No current facility-administered medications for this visit. ALLERGIES     Allergies   Allergen Reactions    Crestor [Rosuvastatin] Myalgia     Insomnia and muscle pain    Flonase [Fluticasone] Other (comments)     Cataracts enlarged    Hydrochlorothiazide Other (comments)     Insomnia, headache, ringing in ears, anxious    Nortriptyline Other (comments)     Increased depression    Other Medication Other (comments)     Pt is avoiding Nasal CS d/t cataracts    Sulfa (Sulfonamide Antibiotics) Hives          SOCIAL HISTORY     Social History     Tobacco Use    Smoking status: Former Smoker     Types: Cigarettes     Quit date: 10/25/1991     Years since quittin.0    Smokeless tobacco: Never Used    Tobacco comment: smoked lightly for about 5 months   Substance Use Topics    Alcohol use:  Yes     Alcohol/week: 0.0 standard drinks     Comment: very seldom     Social History     Social History Narrative    Life Partner     No children           Former Principal at [employer]    Since 18: New job at [place] [id number] as [employer] &     Diet: has been trying to cut back on carbs lately    Exercise: none for a long time, stays active; they have full exercise equipment at home but she admits she doesn't use it     Caffeine: 36 oz sodas a day (Liskunal)    Weight: has been going up over time since not eating healthy or exercising regularly                 Social History     Substance and Sexual Activity   Sexual Activity Never    Partners: Female    Comment: Same sex partner       IMMUNIZATIONS     Immunization History   Administered Date(s) Administered    COVID-19, PFIZER, MRNA, LNP-S, PF, 30MCG/0.3ML DOSE 2021, 2021, 10/02/2021    Influenza Vaccine 2014, 10/29/2017, 10/29/2017, 10/18/2018, 10/21/2021    Influenza Vaccine (Quad) PF (>6 Mo Flulaval, Fluarix, and >3 Yrs Afluria, Fluzone 86257) 10/13/2019, 2020    Meningococcal Vaccine 2002    PPD 1998, 1999    Pneumococcal Polysaccharide (PPSV-23) 2019    Rabies Vaccine 2003    TD Vaccine 1998, 2003    Tdap 2014         FAMILY HISTORY     Family History   Problem Relation Age of Onset    High Cholesterol Mother         high TG's in the 80's    Hypertension Mother     Thyroid Disease Mother     Other Mother         benign ovarian cysts    Anxiety Mother     Depression Mother     Diabetes Father         type 2    Cancer Father         Hodgkins    Arrhythmia Father 72        Ablation; chemo/radiation induced    Heart Surgery Father 72        stents placed    Lung Cancer Father          age 71 yo in     Stroke Father 70    Tremors Father         essential tremor    Anxiety Maternal Grandmother     Heart Disease Maternal Grandmother          80; h/o A. Fib    Stroke Maternal Grandmother     Heart Attack Maternal Grandfather          age 74    Other Paternal Grandmother          GI bleed in her early 63's    Cancer Paternal Grandfather 61        pancreatic and liver cancer,  age 61         VITALS     Visit Vitals  /88 (BP 1 Location: Left upper arm, BP Patient Position: Sitting, BP Cuff Size: Large adult)   Pulse 92   Temp 98.2 °F (36.8 °C) (Oral)   Resp 16   Ht 5' 6\" (1.676 m)   Wt 233 lb 3.2 oz (105.8 kg)   SpO2 98%   BMI 37.64 kg/m²          PHYSICAL EXAMINATION   Physical Exam  Vitals reviewed. Constitutional:       General: She is not in acute distress. Appearance: Normal appearance. She is not ill-appearing. Cardiovascular:      Rate and Rhythm: Regular rhythm. Heart sounds: Normal heart sounds.    Pulmonary:      Effort: Pulmonary effort is normal.      Breath sounds: Normal breath sounds. Abdominal:      General: Bowel sounds are normal. There is no distension. Palpations: Abdomen is soft. Tenderness: There is no guarding or rebound. Negative signs include Zaidi's sign and McBurney's sign. Comments: Abdominal exam is non focal. She has mild generalized tenderness is various areas but feels that it is more pronounced in the right upper quadrant and right lower quadrant. Neurological:      Mental Status: She is alert. LABORATORY DATA/ANCILLARY/IMAGING     Results for orders placed or performed in visit on 11/15/21   AMB POC URINALYSIS DIP STICK AUTO W/O MICRO   Result Value Ref Range    Color (UA POC) Colorless     Clarity (UA POC) Clear     Glucose (UA POC) Negative Negative    Bilirubin (UA POC) Negative Negative    Ketones (UA POC) Negative Negative    Specific gravity (UA POC) 1.010 1.001 - 1.035    Blood (UA POC) Trace Negative    pH (UA POC) 6.0 4.6 - 8.0    Protein (UA POC) Negative Negative    Urobilinogen (UA POC) 0.2 mg/dL 0.2 - 1    Nitrites (UA POC) Negative Negative    Leukocyte esterase (UA POC) Trace Negative       Lab Results   Component Value Date/Time    ALT (SGPT) 24 11/08/2021 04:30 PM    Alk.  phosphatase 103 11/08/2021 04:30 PM    Bilirubin, direct 0.10 11/21/2020 08:00 AM    Bilirubin, total 0.3 11/08/2021 04:30 PM    Albumin 4.8 11/08/2021 04:30 PM    Protein, total 7.2 11/08/2021 04:30 PM    PLATELET 148 (H) 33/71/6425 04:30 PM     Lab Results   Component Value Date/Time    Sodium 137 11/08/2021 04:30 PM    Potassium 4.6 11/08/2021 04:30 PM    Chloride 102 11/08/2021 04:30 PM    CO2 21 11/08/2021 04:30 PM    Anion gap 6 06/07/2021 08:56 AM    Glucose 89 11/08/2021 04:30 PM    BUN 14 11/08/2021 04:30 PM    Creatinine 0.80 11/08/2021 04:30 PM    BUN/Creatinine ratio 18 11/08/2021 04:30 PM    GFR est AA 99 11/08/2021 04:30 PM    GFR est non-AA 86 11/08/2021 04:30 PM    Calcium 10.4 (H) 11/08/2021 04:30 PM    Bilirubin, total 0.3 11/08/2021 04:30 PM    ALT (SGPT) 24 11/08/2021 04:30 PM    Alk. phosphatase 103 11/08/2021 04:30 PM    Protein, total 7.2 11/08/2021 04:30 PM    Albumin 4.8 11/08/2021 04:30 PM    Globulin 3.3 06/07/2021 08:56 AM    A-G Ratio 2.0 11/08/2021 04:30 PM          ASSESSMENT & PLAN       ICD-10-CM ICD-9-CM    1. Generalized abdominal pain  R10.84 789.07      Patient presents today to discuss various different abdominal complaints as detailed in HPI above. Her urine was negative in office today. She was scheduled for CT ABD/PELV W/ CON study but cancelled it due to cost.  Differential diagnoses including gallbladder colic, IBS, ovarian cysts, diverticular disease, peptic disease, endometriosis to name a few. Discussed w/ her that given her symptom complex the best study of choice would be the CT study as well as evaluation by GI. She will call the scheduling department back today to get that rescheduled and see if any payment plan can be arranged. Also I had referred her to Dr. Gomez Sánchez 1 yr ago for baseline colonoscopy screening and she was going to put this off again until after the end of the year. However I have encouraged her to go ahead and arrange that consultation now. If her CT is unrevealing, I feel she would need both an upper and lower endoscopy. If complete GI workup is unrevealing I would then refer to Gyn to evaluate for Endometriosis. Further recommendations pending review of CT for now. ER if anything worsens in the interim or for any other acute or significant changes.

## 2021-11-15 NOTE — TELEPHONE ENCOUNTER
Spoke with patient about scheduling 1 year follow up. Patient declined. She is waiting on other MD appointments and is still concerned with her high numbers.

## 2021-11-15 NOTE — PROGRESS NOTES
Chief Complaint   Patient presents with    Abdominal Pain     BUQ and under ribs x 1 month, RLQ intermittent since 8-2021     1. \"Have you been to the ER, urgent care clinic since your last visit? Hospitalized since your last visit? \" Yes Where: ortho on call in July for left     2. \"Have you seen or consulted any other health care providers outside of the [address] [place] [address] since your last visit? \" Yes Where: Dr Cayla Huang  ;last week  And sleep study visit with Dr Migdalia Gonsales  Will do sleep study on 12/29    3. For patients over 39: Has the patient had a colonoscopy? no    If the patient is female:    4. For patients over 40: Has the patient had a mammogram? In system    5.  For patients over 21: Has the patient had a pap smear?in system

## 2021-11-19 DIAGNOSIS — I10 BENIGN ESSENTIAL HYPERTENSION: ICD-10-CM

## 2021-11-19 RX ORDER — SPIRONOLACTONE 50 MG/1
TABLET, FILM COATED ORAL
Qty: 90 TABLET | Refills: 1 | Status: SHIPPED | OUTPATIENT
Start: 2021-11-19 | End: 2022-05-12

## 2021-11-29 ENCOUNTER — OFFICE VISIT (OUTPATIENT)
Dept: SLEEP MEDICINE | Age: 51
End: 2021-11-29

## 2021-11-29 ENCOUNTER — HOSPITAL ENCOUNTER (OUTPATIENT)
Dept: SLEEP MEDICINE | Age: 51
Discharge: HOME | End: 2021-11-29
Payer: COMMERCIAL

## 2021-11-29 DIAGNOSIS — G47.33 OSA (OBSTRUCTIVE SLEEP APNEA): Primary | ICD-10-CM

## 2021-11-29 PROCEDURE — 95806 SLEEP STUDY UNATT&RESP EFFT: CPT | Performed by: INTERNAL MEDICINE

## 2021-11-29 NOTE — PROGRESS NOTES
S>Mayela Pete is a 46 y.o. female seen today to receive a home sleep testing unit [id number] (HST). · Patient was educated on proper hookup and operation of the HST via detailed instruction sheet (per COVID-19 precautions)  · Instruction forms with after hours contact and documentation were signed. O>    There were no vitals taken for this visit. A>  No diagnosis found. P>  · General information regarding operations and maintenance of the device was provided. · Follow-up appointment was made to return the [facility] 11/30/21. She will be contacted once the results have been reviewed. · She was asked to contact our office for any problems regarding her home sleep test study.

## 2021-12-06 ENCOUNTER — HOSPITAL ENCOUNTER (OUTPATIENT)
Dept: CT IMAGING | Age: 51
Discharge: HOME | End: 2021-12-06
Payer: COMMERCIAL

## 2021-12-06 DIAGNOSIS — K76.0 HEPATIC STEATOSIS: ICD-10-CM

## 2021-12-06 DIAGNOSIS — R10.11 RIGHT UPPER QUADRANT ABDOMINAL PAIN: ICD-10-CM

## 2021-12-06 DIAGNOSIS — D75.839 THROMBOCYTOSIS: ICD-10-CM

## 2021-12-06 PROCEDURE — 74177 CT ABD & PELVIS W/CONTRAST: CPT | Performed by: INTERNAL MEDICINE

## 2021-12-06 NOTE — PROGRESS NOTES
HIPPA Verified. Called pt on mobile phone number listed. Patient was made aware that it is fine to keep March 2022 appointment per Provider. Patient verbalized understanding and expressed no question!   Dayan Sims

## 2021-12-06 NOTE — PROGRESS NOTES
TJ Verified. Called pt on mobile phone number listed. Patient was made aware of most recent CT scan being stable but per Provider she recommends fu with Liver Team for Hepatic Steatosis. Patient verbalized understanding and stated she had attempted to get an appointment with  but took their first available which she is now scheduled to see Mino Simpson with [facility] on March 7th 2022! Patient states she will keep that appointment unless needs to be seen sooner she will need a referral to another doctor.   Marquez Hdz

## 2021-12-13 ENCOUNTER — DOCUMENTATION ONLY (OUTPATIENT)
Dept: SLEEP MEDICINE | Age: 51
End: 2021-12-13

## 2021-12-20 ENCOUNTER — HOSPITAL ENCOUNTER (OUTPATIENT)
Dept: MAMMOGRAPHY | Age: 51
Discharge: HOME | End: 2021-12-20
Attending: FAMILY MEDICINE
Payer: COMMERCIAL

## 2021-12-20 DIAGNOSIS — Z12.31 SCREENING MAMMOGRAM FOR HIGH-RISK PATIENT: ICD-10-CM

## 2021-12-20 PROCEDURE — 77063 BREAST TOMOSYNTHESIS BI: CPT

## 2021-12-31 DIAGNOSIS — F51.05 HYPOSOMNIA, INSOMNIA OR SLEEPLESSNESS ASSOCIATED WITH ANXIETY: ICD-10-CM

## 2021-12-31 DIAGNOSIS — F41.1 GENERALIZED ANXIETY DISORDER WITH PANIC ATTACKS: ICD-10-CM

## 2021-12-31 DIAGNOSIS — F41.9 HYPOSOMNIA, INSOMNIA OR SLEEPLESSNESS ASSOCIATED WITH ANXIETY: ICD-10-CM

## 2021-12-31 DIAGNOSIS — F41.0 GENERALIZED ANXIETY DISORDER WITH PANIC ATTACKS: ICD-10-CM

## 2021-12-31 RX ORDER — ALPRAZOLAM 0.5 MG/1
0.25 TABLET ORAL
Qty: 15 TABLET | Refills: 5 | Status: CANCELLED | OUTPATIENT
Start: 2021-12-31

## 2022-01-01 RX ORDER — ALPRAZOLAM 0.5 MG/1
TABLET ORAL
Qty: 15 TABLET | Refills: 5 | Status: SHIPPED | OUTPATIENT
Start: 2022-01-01 | End: 2022-07-01

## 2022-01-03 NOTE — TELEPHONE ENCOUNTER
Chief Complaint   Patient presents with    Medication Refill     Xanax          Duplicate encounter, medication sent by Dr. Debra Majano on 1/1/2022.   Dragan Suárez, SHAYYN

## 2022-02-09 DIAGNOSIS — F41.8 DEPRESSION WITH ANXIETY: ICD-10-CM

## 2022-02-09 DIAGNOSIS — F41.0 PANIC ATTACKS: ICD-10-CM

## 2022-02-09 RX ORDER — SERTRALINE HYDROCHLORIDE 50 MG/1
TABLET, FILM COATED ORAL
Qty: 135 TABLET | Refills: 0 | Status: SHIPPED | OUTPATIENT
Start: 2022-02-09 | End: 2022-05-10

## 2022-03-07 ENCOUNTER — OFFICE VISIT (OUTPATIENT)
Dept: HEMATOLOGY | Age: 52
End: 2022-03-07
Payer: COMMERCIAL

## 2022-03-07 VITALS
HEART RATE: 81 BPM | TEMPERATURE: 97.3 F | BODY MASS INDEX: 37.32 KG/M2 | SYSTOLIC BLOOD PRESSURE: 148 MMHG | DIASTOLIC BLOOD PRESSURE: 94 MMHG | HEIGHT: 66 IN | WEIGHT: 232.2 LBS

## 2022-03-07 DIAGNOSIS — K76.0 NAFL (NONALCOHOLIC FATTY LIVER): Primary | ICD-10-CM

## 2022-03-07 DIAGNOSIS — E66.01 SEVERE OBESITY (BMI 35.0-39.9) WITH COMORBIDITY (HCC): ICD-10-CM

## 2022-03-07 PROCEDURE — 91200 LIVER ELASTOGRAPHY: CPT | Performed by: PHYSICIAN ASSISTANT

## 2022-03-07 PROCEDURE — 99204 OFFICE O/P NEW MOD 45 MIN: CPT | Performed by: PHYSICIAN ASSISTANT

## 2022-03-07 NOTE — PROGRESS NOTES
Identified pt with two pt identifiers(name and ). Reviewed record in preparation for visit and have obtained necessary documentation. Chief Complaint   Patient presents with    Fatty Liver     Establish care      Vitals:    22 0831   BP: (!) 148/94   Pulse: 81   Temp: 97.3 °F (36.3 °C)   TempSrc: Temporal   Weight: 232 lb 3.2 oz (105.3 kg)   Height: 5' 6\" (1.676 m)   PainSc:   0 - No pain       Health Maintenance Review: Patient reminded of \"due or due soon\" health maintenance. I have asked the patient to contact his/her primary care provider (PCP) for follow-up on his/her health maintenance. Coordination of Care Questionnaire:  :   1) Have you been to an emergency room, urgent care, or hospitalized since your last visit? If yes, where when, and reason for visit? no       2. Have seen or consulted any other health care provider since your last visit? If yes, where when, and reason for visit? NO      Patient is accompanied by self I have received verbal consent from Ame Ocampo to discuss any/all medical information while they are present in the room.

## 2022-03-07 NOTE — PROGRESS NOTES
[address] [facility], [place], [place], [facility] [place]      Roselle Sandhoff, PA-C Marney Bucks, [facility]     Joyce DELGADO Raymon, [facility]   Shauna Olszewski, FNP-GLO Mercado, [facility]       [place] [address]    at [place] [address] [address] [facility], [address] Marissa Hutson  22.    [phone]    FAX: [address] [place] [address] Drive, [address], [address]    [phone]    FAX: [phone]       Patient Care Team:  Vera Ruffin MD as PCP - General  Vera Ruffin MD as PCP - [facility]  Miguel Nieto DO (Hematology and Oncology)      Problem List  Date Reviewed: 11/15/2021          Codes Class Noted    Hyposomnia, insomnia or sleeplessness associated with anxiety ICD-10-CM: F51.05, F41.9  ICD-9-CM: 293.84, 327.02  12/26/2018        Controlled substance agreement signed ICD-10-CM: Z79.899  ICD-9-CM: V58.69  5/24/2018    Overview Addendum 12/31/2020 12:07 PM by Vera Ruffin MD     2017 and annually              Prediabetes ICD-10-CM: R73.03  ICD-9-CM: 790.29  4/23/2017    Overview Signed 4/23/2017  9:16 PM by Vera Ruffin MD     Mild [phone]             Muscle contraction headaches & Neck Muscle Spasms ICD-10-CM: G44.209  ICD-9-CM: 307.81  4/10/2017        Thrombocytosis ICD-10-CM: U91.398  ICD-9-CM: 238.71  3/22/2016    Overview Addendum 5/24/2018 10:44 AM by Vera Ruffin MD     Evaluation Hem-Onc Dr Armani Beebe 2015: Based on the bone marrow biopsy and the lab results there is no clear reason for her thrombocytosis. There is no sign of a primary hematologic disorder. This is likely secondary thrombocytosis versus normal for her.   All of the results that we have for her have been high-normal.  This makes malignancy less likely. She has seen rheumatology and there is no sign of autoimmune disorder. Imaging negative for malignancy. Lymphocytosis ICD-10-CM: X93.663  ICD-9-CM: 288.61  3/22/2016    Overview Signed 3/22/2016 10:08 AM by MD Dr Ansley Uriarte, 2015             Microhematuria ICD-10-CM: R31.29  ICD-9-CM: 599.72  12/15/2015    Overview Signed 12/15/2015  3:47 PM by Gudelia London MD     [id number] [facility] [address] Elgin Aguilar. KUB neg excpet small stone vs phlebolith             NAFL (nonalcoholic fatty liver) YKN-84-SL: K76.0  ICD-9-CM: 571.8  8/27/2015    Overview Signed 8/27/2015  8:45 AM by MD Dr Sweta Uriarte             Focal nodular hyperplasia of liver ICD-10-CM: K76.89  ICD-9-CM: 573.8  5/10/2015    Overview Signed 8/27/2015  8:45 AM by Gudelia London MD     GI Dr Sweta Hope and Onc Dr Ansley Collado             Benign essential hypertension ICD-10-CM: I10  ICD-9-CM: 401.1  2/24/2015    Overview Signed 2/24/2015 10:21 AM by Gudelia London MD     [id number]             Vitamin D deficiency ICD-10-CM: E55.9  ICD-9-CM: 268.9  12/13/2011    Overview Signed 12/13/2011  1:16 PM by Gudelia London MD     10/2011             Hypercholesterolemia with high triglycerides ICD-10-CM: E78.2  ICD-9-CM: 272.2  12/13/2011        Chronic Mechanical back pain ICD-10-CM: M54.9  ICD-9-CM: 724.5  10/25/2011        History of pyelonephritis, 1992 ICD-10-CM: Z87.448  ICD-9-CM: V13.02  10/25/2011        Allergic rhinitis ICD-10-CM: J30.9  ICD-9-CM: 477.9  10/25/2011    Overview Signed 10/25/2011  9:45 AM by Gudelia London MD     Worse in Spring             Cataract of right eye ICD-10-CM: H26.9  ICD-9-CM: 366.9  10/25/2011    Overview Signed 10/25/2011  9:46 AM by Gudelia London MD     Opth VEI, Dr Kimberly Rodriguez             S/P Dog bite, 2003 ICD-10-CM: W54. 0XXA  ICD-9-CM: E906.0  10/25/2011    Overview Signed 10/25/2011  9:46 AM by Sita Lr MD     Td and Rabies Series             Rosacea ICD-10-CM: L71.9  ICD-9-CM: 695.3  10/25/2011        Fibrocystic breast changes ICD-10-CM: N60.19  ICD-9-CM: 610.1  10/25/2011        Raynaud's syndrome ICD-10-CM: I73.00  ICD-9-CM: 443.0  Unknown    Overview Signed 8/27/2015  8:48 AM by Sita Lr MD     Autoimmune workup Dr Taylor Screws Spring 2015             PMS (premenstrual syndrome) ICD-10-CM: N94.3  ICD-9-CM: 625.4  6/21/2010        Depression ICD-10-CM: G32. A  ICD-9-CM: 074  6/21/2010        Asthma, mild intermittent ICD-10-CM: J45.909  ICD-9-CM: 493.90  6/21/2010    Overview Signed 10/25/2011  9:45 AM by Sita Lr MD     Since ~ 20 yo             Primary dysmenorrhea ICD-10-CM: N94.4  ICD-9-CM: 625.3  6/21/2010        Anxiety attacks ICD-10-CM: F41.0  ICD-9-CM: 300.01  6/21/2010    Overview Signed 6/21/2010  8:28 PM by Sita Lr MD     March 2010                 The physicians listed above have asked me to see Daria Luque in consultation regarding suspected fatty liver disease  All medical records sent by the referring physicians were reviewed including imaging studies. She was originally evaluated in this clinic in 2015 for steatosis and ?fatty lesion on imaging. She has been sent back for additional assessment as she has had some nagging RUQ pain and ongoing imaging changes on recent CT, 12/2021. The patient is a 46 y.o.  female who is suspected to have fatty liver disease based upon imaging studies. The CT scan was performed in 4/2015 to evalaute the spleen because of thrombocytosis. This demonstrated a 4.7 cm mass in segment 5 of the right lobe with no rim enhancement. An MRI of the liver as performed in 4/2015. THis also demonstrated a 4.7 enhancing mass with washout but no delayed rim enhancement most consistent with FNH.       Another MRI was performed in 11/2015 showing resolution of \"mass\" and normal tissue in that area. Serologic evaluation for various causes of liver disease was either all negative or within the limits of normal.      A liver biopsy has not been performed. The most recent laboratory studies indicate that the liver transaminases are normal, ALP is normal, tests of hepatic synthetic and metabolic function are normal, and the platelet count is elevated. The patient has had ongoing mild RUQ \"bruisy\" pain since ~8/2021 that prompted her to mention to PCP and her hematologist.  This is not related to food, bowel movement or body positioning that she can identify. She has noted some improvement in symptoms since starting a trial of omeprazole and reports that she is scheduled for EGD and a first screening colonoscopy in 6/2022. The patient has no limitations in functional activities. ALLERGIES  Allergies   Allergen Reactions    Crestor [Rosuvastatin] Myalgia     Insomnia and muscle pain    Flonase [Fluticasone] Other (comments)     Cataracts enlarged    Hydrochlorothiazide Other (comments)     Insomnia, headache, ringing in ears, anxious    Nortriptyline Other (comments)     Increased depression    Other Medication Other (comments)     Pt is avoiding Nasal CS d/t cataracts    Sulfa (Sulfonamide Antibiotics) Hives       MEDICATIONS  Current Outpatient Medications   Medication Sig    sertraline (ZOLOFT) 50 mg tablet TAKE ONE AND ONE-HALF TABLETS DAILY    ALPRAZolam (XANAX) 0.5 mg tablet TAKE 1/2 TABLETS BY MOUTH NIGHTLY AS NEEDED FOR ANXIETY OR SLEEP. MAX DAILY AMOUNT: 0.25 MG.  spironolactone (ALDACTONE) 50 mg tablet TAKE 1 TABLET BY MOUTH EVERY DAY    Advair Diskus 100-50 mcg/dose diskus inhaler USE 1 INHALATION EVERY 12 HOURS    amLODIPine (NORVASC) 5 mg tablet TAKE 1 TABLET BY MOUTH EVERY DAY    atorvastatin (LIPITOR) 20 mg tablet Take 1 Tablet by mouth every evening.  MILK THISTLE PO Take  by mouth daily.  KRILL OIL PO Take  by mouth daily.     ubidecarenone (CO Q-10 PO) Take  by mouth daily.  cyclobenzaprine (FLEXERIL) 10 mg tablet Take 1 Tab by mouth every eight (8) hours as needed for Muscle Spasm(s).  fluticasone propionate (FLONASE NA) by Nasal route daily.  albuterol (VENTOLIN HFA) 90 mcg/actuation inhaler Take 2 Puffs by inhalation every six (6) hours as needed for Wheezing.  turmeric (CURCUMIN) Take  by mouth daily.  cranberry extract 450 mg tab tablet Take 450 mg by mouth.  garlic cap Take  by mouth.  SLO-NIACIN PO Take 500 mg by mouth daily.  loratadine (CLARITIN) 10 mg tablet Take 10 mg by mouth daily.  aspirin delayed-release 81 mg tablet Take  by mouth daily.  levonorgestrel-ethinyl estradiol (SEASONALE) 0.15 mg-30 mcg (91) 3MPk TAKE 1 TABLET DAILY    ascorbic acid, vitamin C, (VITAMIN C) 1,000 mg tablet Take 1,000 mg by mouth daily. (Patient not taking: Reported on 3/7/2022)     No current facility-administered medications for this visit. SYSTEM REVIEW NOT RELATED TO LIVER DISEASE OR REVIEWED ABOVE:  Constitution systems: Negative for fever, chills. Weight gain of ~20-25# in the past several years. Eyes: Negative for visual changes. ENT: Negative for sore throat, painful swallowing. Respiratory: Negative for cough, hemoptysis, SOB. Cardiology: Negative for chest pain, palpitations. GI:  Negative for constipation or diarrhea. Positive for RUQ pain. : Negative for urinary frequency, dysuria, hematuria, nocturia. Skin: Negative for rash. Hematology: Negative for easy bruising, blood clots. Musculo-skeletal: Negative for back pain, muscle pain, weakness. Neurologic: Negative for headaches, dizziness, vertigo, memory problems not related to HE. Psychology: Negative for anxiety, depression. FAMILY HISTORY:  A grandfather  of liver cancer  The father has the following chronic diseases: hodgkins disease. The mother has the following chronic diseases: HTN.       SOCIAL HISTORY:  The patient has never been . The patient has no children. The patient has never used tobacco products. The patient has never consumed significant amounts of alcohol. 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 PHYSICAL EXAMINATION:  Visit Vitals  BP (!) 148/94 (BP 1 Location: Left upper arm, BP Patient Position: Sitting, BP Cuff Size: Adult)   Pulse 81   Temp 97.3 °F (36.3 °C) (Temporal)   Ht 5' 6\" (1.676 m)   Wt 232 lb 3.2 oz (105.3 kg)   BMI 37.48 kg/m²     General: No acute distress. Eyes: Sclera anicteric. ENT: No oral lesions. Thyroid normal.  Nodes: No adenopathy. Skin: No spider angiomata. No jaundice. No palmar erythema. Respiratory: Lungs clear to auscultation. Cardiovascular: Regular heart rate. No murmurs. No JVD. Abdomen: Soft, mild tender with deep palpation in the epigastrium. Liver size normal to percussion/palpation. Spleen not palpable. No obvious ascites. Extremities: No edema. No muscle wasting. No gross arthritic changes. Neurologic: Alert and oriented. Cranial nerves grossly intact. No asterixis.     LABORATORY STUDIES:  [place] [place] [address] Units 11/8/2021 6/7/2021   WBC 3.4 - 10.8 x10E3/uL 12.8 (H)    ANC 1.4 - 7.0 x10E3/uL 6.8    HGB 11.1 - 15.9 g/dL 14.0     - 450 x10E3/uL 590 (H)    PLT      AST 0 - 40 IU/L 20 20   ALT 0 - 32 IU/L 24 29   Alk Phos 44 - 121 IU/L 103 98   Bili, Total 0.0 - 1.2 mg/dL 0.3 0.4   Bili, Direct 0.00 - 0.40 mg/dL     Albumin 3.8 - 4.9 g/dL 4.8 3.8   BUN 6 - 24 mg/dL 14 18   Creat 0.57 - 1.00 mg/dL 0.80 0.75   Na 134 - 144 mmol/L 137 134 (L)   K 3.5 - 5.2 mmol/L 4.6 4.7   Cl 96 - 106 mmol/L 102 105   CO2 20 - 29 mmol/L 21 23   Glucose 65 - 99 mg/dL 89 94     [facility] [place] of [address] [place] [address] Ref Rng & Units 12/31/2020   WBC 3.4 - 10.8 x10E3/uL    ANC 1.4 - 7.0 x10E3/uL    HGB 11.1 - 15.9 g/dL     - 450 x10E3/uL    PLT     AST 0 - 40 IU/L 28   ALT 0 - 32 IU/L 46 Alk Phos 44 - 121 IU/L 94   Bili, Total 0.0 - 1.2 mg/dL 0.5   Bili, Direct 0.00 - 0.40 mg/dL    Albumin 3.8 - 4.9 g/dL 4.1   BUN 6 - 24 mg/dL 13   Creat 0.57 - 1.00 mg/dL 0.82   Na 134 - 144 mmol/L 131 (L)   K 3.5 - 5.2 mmol/L 4.6   Cl 96 - 106 mmol/L 104   CO2 20 - 29 mmol/L 22   Glucose 65 - 99 mg/dL 97     SEROLOGIES:  5/2015. HAV total negative, anti-HBsurface positive, HCV RNA undetectable, Ferritin 124, DIMPLE negative,     LIVER HISTOLOGY:  3/2022. [facility]. EkPa was 3.5. Suggested fibrosis level is F0-1. CAP score is 300, this is consistent with steatosis. ENDOSCOPIC PROCEDURES:  Not available or performed    RADIOLOGY:  4/2015. CT scan abdomen with and without IV contrast.  Changes consistent with fatty liver. Enhancing liver mass with washout and no rim enhancement on delayed images in the right lobe measuring 4.7 cm.    4/2015. Dynamic MRI of liver. Changes consistent with fatty liver. Enhancing liver mass with washout and no rim enhancement on delayed images in the right lobe measuring 4.7 cm. Most consistent with focal nodular hyperplasia. 11/2015. Dynamic MRI of liver. Resolution of all fatty liver except for small area of hypodensity in segment 5. The previously seen mass in the right lobe has now resolved and the area is consistent with normal appearing liver. 12/2021. CT abdomen. Hepatic steatosis. Hypodensity of liver, not mass-like and unchanged in appearance from 2015. OTHER TESTING:  Not available or performed    ASSESSMENT AND PLAN:  NAFL. Suspected fatty liver disease supported by Fibroscan and imaging. The previously seen mass in the right lobe which was thought to be a focal nodular hyperplasia in retrospect was thought to represent a focal fatty infiltraiton and has now resolved. She continues to have generalized steatosis suggested on the 12/2021 CT scan and may continue to have area of focal fatty sparing.      She is very relieved to learn that there is no suggestion of advanced fibrosis on today's Fibroscan. I have asked her to focus on weight reduction with a goal of ~10% loss, or 20-25# this year. She is in agreement and ready to make some dietary changes. I suspect that some of the abdominal pain may be related to fatty liver/capsule stretch. Liver transaminases are normal.  Alkaline phosphate is normal.  Liver function is normal.      No additional imaging of the liver indicated at this time. I have reviewed her past imaging in detail with the patient, including the recent CT from 12/2021. The patient was directed to continue all current medications at the current dosages. There are no contraindications for the patient to take any medications that are necessary for treatment of other medical issues including medications for diabetes mellitus and hypercholesterolemia. The patient was counseled regarding alcohol consumption and that this could contribute to fatty liver disease. Epigastric/RUQ pain. As above, this may be due to hepatomegaly from NAFL, but she has reported improvement with use of PPI. I have encouraged her to keep the EGD evaluation in 6/2022 as well. All of the above issues were discussed with the patient. All questions were answered. The patient expressed a clear understanding of the above. [facility] 87 in 6 months for virtual check in and monitoring of labs. Documentation reviewed and updated to reflect current, accurate patient information.     George Hedrick PA-C  Liver [facility] 59, [facility] 22.  [phone]  [address] [place] [address]

## 2022-03-18 PROBLEM — G44.209 MUSCLE CONTRACTION HEADACHE: Status: ACTIVE | Noted: 2017-04-10

## 2022-03-19 PROBLEM — Z79.899 CONTROLLED SUBSTANCE AGREEMENT SIGNED: Status: ACTIVE | Noted: 2018-05-24

## 2022-03-19 PROBLEM — F51.05 HYPOSOMNIA, INSOMNIA OR SLEEPLESSNESS ASSOCIATED WITH ANXIETY: Status: ACTIVE | Noted: 2018-12-26

## 2022-03-19 PROBLEM — F41.9 HYPOSOMNIA, INSOMNIA OR SLEEPLESSNESS ASSOCIATED WITH ANXIETY: Status: ACTIVE | Noted: 2018-12-26

## 2022-03-20 PROBLEM — R73.03 PREDIABETES: Status: ACTIVE | Noted: 2017-04-23

## 2022-04-15 DIAGNOSIS — I10 BENIGN ESSENTIAL HYPERTENSION: ICD-10-CM

## 2022-04-15 RX ORDER — AMLODIPINE BESYLATE 5 MG/1
TABLET ORAL
Qty: 30 TABLET | Refills: 1 | Status: SHIPPED | OUTPATIENT
Start: 2022-04-15 | End: 2022-05-08

## 2022-04-29 ENCOUNTER — TELEPHONE (OUTPATIENT)
Dept: FAMILY MEDICINE CLINIC | Age: 52
End: 2022-04-29

## 2022-04-29 ENCOUNTER — NURSE TRIAGE (OUTPATIENT)
Dept: OTHER | Facility: CLINIC | Age: 52
End: 2022-04-29

## 2022-04-29 ENCOUNTER — VIRTUAL VISIT (OUTPATIENT)
Dept: FAMILY MEDICINE CLINIC | Age: 52
End: 2022-04-29

## 2022-04-29 NOTE — TELEPHONE ENCOUNTER
Received call from [facility] at [facility] with Red Flag Complaint. Subjective: Caller states \"I am having right lower abdominal pain, behind the pelvic bone. \"     Current Symptoms: almost constant RLQ abdomen pain, Feels better with pressure. Denies radiation to other sites. Eating does not make worse. Diarrhea this morning- began normal but became watery. NO bloating, nausea    Onset: August 2021  worsening, waxing and waning, Getting more frequent in occurrence. Associated Symptoms: diarrhea    Pain Severity: 5/10; aching; becoming more frequent. Temperature: unknown     What has been tried: tylenol for a headache and it helped the abdominal pain    LMP: NA Pregnant: No    Recommended disposition: Go to [address] [place] [address] Now (Or to Office with PCP Approval)    [address] [place] [address] office and spoke with Kristina. Warm transferred patient to her. Care advice provided, patient verbalizes understanding; denies any other questions or concerns; instructed to call back for any new or worsening symptoms. Attention Provider: Thank you for allowing me to participate in the care of your patient. The patient was connected to triage in response to information provided to the [facility]. Please do not respond through this encounter as the response is not directed to a shared pool.         Reason for Disposition   Constant abdominal pain lasting > 2 hours    Protocols used: ABDOMINAL PAIN - FEMALE-ADULT-[place]

## 2022-04-29 NOTE — TELEPHONE ENCOUNTER
Patient had [facility] to let us know patient had virtual today and stated she had to get back to work because the provider did not send text. Would like for the provider to give he a call.     Best call back #[phone]

## 2022-04-30 NOTE — PROGRESS NOTES
Erroneous encounter. Patient had to cancel virtual visit as provider was running behind and patient had to get back to work. Message sent to nurse to call patient directly. See separate message encounter.
2020

## 2022-05-08 DIAGNOSIS — I10 BENIGN ESSENTIAL HYPERTENSION: ICD-10-CM

## 2022-05-08 RX ORDER — AMLODIPINE BESYLATE 5 MG/1
TABLET ORAL
Qty: 30 TABLET | Refills: 1 | Status: SHIPPED | OUTPATIENT
Start: 2022-05-08 | End: 2022-06-04

## 2022-05-10 DIAGNOSIS — F41.8 DEPRESSION WITH ANXIETY: ICD-10-CM

## 2022-05-10 DIAGNOSIS — F41.0 PANIC ATTACKS: ICD-10-CM

## 2022-05-10 RX ORDER — SERTRALINE HYDROCHLORIDE 50 MG/1
TABLET, FILM COATED ORAL
Qty: 135 TABLET | Refills: 0 | Status: SHIPPED | OUTPATIENT
Start: 2022-05-10 | End: 2022-08-08

## 2022-05-12 ENCOUNTER — TELEPHONE (OUTPATIENT)
Dept: FAMILY MEDICINE CLINIC | Age: 52
End: 2022-05-12

## 2022-05-12 DIAGNOSIS — I10 BENIGN ESSENTIAL HYPERTENSION: ICD-10-CM

## 2022-05-12 RX ORDER — SPIRONOLACTONE 50 MG/1
TABLET, FILM COATED ORAL
Qty: 90 TABLET | Refills: 0 | Status: SHIPPED | OUTPATIENT
Start: 2022-05-12 | End: 2022-08-05

## 2022-05-12 NOTE — TELEPHONE ENCOUNTER
MD Hafsa Burns. Fax from [facility] requesting allergy information of \"other\" listed on patient profile. Noted the \"other\" is listed as:  Patient avoiding nasal CS due to cataracts. Not pertaining to the sertraline sent on 5/10/22. Contacted [facility] to advise of the \"other \" info and they are filling the sertraline. Just let me know if something different.   Thanks, Nicola Shukla

## 2022-05-24 PROBLEM — R42 VERTIGO: Status: ACTIVE | Noted: 2022-05-24

## 2022-06-04 DIAGNOSIS — I10 BENIGN ESSENTIAL HYPERTENSION: ICD-10-CM

## 2022-06-04 RX ORDER — AMLODIPINE BESYLATE 5 MG/1
TABLET ORAL
Qty: 30 TABLET | Refills: 0 | Status: SHIPPED | OUTPATIENT
Start: 2022-06-04 | End: 2022-06-27

## 2022-06-08 DIAGNOSIS — E78.2 HYPERCHOLESTEROLEMIA WITH HYPERTRIGLYCERIDEMIA: ICD-10-CM

## 2022-06-08 RX ORDER — ATORVASTATIN CALCIUM 20 MG/1
TABLET, FILM COATED ORAL
Qty: 30 TABLET | Refills: 0 | Status: SHIPPED | OUTPATIENT
Start: 2022-06-08 | End: 2022-07-02

## 2022-06-27 DIAGNOSIS — I10 BENIGN ESSENTIAL HYPERTENSION: ICD-10-CM

## 2022-06-27 RX ORDER — AMLODIPINE BESYLATE 5 MG/1
TABLET ORAL
Qty: 30 TABLET | Refills: 0 | Status: SHIPPED | OUTPATIENT
Start: 2022-06-27 | End: 2022-07-06 | Stop reason: SDUPTHER

## 2022-07-01 DIAGNOSIS — F41.0 GENERALIZED ANXIETY DISORDER WITH PANIC ATTACKS: ICD-10-CM

## 2022-07-01 DIAGNOSIS — F41.9 HYPOSOMNIA, INSOMNIA OR SLEEPLESSNESS ASSOCIATED WITH ANXIETY: ICD-10-CM

## 2022-07-01 DIAGNOSIS — F51.05 HYPOSOMNIA, INSOMNIA OR SLEEPLESSNESS ASSOCIATED WITH ANXIETY: ICD-10-CM

## 2022-07-01 DIAGNOSIS — F41.1 GENERALIZED ANXIETY DISORDER WITH PANIC ATTACKS: ICD-10-CM

## 2022-07-01 RX ORDER — ALPRAZOLAM 0.5 MG/1
TABLET ORAL
Qty: 15 TABLET | Refills: 2 | Status: SHIPPED | OUTPATIENT
Start: 2022-07-01 | End: 2022-09-30

## 2022-07-02 DIAGNOSIS — E78.2 HYPERCHOLESTEROLEMIA WITH HYPERTRIGLYCERIDEMIA: ICD-10-CM

## 2022-07-02 RX ORDER — ATORVASTATIN CALCIUM 20 MG/1
TABLET, FILM COATED ORAL
Qty: 30 TABLET | Refills: 0 | Status: SHIPPED | OUTPATIENT
Start: 2022-07-02 | End: 2022-07-26

## 2022-07-06 ENCOUNTER — OFFICE VISIT (OUTPATIENT)
Dept: FAMILY MEDICINE CLINIC | Age: 52
End: 2022-07-06
Payer: COMMERCIAL

## 2022-07-06 VITALS
WEIGHT: 234.4 LBS | DIASTOLIC BLOOD PRESSURE: 86 MMHG | HEART RATE: 89 BPM | SYSTOLIC BLOOD PRESSURE: 132 MMHG | RESPIRATION RATE: 16 BRPM | OXYGEN SATURATION: 98 % | TEMPERATURE: 98.3 F | BODY MASS INDEX: 37.67 KG/M2 | HEIGHT: 66 IN

## 2022-07-06 DIAGNOSIS — I10 BENIGN ESSENTIAL HYPERTENSION: ICD-10-CM

## 2022-07-06 DIAGNOSIS — Z79.899 ENCOUNTER FOR MEDICATION MANAGEMENT: ICD-10-CM

## 2022-07-06 DIAGNOSIS — R73.03 PREDIABETES: ICD-10-CM

## 2022-07-06 DIAGNOSIS — Z79.899 CONTROLLED SUBSTANCE AGREEMENT SIGNED: ICD-10-CM

## 2022-07-06 DIAGNOSIS — K76.0 NAFL (NONALCOHOLIC FATTY LIVER): ICD-10-CM

## 2022-07-06 DIAGNOSIS — J45.40 MODERATE PERSISTENT ASTHMA WITHOUT COMPLICATION: ICD-10-CM

## 2022-07-06 DIAGNOSIS — E78.2 MIXED HYPERLIPIDEMIA: Primary | ICD-10-CM

## 2022-07-06 DIAGNOSIS — F51.05 HYPOSOMNIA, INSOMNIA OR SLEEPLESSNESS ASSOCIATED WITH ANXIETY: ICD-10-CM

## 2022-07-06 DIAGNOSIS — Z30.41 ENCOUNTER FOR BIRTH CONTROL PILLS MAINTENANCE: ICD-10-CM

## 2022-07-06 DIAGNOSIS — F41.9 HYPOSOMNIA, INSOMNIA OR SLEEPLESSNESS ASSOCIATED WITH ANXIETY: ICD-10-CM

## 2022-07-06 DIAGNOSIS — F32.A CHRONIC DEPRESSION: ICD-10-CM

## 2022-07-06 DIAGNOSIS — F41.0 GENERALIZED ANXIETY DISORDER WITH PANIC ATTACKS: ICD-10-CM

## 2022-07-06 DIAGNOSIS — F41.1 GENERALIZED ANXIETY DISORDER WITH PANIC ATTACKS: ICD-10-CM

## 2022-07-06 DIAGNOSIS — Z51.81 ENCOUNTER FOR MEDICATION MONITORING: ICD-10-CM

## 2022-07-06 PROBLEM — R25.1 EXCESSIVE PHYSIOLOGIC TREMOR: Status: ACTIVE | Noted: 2022-07-06

## 2022-07-06 PROCEDURE — 99215 OFFICE O/P EST HI 40 MIN: CPT | Performed by: FAMILY MEDICINE

## 2022-07-06 RX ORDER — OMEPRAZOLE 20 MG/1
20 CAPSULE, DELAYED RELEASE ORAL DAILY
COMMUNITY

## 2022-07-06 RX ORDER — ALBUTEROL SULFATE 90 UG/1
2 AEROSOL, METERED RESPIRATORY (INHALATION)
Status: CANCELLED | OUTPATIENT
Start: 2022-07-06

## 2022-07-06 RX ORDER — ALPRAZOLAM 0.5 MG/1
TABLET ORAL
Qty: 15 TABLET | Refills: 2 | Status: CANCELLED | OUTPATIENT
Start: 2022-07-06

## 2022-07-06 RX ORDER — AMLODIPINE BESYLATE 5 MG/1
5 TABLET ORAL DAILY
Qty: 90 TABLET | Refills: 3 | Status: SHIPPED | OUTPATIENT
Start: 2022-07-06

## 2022-07-06 RX ORDER — LEVONORGESTREL AND ETHINYL ESTRADIOL 0.15-0.03
KIT ORAL
Qty: 3 DOSE PACK | Refills: 1 | Status: SHIPPED | OUTPATIENT
Start: 2022-07-06

## 2022-07-06 RX ORDER — ALBUTEROL SULFATE 90 UG/1
2 AEROSOL, METERED RESPIRATORY (INHALATION)
Qty: 1 EACH | Refills: 1 | Status: SHIPPED | OUTPATIENT
Start: 2022-07-06

## 2022-07-06 NOTE — LETTER
Safia Valenzuela  EIN:[birth year]   MR #:[id number]   [facility] [address]   Page 1 of 5        CONTROLLED SUBSTANCE AGREEMENT     I may be prescribed medications that are controlled substances as part  of my treatment plan for management of my medical condition(s). The goal of my treatment plan is to maintain and/or improve my health and wellbeing. Because controlled substances have an increased risk of abuse or harm, continual re-evaluation is needed determine if the goals of my treatment plan are being met for my safety and the safety of others. Brooklynn Izaguirre  am entering into this Controlled Substance Agreement with my provider, __Latonya Meehan MD________ at [facility] . I understand that successful treatment requires mutual trust and honesty between me and my provider. I understand that there are state and federal laws and regulations which apply to the medications that my provider may prescribe that must be followed. I understand there are risks and benefits ts of taking the medicines that my provider may prescribe. I understand and agree that following this Agreement is necessary in continuing my provider-patient relationship and success of my treatment plan. As a part of my treatment plan, I agree to the following:    COMMUNICATION:    1. I will communicate fully with my provider about my medical condition(s), including the effect on my daily life and how well my medications are helping. I will tell my provider all of the medications that I take for any reason, including medications I receive from another health care provider, and will notify my provider about all issues, problems or concerns, including any side effects, which may be related to my medications. I understand that this information allows my provider to adjust my treatment plan to help manage my medical condition.  I understand that this information will become part of my permanent medical record. 2. I will notify my provider if I have a history of alcohol/drug misuse/addiction or if I have had treatment for alcohol/drug addiction in the past, or if I have a new problem with or concern about alcohol/drug use/addiction, because this increases the likelihood of high risk behaviors and may lead to serious medical conditions. 3. Females Only: I will notify my provider if I am or become pregnant, or if I intend to become pregnant, or if I intend to breastfeed. I understand that communication of these issues with my provider is important, due to possible effects my medication could have on an unborn fetus or breastfeeding child. Initials_____      Name:.Mayela Yip   NWM:[birth year]   MR #:[id number]   Office:[facility]   Page 2 of 5       MISUSE OF MEDICATIONS / DRUGS:    1. I agree to take all controlled substances as prescribed, and will not misuse or abuse any controlled substances prescribed by my provider. For my safety, I will not increase the amount of medicine I take without first talking with and getting permission from my provider. 2. If I have a medical emergency, another health care provider may prescribe me medication. If I seek emergency treatment, I will notify my provider within seventy-two (72) hours. 3. I understand that my provider may discuss my use and/or possible misuse/abuse of controlled substances and alcohol, as appropriate, with any health care provider involved in my care, pharmacist or legal authority. ILLEGAL DRUGS:    1. I will not use illegal drugs of any kind, including but not limited to marijuana, heroin, cocaine, or any prescription drug which is not prescribed to me. DRUG DIVERSION / PRESCRIPTION FRAUD:    1. I will not share, sell, trade, give away, or otherwise misuse my prescriptions or medications. 2. I will not alter any prescriptions provided to me by my provider.     SINGLE PROVIDER:    1. I agree that all controlled substances that I take will be prescribed only by my provider (or his/her covering provider) under this Agreement. This agreement does not prevent me from seeking emergency medical treatment or receiving pain management related to a surgery. PROTECTING MEDICATIONS:    1. I am responsible for keeping my prescriptions and medications in a safe and secure place including safeguarding them from loss or theft. I understand that lost, stolen or damaged/destroyed prescriptions or medications will not be replaced. Initials____          Name:Mary Ann Paige   WGY:[birth year]   MR #:[id number]   [facility]    Page 3 of 5   PRESCRIPTION RENEWALS/REFILLS:    1. I will follow my controlled substance medication schedule as prescribed by my provider. 2. I understand and agree that I will make any requests for renewals or refills of my prescriptions only at the time of an office visit or during my providers regular office hours subject to the prescription refill requirements of the individual practice. 3. I understand that my provider may not call in prescriptions for controlled substances to my pharmacy. 4. I understand that my provider may adjust or discontinue these medications as deemed appropriate for my medical treatment plan. This Agreement does not guarantee the prescription of controlled medications. 5. I agree that if my medications are adjusted or discontinued, I will properly dispose of any remaining medications. I understand that I will be required to dispose of any remaining controlled medications prior to being provided with any prescriptions for other controlled medications. 6. I understand that the renewal of my prescription depends on my medical condition, my consistent participation, and my adherence with my treatment plan and this Agreement.     7. I understand that if I do not keep an appointment with my provider, I may not receive a renewal or refill for my controlled substance medication. PRESCRIPTION MONITORING / DRUG TESTIN. I understand that my provider may require me to provide urine, saliva or blood for testing at any time. I understand that this testing will be used to monitor for safety and adherence with my treatment plan and this Agreement. 2. I understand that my provider may ask me to provide an observed urine specimen, which means that a nurse or other health care provider may watch me provide urine, and I agree to cooperate if I am asked to provide an observed specimen. 3. I understand that if I do not provide urine, saliva or blood samples within two (2) hours of my providers request, or other timeframe decided by my provider, my treatment plan could be changed, or my prescriptions and medications may be changed or ended. 4. I understand that urine, saliva and blood test results will be a part of my permanent medical record. Initials_____        Name:Mary Ann Alexander Lot   WRQ:   MR #:[id number]   [facility] [address]   Page 4 of 5    5. I understand that my provider is required to obtain a copy of my State Prescription Monitoring Program () Report at any time in order to safely prescribe medications. 6. I will bring all of my prescribed controlled substance medications in their original bottles to all of my scheduled appointments. 7. I understand that my provider may ask me to come to the practice with all of my prescribed medications for a random pill count at any time. I agree to cooperate if I am asked to come in for a random pill count. I understand that if I do not arrive in the timeframe decided by my provider, my treatment plan could be changed, or my prescriptions and medications may be changed or ended.     COOPERATION WITH INVESTIGATIONS:    1. I authorize my provider and my pharmacy to cooperate fully with any local, state, or federal law enforcement agency in the investigation of any possible misuse, sale, or other diversion of my controlled substance prescriptions or medications. RISKS:    1. I understand that my level of consciousness may be affected from the use of controlled substances, and I understand that there are risks, benefits, effects and potential alternatives (including no treatment) to the medications that my provider has prescribed. 2. I understand that I may become drowsy, tired, dizzy, constipated, and sick to my stomach, or have changes in my mood or in my sleep while taking my medications. I have talked with my provider about these possible side effects, risks, benefits, and alternative treatments, and my provider has answered all of my questions. 3. I understand that I should not suddenly stop taking my medications without first speaking with my provider. I understand that if I suddenly stop taking my medications, I may experience nausea, vomiting, sweating,anxiety, sleeplessness, itching or other uncomfortable feelings. 4. I will not take my medications with alcohol of any kind, including beer, wine or liquor. I understand that drinking alcohol with my medications increases the chances of side effects, including breathing problems or even death. 5. I understand that if I have a history of alcoholism or other drug addiction I may be at increased risk of addiction to my medications. Signs of addiction might include craving, compulsive use, and continued use despite harm. Since addiction is a disease, I understand my provider may decide to change my medications and refer me to appropriate treatment services. I understand that this information would become part of my permanent medical record. Initials_____        Name:Mary Ann Veliz   MJR:[birth year]   MR #:[id number]   [facility] [address]   Page 5 of 5       6.  Females only: Children born to women who regularly take controlled substances are likely to have physical problems and suffer withdrawal symptoms at birth. If I am of child-bearing age, I understand that I should use safe and effective birth control while taking any controlled substances to avoid the impact of medications on an unborn fetus or  child. I agree to notify my provider immediately if I should become pregnant so that my treatment plan can be adjusted. 7. Males only: I understand that chronic use of controlled substances has been associated with low testosterone levels in males which may affect my mood, stamina, sexual desire, and general health. I understand that my provider may order the appropriate laboratory test to determine my testosterone level,and I agree to this testing. ADHERENCE:    1. I understand that if I do not adhere to this Agreement in any way, my provider may change my prescriptions, stop prescribing controlled substances or end our provider-patient relationship. 2. If my provider decides to stop prescribing medication, or decides to end our provider-patient relationship,my provider may require that I taper my medications slowly. If necessary, my provider may also provide a prescription for other medications to treat my withdrawal symptoms. UNDERSTANDING THIS AGREEMENT:    I understand that my provider may adjust or stop my prescriptions for controlled substances based on my medical condition and my treatment plan. I understand that this Agreement does not guarantee that I will be prescribed medications or controlled substances. I understand that controlled substances may be just one part  of my treatment plan. My initial on each page and my signature below shows that I have read each page of this Agreement, I have had an opportunity to ask questions, and all of my questions have been answered to my satisfaction by my provider.     By signing below, I agree to comply with this Agreement, and I understand that if I do not follow the Agreements listed above, my provider may stop    _________________________________________  Date/Time 7/6/2022 2:04 PM                 (Patient Signature)    ________________________________________    Date/Time 7/6/2022 2:04 PM   (Parent or Guardian Signature if <18 yrs)    _________________________________________ Date/Time 7/6/2022 2:04 PM   (Provider Signature)

## 2022-07-06 NOTE — PROGRESS NOTES
Chief Complaint   Patient presents with    Cholesterol Problem     Not fasting    Hypertension    Depression    Anxiety    Medication Check       HISTORY OF PRESENT ILLNESS   HPI  Follow up hypercholesterolemia, hypertension, prediabetes. Not fasting today. Complying routinely w/ medication regimen updated below and tolerating w/o reaction or side effects. No interim BP's. Diet: nothing special, admits eating habits not good for a while now  Exercise: none for a long time, just stays active at work; they have full exercise equipment at home but she admits she doesn't use it   Caffeine: 36 oz sodas a day ([place])  Weight: had been going up over time since not eating healthy or exercising regularly and now maintaining in the 230's    Follow up depression, anxiety and medication check  Currently continues on Zoloft 75 mg. Admits that back in April, she was having a really \"rough month\" with some situational depression. Was really feeling down, low. But not suicidal or self harm. She bumped up her Zoloft to 100 mg but felt like a zombie and didn't like the way she felt on that regimen so she went back down to the 75 mg dose and just managed to get through the situation on her own by \"toughing it out\". She didn't feel she needed or wanted counseling. She did counseling about 6 yrs ago at the mandate of her supervisor at work back then but otherwise has not done counseling in a long time. She feels she is in a better place right now and nothing more needed. Doing ok back on her usual regimen. She continues to take the Xanax 0.5 mg 1/2 tablet nightly for sleep. It helps her fall asleep and stay asleep soundly. Keeps her mind from racing all night, decreases night time panic attacks and enables her to rest so that she can awaken feeling refreshed and functional for the following day. Updates interim history:  Had upper and lower endoscopy w/ Dr. Roverto Gallegos last month.   States upper endoscopy was \"ok\", biopsies negative, so her Prilosec was decreased from 40 mg to 20 mg. She feels reassured that everything was ok and doing better now. Colonoscopy tubular adenoma, repeat 5 yrs. Normal bowel habits    Went to [facility] ER 5/23 for acute vertigo, N/V. States at CT scan, labs and EKG were done and negative. Treated effectively in ER w/ meds and no recurrences since. Wondering if she had a vestibular migraine. She has h/o tension and migraine headaches in the past.  She consulted w/ her naturopath who recommended she start taking Calcium so ~ 5/30/22 patient started taking Calcium-Mag supplement daily. She does have h/o mild calcium elevation in past. Forgot about this. Will stop the calcium and have her labs checked in a few weeks. Used to see Neurology Dr. Mykel Sandoval but not seen there since ? 2017. She also was seen by Dr. Mykel Sandoval in the past for worsening physiologic tremor. Patient feels like this is getting bad again. Wondering if PT would help. Leery of meds in the past due to their possible side effects. REVIEW OF SYMPTOMS   Review of Systems   Constitutional: Negative. HENT: Negative. Respiratory: Negative. Needs Albuterol Inhaler renewed. She seldom needs it, maybe 2-3 x a year. Current rx outdated. Cardiovascular: Negative. Gastrointestinal: Negative. Negative for constipation and diarrhea. Genitourinary: Negative. Endo/Heme/Allergies: Negative. Psychiatric/Behavioral: Negative.             PROBLEM LIST/MEDICAL HISTORY     Problem List  Date Reviewed: 7/6/2022          Codes Class Noted    Benign essential hypertension ICD-10-CM: I10  ICD-9-CM: 401.1  2/24/2015    Overview Signed 2/24/2015 10:21 AM by Ken Perry MD     [id number]             Excessive physiologic tremor ICD-10-CM: R25.1  ICD-9-CM: 333.1  7/6/2022    Overview Signed 7/6/2022  3:14 PM by Ken Perry MD     Neurology Evaluation 2016 Dr. Megan Ramos ICD-10-CM: R42  ICD-9-CM: 780.4  5/24/2022    Overview Signed 5/24/2022  8:53 PM by Jonelle Florence MD     [facility] [phone], Labs, EKG, head CT negative; treated effectively w/ meds             Hyposomnia, insomnia or sleeplessness associated with anxiety ICD-10-CM: F51.05, F41.9  ICD-9-CM: 293.84, 327.02  12/26/2018        Controlled substance agreement signed ICD-10-CM: Z79.899  ICD-9-CM: V58.69  5/24/2018    Overview Addendum 12/31/2020 12:07 PM by Jonelle Florence MD     2017 and annually              Prediabetes ICD-10-CM: R73.03  ICD-9-CM: 790.29  4/23/2017    Overview Signed 4/23/2017  9:16 PM by Jonelle Florence MD     Mild [id number]             Muscle contraction headaches & Neck Muscle Spasms ICD-10-CM: P26.524  ICD-9-CM: 307.81  4/10/2017    Overview Signed 7/6/2022  3:14 PM by Jonelle Florence MD     2016 Neurology Dr. Contreras Mills             Thrombocytosis ICD-10-CM: B70.987  ICD-9-CM: 238.71  3/22/2016    Overview Addendum 5/24/2018 10:44 AM by Jonelle Florence MD     Evaluation Hem-Onc Dr Morris Sanabria 2015: Based on the bone marrow biopsy and the lab results there is no clear reason for her thrombocytosis. There is no sign of a primary hematologic disorder. This is likely secondary thrombocytosis versus normal for her. All of the results that we have for her have been high-normal.  This makes malignancy less likely. She has seen rheumatology and there is no sign of autoimmune disorder. Imaging negative for malignancy. Lymphocytosis ICD-10-CM: D97.664  ICD-9-CM: 288.61  3/22/2016    Overview Addendum 7/6/2022  3:12 PM by MD Dr Morris Gunderson, 2015, then Dr. Johana Carmona: R31.29  ICD-9-CM: 599.72  12/15/2015    Overview Signed 12/15/2015  3:47 PM by Jonelle Florence MD     [id number] [facility] Dr Nadeem Rahman.  KUB neg excpet small stone vs phlebolith             NAFL (nonalcoholic fatty liver) CTE-52-FS: K76.0  ICD-9-CM: 571.8  8/27/2015    Overview Signed 8/27/2015  8:45 AM by MD Dr Clarita Blair             Focal nodular hyperplasia of liver ICD-10-CM: K76.89  ICD-9-CM: 573.8  5/10/2015    Overview Signed 8/27/2015  8:45 AM by Gregoria Mccloud MD     GI Dr Clarita Velazquez and Onc Dr Wilson Skiff             Vitamin D deficiency ICD-10-CM: E55.9  ICD-9-CM: 268.9  12/13/2011    Overview Signed 12/13/2011  1:16 PM by Gregoria Mccloud MD     10/2011             Mixed hyperlipidemia ICD-10-CM: H61.1  ICD-9-CM: 272.2  12/13/2011        Chronic Mechanical back pain ICD-10-CM: M54.9  ICD-9-CM: 724.5  10/25/2011        History of pyelonephritis, 1992 ICD-10-CM: Z87.448  ICD-9-CM: V13.02  10/25/2011        Allergic rhinitis ICD-10-CM: J30.9  ICD-9-CM: 477.9  10/25/2011    Overview Signed 10/25/2011  9:45 AM by Gregoria Mccloud MD     Worse in Spring             Cataract of right eye ICD-10-CM: H26.9  ICD-9-CM: 366.9  10/25/2011    Overview Signed 10/25/2011  9:46 AM by Gregoria Mccloud MD     Opth VEI, Dr Emilee Narvaez             S/P Dog bite, 2003 ICD-10-CM: W54. 0XXA  ICD-9-CM: E906.0  10/25/2011    Overview Signed 10/25/2011  9:46 AM by Gregoria Mccloud MD     Td and Rabies Series             Rosacea ICD-10-CM: L71.9  ICD-9-CM: 695.3  10/25/2011        Fibrocystic breast changes ICD-10-CM: N60.19  ICD-9-CM: 610.1  10/25/2011        Raynaud's syndrome ICD-10-CM: I73.00  ICD-9-CM: 443.0  Unknown    Overview Signed 8/27/2015  8:48 AM by Gregoria Mccloud MD     Autoimmune workup Dr Weston Gonzales Spring 2015             PMS (premenstrual syndrome) ICD-10-CM: N94.3  ICD-9-CM: 625.4  6/21/2010        Depression ICD-10-CM: V31. A  ICD-9-CM: 795  6/21/2010        Asthma, mild intermittent ICD-10-CM: J45.909  ICD-9-CM: 493.90  6/21/2010    Overview Signed 10/25/2011  9:45 AM by Gregoria Mccloud MD     Since ~ 22 yo             Primary dysmenorrhea ICD-10-CM: N94.4  ICD-9-CM: 625.3 6/21/2010        Anxiety attacks ICD-10-CM: F41.0  ICD-9-CM: 300.01  6/21/2010    Overview Signed 6/21/2010  8:28 PM by Rebeka Santamaria MD     March 2010                       PAST SURGICAL HISTORY     Past Surgical History:   Procedure Laterality Date    EKG TREADMILL STRESS TEST  May 2010    Dr Kira Cuellar and Dr Keron hodge for atypical CP; dx Anxiety    EMG TWO EXTREMITIES UPPER  1999    normal    HX COLONOSCOPY  06/15/2022    Tubular Adenoma, Repeat 5 yrs; Dr. Sherrine Claude HX ENDOSCOPY  06/15/2022    Dr. Sherrine Claude [facility]  3/2014 ordered by Dr Dsouza Apt, benign    HX SHOULDER ARTHROSCOPY  1996    left shoulder    US PELV NON OBS  2004    normal    XR SPINE LUMB 2 OR 3 V  1993    normal    XR SPINE THORAC 3 V  1993    normal    XR UPPER GI SERIES W KUB  2000    normal         MEDICATIONS     Current Outpatient Medications   Medication Sig    omeprazole (PRILOSEC) 20 mg capsule Take 20 mg by mouth daily.  calcium carb,gluc/mag ox,gluc (CALCIUM MAGNESIUM PO) Take  by mouth daily. Started 5/30/22    albuterol (Ventolin HFA) 90 mcg/actuation inhaler Take 2 Puffs by inhalation every six (6) hours as needed for Wheezing.  levonorgestrel-ethinyl estradiol (SEASONALE) 0.15 mg-30 mcg (91) 3MPk TAKE 1 TABLET DAILY    amLODIPine (NORVASC) 5 mg tablet Take 1 Tablet by mouth daily.  atorvastatin (LIPITOR) 20 mg tablet TAKE 1 TABLET BY MOUTH EVERY DAY IN THE EVENING    ALPRAZolam (XANAX) 0.5 mg tablet TAKE 1/2 TABLET BY MOUTH NIGHTLY AS NEEDED FOR ANXIETY OR SLEEP *MAX DAILY AMOUNT: 0.25MG*    spironolactone (ALDACTONE) 50 mg tablet TAKE 1 TABLET BY MOUTH EVERY DAY    sertraline (ZOLOFT) 50 mg tablet TAKE ONE AND ONE-HALF TABLETS DAILY    Advair Diskus 100-50 mcg/dose diskus inhaler USE 1 INHALATION EVERY 12 HOURS    MILK THISTLE PO Take  by mouth daily.  KRILL OIL PO Take  by mouth daily.  ubidecarenone (CO Q-10 PO) Take  by mouth daily.     cyclobenzaprine (FLEXERIL) 10 mg tablet Take 1 Tab by mouth every eight (8) hours as needed for Muscle Spasm(s).  fluticasone propionate (FLONASE NA) by Nasal route daily.  ascorbic acid, vitamin C, (VITAMIN C) 1,000 mg tablet Take 1,000 mg by mouth daily.  turmeric (CURCUMIN) Take  by mouth daily.  cranberry extract 450 mg tab tablet Take 450 mg by mouth.  garlic cap Take  by mouth.  SLO-NIACIN PO Take 500 mg by mouth daily.  loratadine (CLARITIN) 10 mg tablet Take 10 mg by mouth daily.  aspirin delayed-release 81 mg tablet Take  by mouth daily. No current facility-administered medications for this visit. ALLERGIES     Allergies   Allergen Reactions    Crestor [Rosuvastatin] Myalgia     Insomnia and muscle pain    Flonase [Fluticasone] Other (comments)     Cataracts enlarged    Hydrochlorothiazide Other (comments)     Insomnia, headache, ringing in ears, anxious    Nortriptyline Other (comments)     Increased depression    Other Medication Other (comments)     Pt is avoiding Nasal CS d/t cataracts    Sulfa (Sulfonamide Antibiotics) Hives          SOCIAL HISTORY     Social History     Tobacco Use    Smoking status: Former Smoker     Types: Cigarettes     Quit date: 10/25/1991     Years since quittin.7    Smokeless tobacco: Never Used    Tobacco comment: smoked lightly for about 5 months   Substance Use Topics    Alcohol use:  Yes     Alcohol/week: 0.0 standard drinks     Comment: very seldom     Social History     Social History Narrative    Life Partner     No children           Former Principal at [employer]    Since 18: New job at [place] [id number] as [employer], [employer]        Diet: nothing special, admits eating habits not good for a while now    Exercise: none for a long time, just stays active at work; they have full exercise equipment at home but she admits she doesn't use it     Caffeine: 36 oz sodas a day [employer] Dew)    Weight: had been going up over time since not eating healthy or exercising regularly and now maintaining in the 230's                 Social History     Substance and Sexual Activity   Sexual Activity Never    Partners: Female    Comment: Same sex partner       IMMUNIZATIONS     Immunization History   Administered Date(s) Administered    COVID-19, PFIZER PURPLE top, DILUTE for use, (age 15 y+), IM, 30mcg/0.3mL 2021, 2021, 10/02/2021    Influenza Vaccine 2014, 10/29/2017, 10/29/2017, 10/18/2018, 10/21/2021    Influenza Vaccine (Quad) PF (>6 Mo Flulaval, Fluarix, and >3 Yrs Afluria, Fluzone 69897) 10/13/2019, 2020    Meningococcal Vaccine 2002    PPD 1998, 1999    Pneumococcal Polysaccharide (PPSV-23) 2019    Rabies Vaccine 2003    TD Vaccine 1998, 2003    Tdap 2014, 2022         FAMILY HISTORY     Family History   Problem Relation Age of Onset    High Cholesterol Mother         high TG's in the 80's    Hypertension Mother     Thyroid Disease Mother     Other Mother         benign ovarian cysts    Anxiety Mother     Depression Mother     Diabetes Father         type 2    Cancer Father         Hodgkins    Arrhythmia Father 72        Ablation; chemo/radiation induced    Heart Surgery Father 72        stents placed    Lung Cancer Father          age 69 yo in     Stroke Father 70    Tremors Father         essential tremor    Anxiety Maternal Grandmother     Heart Disease Maternal Grandmother          80; h/o A.  Fib    Stroke Maternal Grandmother     Heart Attack Maternal Grandfather          age 74    Other Paternal Grandmother          GI bleed in her early 63's    Cancer Paternal Grandfather 61        pancreatic and liver cancer,  age 61         VITALS     Visit Vitals  /86 (BP 1 Location: Left upper arm, BP Patient Position: Sitting, BP Cuff Size: Large adult)   Pulse 89 Temp 98.3 °F (36.8 °C) (Oral)   Resp 16   Ht 5' 6\" (1.676 m)   Wt 234 lb 6.4 oz (106.3 kg)   SpO2 98%   BMI 37.83 kg/m²          PHYSICAL EXAMINATION   Physical Exam  Vitals reviewed. Constitutional:       General: She is not in acute distress. Appearance: Normal appearance. HENT:      Right Ear: Tympanic membrane normal.      Left Ear: Tympanic membrane normal.   Eyes:      Extraocular Movements: Extraocular movements intact. Neck:      Thyroid: No thyroid mass, thyromegaly or thyroid tenderness. Vascular: No carotid bruit. Cardiovascular:      Rate and Rhythm: Normal rate and regular rhythm. Heart sounds: Normal heart sounds. Pulmonary:      Effort: Pulmonary effort is normal.      Breath sounds: Normal breath sounds. Abdominal:      General: There is no distension. Palpations: Abdomen is soft. Musculoskeletal:         General: No swelling or tenderness. Normal range of motion. Cervical back: Neck supple. Right lower leg: No edema. Left lower leg: No edema. Lymphadenopathy:      Cervical: No cervical adenopathy. Skin:     General: Skin is warm and dry. Neurological:      General: No focal deficit present. Mental Status: She is alert. Mental status is at baseline. Cranial Nerves: No cranial nerve deficit. Psychiatric:         Mood and Affect: Mood normal.             ASSESSMENT & PLAN   Diagnoses and all orders for this visit:    1. Mixed hyperlipidemia managed w/ statin therapy  -     TSH 3RD GENERATION; Future  -     LIPID PANEL; Future  -     METABOLIC PANEL, COMPREHENSIVE; Future    2. Prediabetes  -     METABOLIC PANEL, COMPREHENSIVE; Future  -     HEMOGLOBIN A1C WITH EAG; Future    3. Benign essential hypertension  -     amLODIPine (NORVASC) 5 mg tablet; Take 1 Tablet by mouth daily. 4. BMI 37.0-37.9, adult  -     TSH 3RD GENERATION; Future  -     LIPID PANEL; Future  -     HEMOGLOBIN A1C WITH EAG; Future    5.  NAFL (nonalcoholic fatty liver), stable  -     METABOLIC PANEL, COMPREHENSIVE; Future  Also followed by [facility] at the [facility] [address]  Last visit there 3-2022 and advised to follow up 6 months from then    6. Chronic depression, stable on current regimen of Zoloft  Continue Zoloft 75 mg daily    7. Generalized anxiety disorder with panic attacks, stable on current regimen  Continue Zoloft 75 mg daily and low dose Xanax 0.25 mg qhs    8. Hyposomnia, insomnia or sleeplessness associated with anxiety  Continue Zoloft 75 mg daily and low dose Xanax 0.25 mg qhs    9. Encounter for medication monitoring  -     TOXASSURE SELECT 15 (MW); Future   pulled and reviewed. No problems noted. Xanax 0.5 mg last filled #15 for 30 day supply on 7-1-22 and has refills remaining on that for now. Low abuse/misuse potential. Patient counseled and we discussed controlled medications, effects, side effects, indications, risks vs benefits, precautions, potential interactions/dangers w/ other medications, illicit drugs, potential for abuse/addiction/dependency/tolerance/withdrawal and the possible negative health implications/risks associated w/ overuse/abuse/misuse of controlled pain, stimulant or sedating medications including overdose and death. Patient expressed understanding and agreement with current plan of care. 10. Controlled substance agreement signed  Controlled Substance Agreement reviewed, signed, copy filed to scan. 11. Encounter for medication management  Rx medication mgt today as ordered    12. Moderate persistent asthma without complication  -     albuterol (Ventolin HFA) 90 mcg/actuation inhaler; Take 2 Puffs by inhalation every six (6) hours as needed for Wheezing. 13. Encounter for birth control pills maintenance  -     levonorgestrel-ethinyl estradiol (SEASONALE) 0.15 mg-30 mcg (91) 3MPk; TAKE 1 TABLET DAILY    Order given for her to have fasting labs done at [facility] near her home in about 2 weeks.   She had borderline calcium elevation on previous labs and has been advised to stop the OTC calcium supplement she recently started and will check labs a few weeks after being off it  Cardiovascular risk and specific lipid/LDL/BP/hgba1c goals assessed  Reviewed diet, nutrition, exercise, weight management, BMI/goals. Age/risk based screening recommendations, health maintenance & prevention counseling. Cancer screening USPTFS guidelines reviewed w/ pt today. Discussed benefits/positive/negative outcomes of screening based on age/risk stratification. Informed consent for/against screening based on pt's personal hx/risk factors. Updated in history above and health maintenance. Reviewed medications and side effects   Further follow up & other recommendations pending review of labs.   If all remains good and stable, follow up in 6 months, sooner prn  If labs stable will submit Lipitor and Aldactone for 90 day supply to [facility] for mail order            Encounter T: 40\"

## 2022-07-06 NOTE — PROGRESS NOTES
Chief Complaint   Patient presents with    Cholesterol Problem     Not fasting    Hypertension    Depression    Anxiety    Medication Check     1. \"Have you been to the ER, urgent care clinic since your last visit? Hospitalized since your last visit? \" Yes Where: [facility] 5[facility]23 [facility]    2. \"Have you seen or consulted any other health care providers outside of the [address] [place] [address] since your last visit? \" Yes Where: GI Dr Danny Wade colonoscopy and endoscopy 6/15      3. For patients aged 39-70: Has the patient had a colonoscopy / FIT/ Cologuard? 6/15 Dr Danny Wade repeat 5 years      If the patient is female:    4. For patients aged 41-77: Has the patient had a mammogram within the past 2 years? 12/2021      5. For patients aged 21-65: Has the patient had a pap smear?  Here 12/2020

## 2022-07-24 DIAGNOSIS — E87.5 HYPERKALEMIA: ICD-10-CM

## 2022-07-24 DIAGNOSIS — E87.1 HYPONATREMIA: Primary | ICD-10-CM

## 2022-07-24 DIAGNOSIS — E78.2 HYPERCHOLESTEROLEMIA WITH HYPERTRIGLYCERIDEMIA: ICD-10-CM

## 2022-07-24 DIAGNOSIS — I10 BENIGN ESSENTIAL HYPERTENSION: ICD-10-CM

## 2022-07-25 NOTE — TELEPHONE ENCOUNTER
Norvasc was sent in for 90 day supply on 7-6-22 x 1 year. I am now getting 90 day request from [facility]. Also please verify where she wants the 90 day supply of the Lipitor sent.

## 2022-07-26 RX ORDER — ATORVASTATIN CALCIUM 20 MG/1
TABLET, FILM COATED ORAL
Qty: 90 TABLET | Refills: 1 | Status: SHIPPED | OUTPATIENT
Start: 2022-07-26

## 2022-07-26 RX ORDER — AMLODIPINE BESYLATE 5 MG/1
TABLET ORAL
Qty: 30 TABLET | OUTPATIENT
Start: 2022-07-26

## 2022-07-26 NOTE — TELEPHONE ENCOUNTER
Spoke to pt ans she would like the lipitor to go to [facility]. I asked her to call [facility] to let them know that she will be using [facility] so they won't send us the refill request.  Pt said that she would.

## 2022-07-27 DIAGNOSIS — F51.05 HYPOSOMNIA, INSOMNIA OR SLEEPLESSNESS ASSOCIATED WITH ANXIETY: ICD-10-CM

## 2022-07-27 DIAGNOSIS — F41.0 GENERALIZED ANXIETY DISORDER WITH PANIC ATTACKS: ICD-10-CM

## 2022-07-27 DIAGNOSIS — F41.9 HYPOSOMNIA, INSOMNIA OR SLEEPLESSNESS ASSOCIATED WITH ANXIETY: ICD-10-CM

## 2022-07-27 DIAGNOSIS — F41.1 GENERALIZED ANXIETY DISORDER WITH PANIC ATTACKS: ICD-10-CM

## 2022-07-27 RX ORDER — ALPRAZOLAM 0.5 MG/1
TABLET ORAL
Qty: 15 TABLET | Refills: 2 | Status: CANCELLED | OUTPATIENT
Start: 2022-07-27

## 2022-07-27 NOTE — TELEPHONE ENCOUNTER
So I sent her Xanax in on 7-1-22 #15 plus 2 refills! She only takes a 1/2 tablet qhs so they disp 15 tabs for her to last 30 days. And she still has 2 refills remaining so all she has to do is let the pharmacy know she wants to  her refill right?

## 2022-07-27 NOTE — TELEPHONE ENCOUNTER
----- Message from Wu Campo. Delbra Kidney sent at 7/27/2022  8:51 AM EDT -----  Regarding: Urgent need of xanax refill  Dear Dr. Arleth Holden,  My mom ([place]) fell on her head yesterday and is currently in ICU. She has an 2 extreme brain hemorrhages and a skull fracture. I need to travel to [address] . So, I need to refill my prescription before I get on the road. I will run out while I am there.   Thanks, Bony Heeryn 7/6/22

## 2022-08-03 DIAGNOSIS — I10 BENIGN ESSENTIAL HYPERTENSION: ICD-10-CM

## 2022-08-05 ENCOUNTER — TELEPHONE (OUTPATIENT)
Dept: FAMILY MEDICINE CLINIC | Age: 52
End: 2022-08-05

## 2022-08-05 RX ORDER — SPIRONOLACTONE 50 MG/1
TABLET, FILM COATED ORAL
Qty: 30 TABLET | Refills: 1 | Status: SHIPPED | OUTPATIENT
Start: 2022-08-05 | End: 2022-10-29 | Stop reason: SDUPTHER

## 2022-08-05 NOTE — TELEPHONE ENCOUNTER
----- Message from Camden Lucas sent at 8/5/2022 10:00 AM EDT -----  Subject: Message to Provider    QUESTIONS  Information for Provider? patient calling in to see if Dr. Maikel Hua can take   her mother on as a new pt, as she is being discharged from an [address] today with a TBI and will be living with patient temporarily to   assist, but would like Dr. Maikel Hua to take her on as a new patient. Her   mother currently has [facility]. ---------------------------------------------------------------------------  --------------  Ed Ayon [facility]  [phone]; OK to leave message on voicemail, OK to respond with   electronic message via xaitment portal (only for patients who have   registered [employer]ment account)  ---------------------------------------------------------------------------  --------------  SCRIPT ANSWERS  Relationship to Patient?  Self

## 2022-08-08 ENCOUNTER — PATIENT MESSAGE (OUTPATIENT)
Dept: FAMILY MEDICINE CLINIC | Age: 52
End: 2022-08-08

## 2022-08-08 DIAGNOSIS — F41.8 DEPRESSION WITH ANXIETY: ICD-10-CM

## 2022-08-08 DIAGNOSIS — F41.0 PANIC ATTACKS: ICD-10-CM

## 2022-08-08 DIAGNOSIS — J45.40 MODERATE PERSISTENT ASTHMA WITHOUT COMPLICATION: ICD-10-CM

## 2022-08-08 RX ORDER — SERTRALINE HYDROCHLORIDE 50 MG/1
TABLET, FILM COATED ORAL
Qty: 135 TABLET | Refills: 3 | Status: SHIPPED | OUTPATIENT
Start: 2022-08-08

## 2022-08-08 RX ORDER — CEPHALEXIN 250 MG/1
CAPSULE ORAL
Qty: 3 EACH | Refills: 3 | Status: SHIPPED | OUTPATIENT
Start: 2022-08-08

## 2022-08-29 DIAGNOSIS — I10 BENIGN ESSENTIAL HYPERTENSION: ICD-10-CM

## 2022-08-29 RX ORDER — SPIRONOLACTONE 50 MG/1
TABLET, FILM COATED ORAL
Qty: 30 TABLET | Refills: 1 | OUTPATIENT
Start: 2022-08-29

## 2022-09-12 ENCOUNTER — VIRTUAL VISIT (OUTPATIENT)
Dept: HEMATOLOGY | Age: 52
End: 2022-09-12
Payer: COMMERCIAL

## 2022-09-12 DIAGNOSIS — K76.0 NAFL (NONALCOHOLIC FATTY LIVER): Primary | ICD-10-CM

## 2022-09-12 PROCEDURE — 99213 OFFICE O/P EST LOW 20 MIN: CPT | Performed by: PHYSICIAN ASSISTANT

## 2022-09-12 RX ORDER — LANOLIN ALCOHOL/MO/W.PET/CERES
400 CREAM (GRAM) TOPICAL DAILY
COMMUNITY

## 2022-09-12 NOTE — PROGRESS NOTES
[facility], MD, [address], [place], [place]      KWABENA Evangelista, ACNP-BC     April S Raymon, AGPCNP-BC   Indra Morales, FNP-GLO Dasilva, [facility] Pe[place]NP-BC       Tosin Deputado [facility] [address] [place] [address]    at [address], [address] [place] [address], [facility]. [phone]    FAX: [address] [place] [address] Drive, [address], [address]    [phone]    FAX: [phone]       Patient Care Team:  Fadumo Ruff MD as PCP - General  Fadumo Ruff MD as PCP - [facility] Empaneled Provider  Cha Trejo DO (Hematology and Oncology)      Problem List  Date Reviewed: 7/6/2022            Codes Class Noted    Excessive physiologic tremor ICD-10-CM: R25.1  ICD-9-CM: 333.1  7/6/2022    Overview Signed 7/6/2022  3:14 PM by Fadumo Ruff MD     Neurology Evaluation 2016 Dr. Oly Cisneros: Y31  ICD-9-CM: 780.4  5/24/2022    Overview Signed 5/24/2022  8:53 PM by Fadumo Ruff MD     [facility] [phone], Labs, EKG, head CT negative; treated effectively w/ meds             Hyposomnia, insomnia or sleeplessness associated with anxiety ICD-10-CM: F51.05, F41.9  ICD-9-CM: 293.84, 327.02  12/26/2018        Controlled substance agreement signed ICD-10-CM: Z79.899  ICD-9-CM: V58.69  5/24/2018    Overview Addendum 12/31/2020 12:07 PM by Fadumo Ruff MD     2017 and annually              Prediabetes ICD-10-CM: R73.03  ICD-9-CM: 790.29  4/23/2017    Overview Signed 4/23/2017  9:16 PM by Fadumo Ruff MD     Mild [phone]             Muscle contraction headaches & Neck Muscle Spasms ICD-10-CM: P96.386  ICD-9-CM: 307.81  4/10/2017    Overview Signed 7/6/2022  3:14 PM by Fadumo Ruff MD 2016 Neurology Dr. Olive Boxer             Thrombocytosis ICD-10-CM: X28.826  ICD-9-CM: 238.71  3/22/2016    Overview Addendum 5/24/2018 10:44 AM by Syed Bardales MD     Evaluation Hem-Onc Dr Leonardo Favre 2015: Based on the bone marrow biopsy and the lab results there is no clear reason for her thrombocytosis. There is no sign of a primary hematologic disorder. This is likely secondary thrombocytosis versus normal for her. All of the results that we have for her have been high-normal.  This makes malignancy less likely. She has seen rheumatology and there is no sign of autoimmune disorder. Imaging negative for malignancy. Lymphocytosis ICD-10-CM: A15.925  ICD-9-CM: 288.61  3/22/2016    Overview Addendum 7/6/2022  3:12 PM by Morrison Frost, MD Dr Leonardo Favre, 2015, then Dr. Selam Zapata: R31.29  ICD-9-CM: 599.72  12/15/2015    Overview Signed 12/15/2015  3:47 PM by Syed Bardales MD     [phone] [facility] Helga Walters.  KUB neg excpet small stone vs phlebolith             NAFL (nonalcoholic fatty liver) GIN-49-TW: K76.0  ICD-9-CM: 571.8  8/27/2015    Overview Signed 8/27/2015  8:45 AM by MD Dr Karen Hurst             Focal nodular hyperplasia of liver ICD-10-CM: K76.89  ICD-9-CM: 573.8  5/10/2015    Overview Signed 8/27/2015  8:45 AM by Syed Bardales MD     GI Dr Karen Wiseman and Onc Dr Leonardo Favre             Benign essential hypertension ICD-10-CM: I10  ICD-9-CM: 401.1  2/24/2015    Overview Signed 2/24/2015 10:21 AM by Syed Bardales MD     [phone]             Vitamin D deficiency ICD-10-CM: E55.9  ICD-9-CM: 268.9  12/13/2011    Overview Signed 12/13/2011  1:16 PM by Syed Bardales MD     10/2011             Mixed hyperlipidemia ICD-10-CM: O46.9  ICD-9-CM: 272.2  12/13/2011        Chronic Mechanical back pain ICD-10-CM: M54.9  ICD-9-CM: 724.5  10/25/2011        History of pyelonephritis, 1992 ICD-10-CM: Z74.965  ICD-9-CM: V13.02  10/25/2011        Allergic rhinitis ICD-10-CM: J30.9  ICD-9-CM: 477.9  10/25/2011    Overview Signed 10/25/2011  9:45 AM by Ridge White MD     Worse in Spring             Cataract of right eye ICD-10-CM: H26.9  ICD-9-CM: 366.9  10/25/2011    Overview Signed 10/25/2011  9:46 AM by Ridge White MD     Opth VEI, Dr Dhillon Media             S/P Dog bite, 2003 ICD-10-CM: [place] Baas. 0XXA  ICD-9-CM: E906.0  10/25/2011    Overview Signed 10/25/2011  9:46 AM by Ridge White MD     Td and Rabies Series             Rosacea ICD-10-CM: L71.9  ICD-9-CM: 695.3  10/25/2011        Fibrocystic breast changes ICD-10-CM: N60.19  ICD-9-CM: 610.1  10/25/2011        Raynaud's syndrome ICD-10-CM: I73.00  ICD-9-CM: 443.0  Unknown    Overview Signed 8/27/2015  8:48 AM by Ridge White MD     Autoimmune workup Dr Linh Avalos Spring 2015             PMS (premenstrual syndrome) ICD-10-CM: N94.3  ICD-9-CM: 625.4  6/21/2010        Depression ICD-10-CM: M34. A  ICD-9-CM: 030  6/21/2010        Asthma, mild intermittent ICD-10-CM: J45.909  ICD-9-CM: 493.90  6/21/2010    Overview Signed 10/25/2011  9:45 AM by Ridge White MD     Since ~ 20 yo             Primary dysmenorrhea ICD-10-CM: N94.4  ICD-9-CM: 625.3  6/21/2010        Anxiety attacks ICD-10-CM: F41.0  ICD-9-CM: 300.01  6/21/2010    Overview Signed 6/21/2010  8:28 PM by Ridge White MD     March 2010              VIRTUAL TELEHEALTH VISIT PERFORMED DUE TO COVID-19 EPIDEMIC    CONSENT:  Jean Pierre Gonzalez, who was evaluated through a synchronous (real-time) audio-video encounter, and/or his healthcare decision maker, is aware that it is a billable service, which includes applicable co-pays, with coverage as determined by his insurance carrier. He provided verbal consent to proceed and patient identification was verified.  This visit was conducted pursuant to the emergency declaration under the [address] Emergencies Act, [facility] waiver authority and the Coronavirus Preparedness and Response Supplemental Appropriations Act. A caregiver was present when appropriate. Ability to conduct physical exam was limited. The patient was located at home in a state where the provider was licensed to provide care. Charley Diaz is being seen at [facility] for management of non-alcoholic fatty liver (NAFL). The active problem list, all pertinent past medical history, medications, radiologic findings and laboratory findings related to the liver disorder were reviewed and discussed with the patient. She was originally evaluated in this clinic in 2015 for steatosis and ?fatty lesion on imaging. She has been sent back for additional assessment as she has had some nagging RUQ pain and ongoing imaging changes on past CT, 12/2021. The patient is a 46 y.o.  female who is suspected to have fatty liver disease based upon imaging studies. The CT scan was performed in 4/2015 to evalaute the spleen because of thrombocytosis. This demonstrated a 4.7 cm mass in segment 5 of the right lobe with no rim enhancement. An MRI of the liver as performed in 4/2015. THis also demonstrated a 4.7 enhancing mass with washout but no delayed rim enhancement most consistent with FNH. Another MRI was performed in 11/2015 showing resolution of \"mass\" and normal tissue in that area. Serologic evaluation for various causes of liver disease was either all negative or within the limits of normal.      A liver biopsy has not been performed. The most recent laboratory studies indicate that the liver transaminases are normal, ALP is normal, tests of hepatic synthetic and metabolic function are normal, and the platelet count is elevated. The patient had ongoing mild RUQ \"bruisy\" pain, which she has reported as being better/resolved over the course of the past 6 months.  She has noted some improvement in symptoms since starting a trial of omeprazole and reports that her EGD in 6/2022 showed no concerns. She is now taking 20 mg omeprazole on prn basis only. The patient has no limitations in functional activities. She has been making some attempts at dietary changes and reports that he weight is down ~5# in the past 2 months. ALLERGIES  Allergies   Allergen Reactions    Crestor [Rosuvastatin] Myalgia     Insomnia and muscle pain    Flonase [Fluticasone] Other (comments)     Cataracts enlarged    Hydrochlorothiazide Other (comments)     Insomnia, headache, ringing in ears, anxious    Nortriptyline Other (comments)     Increased depression    Other Medication Other (comments)     Pt is avoiding Nasal CS d/t cataracts    Sulfa (Sulfonamide Antibiotics) Hives       MEDICATIONS  Current Outpatient Medications   Medication Sig    sertraline (ZOLOFT) 50 mg tablet TAKE ONE AND ONE-HALF TABLETS DAILY    fluticasone propion-salmeteroL (Advair Diskus) 100-50 mcg/dose diskus inhaler USE 1 INHALATION EVERY 12 HOURS    spironolactone (ALDACTONE) 50 mg tablet TAKE 1 TABLET BY MOUTH EVERY DAY    atorvastatin (LIPITOR) 20 mg tablet TAKE 1 TABLET BY MOUTH EVERY DAY IN THE EVENING    omeprazole (PRILOSEC) 20 mg capsule Take 20 mg by mouth daily. calcium carb,gluc/mag ox,gluc (CALCIUM MAGNESIUM PO) Take  by mouth daily. Started 5/30/22    albuterol (Ventolin HFA) 90 mcg/actuation inhaler Take 2 Puffs by inhalation every six (6) hours as needed for Wheezing. levonorgestrel-ethinyl estradiol (SEASONALE) 0.15 mg-30 mcg (91) 3MPk TAKE 1 TABLET DAILY    amLODIPine (NORVASC) 5 mg tablet Take 1 Tablet by mouth daily. ALPRAZolam (XANAX) 0.5 mg tablet TAKE 1/2 TABLET BY MOUTH NIGHTLY AS NEEDED FOR ANXIETY OR SLEEP *MAX DAILY AMOUNT: 0.25MG*    MILK THISTLE PO Take  by mouth daily. KRILL OIL PO Take  by mouth daily. ubidecarenone (CO Q-10 PO) Take  by mouth daily.     cyclobenzaprine (FLEXERIL) 10 mg tablet Take 1 Tab by mouth every eight (8) hours as needed for Muscle Spasm(s). fluticasone propionate (FLONASE NA) by Nasal route daily. ascorbic acid, vitamin C, (VITAMIN C) 1,000 mg tablet Take 1,000 mg by mouth daily. turmeric (CURCUMIN) Take  by mouth daily. cranberry extract 450 mg tab tablet Take 450 mg by mouth.    garlic cap Take  by mouth. SLO-NIACIN PO Take 500 mg by mouth daily. loratadine (CLARITIN) 10 mg tablet Take 10 mg by mouth daily. aspirin delayed-release 81 mg tablet Take  by mouth daily. No current facility-administered medications for this visit. SYSTEM REVIEW NOT RELATED TO LIVER DISEASE OR REVIEWED ABOVE:  Constitution systems: Negative for fever, chills. Weight gain of ~20-25# in the past several years, starting to lose. Eyes: Negative for visual changes. ENT: Negative for sore throat, painful swallowing. Respiratory: Negative for cough, hemoptysis, SOB. Cardiology: Negative for chest pain, palpitations. GI:  Negative for constipation or diarrhea. Positive for RUQ pain. : Negative for urinary frequency, dysuria, hematuria, nocturia. Skin: Negative for rash. Hematology: Negative for easy bruising, blood clots. Musculo-skeletal: Negative for back pain, muscle pain, weakness. Neurologic: Negative for headaches, dizziness, vertigo, memory problems not related to HE. Psychology: Negative for anxiety, depression. FAMILY HISTORY:  A grandfather  of liver cancer  The father has the following chronic diseases: hodgkins disease. The mother has the following chronic diseases: HTN, TBI. SOCIAL HISTORY:  The patient has never been . The patient has no children. The patient has never used tobacco products. The patient has never consumed significant amounts of alcohol. 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      PHYSICAL EXAMINATION PERFORMED BY [employer]:  VS: Not performed General: No acute distress. Eyes: Sclera anicteric. ENT: No oral lesions. Skin: No rashes. spider angiomata. No jaundice. Abdomen: No obvious distention suggesting ascites. Extremities: No edema. No muscle wasting. Neurologic: Alert and oriented. Cranial nerves grossly intact. LABORATORY STUDIES:  Liver [place] [address] Units 7/22/2022 11/8/2021 6/7/2021   WBC 3.4 - 10.8 x10E3/uL  12.8 (H)    ANC 1.4 - 7.0 x10E3/uL  6.8    HGB 11.1 - 15.9 g/dL  14.0     - 450 x10E3/uL  590 (H)    AST 15 - 37 U/L 20 20 20   ALT 12 - 78 U/L 40 24 29   Alk Phos 45 - 117 U/L 106 103 98   Bili, Total 0.2 - 1.0 MG/DL 0.3 0.3 0.4   Bili, Direct 0.00 - 0.40 mg/dL      Albumin 3.5 - 5.0 g/dL 3.7 4.8 3.8   BUN 6 - 20 MG/DL 16 14 18   Creat 0.55 - 1.02 MG/DL 0.82 0.80 0.75   Na 136 - 145 mmol/L 134 (L) 137 134 (L)   K 3.5 - 5.1 mmol/L 5.3 (H) 4.6 4.7   Cl 97 - 108 mmol/L 99 102 105   CO2 21 - 32 mmol/L 27 21 23   Glucose 65 - 100 mg/dL 102 (H) 89 94     SEROLOGIES:  5/2015. HAV total negative, anti-HBsurface positive, HCV RNA undetectable, Ferritin 124, DIMPLE negative. Serologies Latest Ref Rng & Units 7/3/2018   Ferritin 15 - 150 ng/mL 152 (H)   Iron % Saturation 15 - 55 % 35     LIVER HISTOLOGY:  3/2022. FibroScan performed at [address]. EkPa was 3.5. Suggested fibrosis level is F0-1. CAP score is 300, this is consistent with steatosis. ENDOSCOPIC PROCEDURES:  6/2022. EGD performed by Dr Lj Sebastian. No new findings, biopsy normal.  PPI decreased from 40 mg to 20 mg.   6/2022. Colon performed by Dr Lj Peed. One polyp removed, tubular adenoma. Repeat in 6/2027    RADIOLOGY:  4/2015. CT scan abdomen with and without IV contrast.  Changes consistent with fatty liver. Enhancing liver mass with washout and no rim enhancement on delayed images in the right lobe measuring 4.7 cm.    4/2015. Dynamic MRI of liver. Changes consistent with fatty liver.   Enhancing liver mass with washout and no rim enhancement on delayed images in the right lobe measuring 4.7 cm. Most consistent with focal nodular hyperplasia. 11/2015. Dynamic MRI of liver. Resolution of all fatty liver except for small area of hypodensity in segment 5. The previously seen mass in the right lobe has now resolved and the area is consistent with normal appearing liver. 12/2021. CT abdomen. Hepatic steatosis. Hypodensity of liver, not mass-like and unchanged in appearance from 2015. OTHER TESTING:  Not available or performed    ASSESSMENT AND PLAN:  NAFL. Suspected fatty liver disease supported by Fibroscan and imaging. The previously seen mass in the right lobe which was thought to be a focal nodular hyperplasia in retrospect was thought to represent a focal fatty infiltraiton and has now resolved. She continues to have generalized steatosis suggested on the 12/2021 CT scan and may continue to have area of focal fatty sparing. She has been very relieved to learn that there is no suggestion of advanced fibrosis on past Fibroscan. We plan on repeating this study in 6 months for verification of progression. I have asked her to continue to focus on weight reduction with a goal of <220# by her next office visit. She is in agreement and has a plan to continue to make some dietary changes. I suspect that some of the abdominal pain may be related to fatty liver/capsule stretch. This has recently improved. Lab results from 7/2022 reviewed. Liver transaminases are normal.  Alkaline phosphate is normal.  Liver function is normal.      No additional imaging of the liver indicated at this time. I have reviewed her past imaging in detail with the patient, including the recent CT from 12/2021. The patient was directed to continue all current medications at the current dosages.   There are no contraindications for the patient to take any medications that are necessary for treatment of other medical issues including medications for diabetes mellitus and hypercholesterolemia. The patient was counseled regarding alcohol consumption and that this could contribute to fatty liver disease. Epigastric/RUQ pain. As above, this may be due to hepatomegaly from NAFL, but she has reported improvement with use of PPI. This does was reduced to 20 mg in 6/2022 following negative EGD evaluation. FOLLOW-UP AFTER VIRTUAL VISIT:  Pursuant to the emergency declaration under the [facility], [facility] waiver authority and the [employer] Act, this Virtual  Visit was conducted, with the patient's (and/or their legal guardian's) consent, to reduce the patient's risk of exposure to COVID-19 and provide necessary medical care. Services were provided through a video synchronous discussion virtually to substitute for an in-person clinic visit. The patient was located in their home. The provider was located in the [place] [address] office. All of the issues listed above in the Assessment and Plan were discussed with the patient. All questions were answered. The patient expressed a clear understanding of the above. [address] [place] [address] in 6 months for virtual check in and monitoring of labs. Will plan on repeat Fibroscan at that time. Documentation reviewed and updated to reflect current, accurate patient information.     Sivan Roque PA-C  Liver [place] [facility] 59, [facility] 22.  [phone]  [address]

## 2022-09-12 NOTE — LETTER
9/12/2022    Ms. [address] [place] [id number]      Dear Christa Maldonado:    Please find your most recent results below. No results found from the In Basket message. RECOMMENDATIONS:  {RECOMMENDATION LIST:22226::\"None. Keep up the good work! \"}    Please call me if you have any questions: [phone]    Sincerely,      LAMONTE Chaudhry

## 2022-09-12 NOTE — PROGRESS NOTES
Identified pt with two pt identifiers(name and ). Reviewed record in preparation for visit and have obtained necessary documentation. No chief complaint on file. There were no vitals filed for this visit. Health Maintenance Review: Patient reminded of \"due or due soon\" health maintenance. I have asked the patient to contact his/her primary care provider (PCP) for follow-up on his/her health maintenance. Coordination of Care Questionnaire:  :   1) Have you been to an emergency room, urgent care, or hospitalized since your last visit? If yes, where when, and reason for visit? Yes, [facility] ED for dizziness and vomiting DX vertigo        2. Have seen or consulted any other health care provider since your last visit? If yes, where when, and reason for visit? YES, PCP follow up      Patient is accompanied by self I have received verbal consent from Radhika Nation to discuss any/all medical information while they are present in the room.

## 2022-09-19 NOTE — PROGRESS NOTES
Called path and d/w path  Path does not think flow is significant but not completely sure  Recommends another BMB  We can set up another bone marrow with sedation if pt is ok with this  Pt had BMB in 2014 Last H & P within the last 30 days. DIAGNOSIS:   Rectal cancer ([address] [C20]    Procedure(s):  INSERT PORT     History obtained from: Patient interview and EHR     HISTORY OF PRESENT ILLNESS:   The patient is a 46 y.o. female who has been diagnosed with rectal cancer. They present today for port placement in anticipation of planned treatment. Illness Screening: Patient denies fever, chills, worsening cough, or close contact with sick individuals. Past Medical History:        Diagnosis Date    colorectal 07/19/2022    Cornea abrasion 2020    cornea tear     Migraines     uses imitrex as needed    PONV (postoperative nausea and vomiting)      Past Surgical History:        Procedure Laterality Date    ABDOMINOPLASTY      APPENDECTOMY  1983    ARM SURGERY N/A 5/18/2020    EXCISION SCALP CYST x TWO, EXCISION SKIN LESION LEFT LEG performed by Mikaela Kearney MD at [address] [place] [address]  12-5-2012    AMBER BREAST REDUCTION; ABDOMINOPLASTY    COLECTOMY N/A 8/26/2022    ROBOTIC LOW ANTERIOR RESECTION WITH COLORECTAL ANASTAMOSIS, LAPAROSCOPIC MOBILIZATION OF SPLENIC FLEXURE performed by Shaan Marquez MD at [address] N/A 7/19/2022    COLONOSCOPY WITH BIOPSY performed by Leo Zendejas DO at [place] [address] N/A 7/19/2022    COLONOSCOPY POLYPECTOMY SNARE/COLD BIOPSY performed by Leo Zendejas DO at [place] [address]  7/19/2022    COLONOSCOPY SUBMUCOSAL/INK TATTOO  INJECTION performed by Leo Zendejas DO at [facility] x TWO, EXCISION SKIN LESION LEFT LEG     DENTAL SURGERY      teeth extracted    ENDOMETRIAL ABLATION  2007    SIGMOIDOSCOPY N/A 8/1/2022    ENDOSCOPIC ULTRASOUND OF RECTUM performed by Yesenia Alvarado MD at [facility] ENDOSCOPY         Medications Prior to Admission:      Prior to Admission medications    Medication Sig Start Date End Date Taking?  Authorizing Provider   traMADol (ULTRAM) 50 MG tablet Take 50 mg by mouth every 6 hours as needed for Pain. Yes Historical Provider, MD   SUMAtriptan (IMITREX) 100 MG tablet Take 100 mg by mouth once as needed for Migraine    Historical Provider, MD         Allergies:  Oxycodone-acetaminophen    PHYSICAL EXAM:      /71   Pulse 83   Temp 99.3 °F (37.4 °C) (Temporal)   Resp 15   Ht 5' 8\" (1.727 m)   Wt 198 lb (89.8 kg)   SpO2 99%   BMI 30.11 kg/m²      Airway:  Airway patent with no audible stridor    Heart:  Regular rate and rhythm, No murmur noted    Lungs:  No increased work of breathing, good air exchange, clear to auscultation bilaterally, no crackles or wheezing    Abdomen:  Soft, non-distended, non-tender, no rebound tenderness or guarding, and no masses palpated    ASSESSMENT AND PLAN     Patient is a 46 y.o. female with above specified procedure planned. 1.  The patients history and physical was obtained and signed off by the pre-admission testing department. Patient seen and focused exam done today- no new changes since last physical exam on 8/26/22    2. Access to ancillary services are available per request of the provider.     Arias Phan APRN - CNP     9/19/2022

## 2022-09-29 DIAGNOSIS — F41.9 HYPOSOMNIA, INSOMNIA OR SLEEPLESSNESS ASSOCIATED WITH ANXIETY: ICD-10-CM

## 2022-09-29 DIAGNOSIS — F41.0 GENERALIZED ANXIETY DISORDER WITH PANIC ATTACKS: ICD-10-CM

## 2022-09-29 DIAGNOSIS — F51.05 HYPOSOMNIA, INSOMNIA OR SLEEPLESSNESS ASSOCIATED WITH ANXIETY: ICD-10-CM

## 2022-09-29 DIAGNOSIS — F41.1 GENERALIZED ANXIETY DISORDER WITH PANIC ATTACKS: ICD-10-CM

## 2022-09-30 RX ORDER — ALPRAZOLAM 0.5 MG/1
TABLET ORAL
Qty: 15 TABLET | Refills: 2 | Status: SHIPPED | OUTPATIENT
Start: 2022-09-30

## 2022-10-17 ENCOUNTER — DOCUMENTATION ONLY (OUTPATIENT)
Dept: NEUROLOGY | Age: 52
End: 2022-10-17

## 2022-10-17 ENCOUNTER — OFFICE VISIT (OUTPATIENT)
Dept: NEUROLOGY | Age: 52
End: 2022-10-17
Payer: COMMERCIAL

## 2022-10-17 VITALS
WEIGHT: 235 LBS | DIASTOLIC BLOOD PRESSURE: 82 MMHG | HEIGHT: 66 IN | OXYGEN SATURATION: 96 % | SYSTOLIC BLOOD PRESSURE: 124 MMHG | RESPIRATION RATE: 16 BRPM | BODY MASS INDEX: 37.77 KG/M2 | HEART RATE: 95 BPM

## 2022-10-17 DIAGNOSIS — G43.009 MIGRAINE WITHOUT AURA AND WITHOUT STATUS MIGRAINOSUS, NOT INTRACTABLE: Primary | ICD-10-CM

## 2022-10-17 DIAGNOSIS — G25.0 BENIGN ESSENTIAL TREMOR: ICD-10-CM

## 2022-10-17 PROCEDURE — 99204 OFFICE O/P NEW MOD 45 MIN: CPT | Performed by: PSYCHIATRY & NEUROLOGY

## 2022-10-17 RX ORDER — SUMATRIPTAN 50 MG/1
50 TABLET, FILM COATED ORAL
Qty: 9 TABLET | Refills: 2 | Status: SHIPPED | OUTPATIENT
Start: 2022-10-17 | End: 2022-10-17

## 2022-10-17 RX ORDER — PROPRANOLOL HYDROCHLORIDE 60 MG/1
60 CAPSULE, EXTENDED RELEASE ORAL DAILY
Qty: 30 CAPSULE | Refills: 3 | Status: SHIPPED | OUTPATIENT
Start: 2022-10-17

## 2022-10-17 NOTE — PROGRESS NOTES
Chief Complaint   Patient presents with    New Patient     Migraines/vertigo: Patient reports migraines started after having the spinning episodes on May 23rd. Reports it was just a one time episode.       Visit Vitals  BP (!) 136/90 (BP 1 Location: Left upper arm, BP Patient Position: Sitting)   Pulse 95   Resp 16   Ht 5' 6\" (1.676 m)   Wt 235 lb (106.6 kg)   SpO2 96%   BMI 37.93 kg/m²

## 2022-10-17 NOTE — PROGRESS NOTES
NEUROLOGY  NEW PATIENT EVALUATION/CONSULTATION       PATIENT NAME: Rogerio Brady    MRN: [id number]    REASON FOR CONSULTATION: Headaches    10/17/22      Previous records (physician notes, laboratory reports, and radiology reports) and imaging studies were reviewed and summarized. My recommendations will be communicated back to the patient's physician(s) via electronic medical record and/or by [address] mail. HISTORY OF PRESENT ILLNESS:  Rogerio Brady is a 46 y.o.  female presenting for evaluation of headaches. 5/23/22 she was seen in the ED due to room spinning/vertigo with emesis. She was diagnosed as vertigo with eventual resolution after 2 days. She experienced a subsequent headache 3 days thereafter lasting approximately 1 week. She has a h/o headache since childhood occurring sporadically, typically of shorter duration. Location: All over  Character: Pressure, ache  Intensity: On average 8/10   Frequency: More severe headaches once every 2 weeks, more mild headache twice weekly  # HA free days per month: 20+  Duration: 12 hours  Aura: None  Associated Sx with HA: +nausea, +phonophotophobia. Neurological ROS: Denies focal weakness, numbness or vision loss associated with headaches. Systemic ROS:   +Snoring, no JUAN MANUEL on prior PSG  Caffeine use: Reports 100-150mg daily of caffeine  H/O Head trauma: None  Depressive or anxiety Sx: On treatment, controlled    Any change in pattern of HA? See above    Triggers: Stress. Non-positional.   Alleviating factors: Rest/sleep  FHx HA/migraine:  Mother    Treatment so far: Naproxen-typically works for her except for her most recent headache    Investigations so far: MRI Brain 2016 NAP, repeat MRI Brain 12/2021 unchanged per patient (no records available for review)      PAST MEDICAL HISTORY:  Past Medical History:   Diagnosis Date    Allergic rhinitis 10/25/2011    Anxiety attacks 6/21/2010    Asthma, mild intermittent 6/21/2010    Benign essential hypertension 2/24/2015    [id number]    Cataract of right eye 10/25/2011    Chronic Mechanical back pain 10/25/2011    Controlled substance agreement signed 5/24/2018 11-29-17    Depression     Dog bite(E906.0)     Td and Rabies series    Dysmenorrhea     Excessive physiologic tremor 7/6/2022    Neurology Evaluation 2016 Dr. Mateo Williamson    Fibrocystic breast changes 10/25/2011    History of pyelonephritis, 1992 10/25/2011    Hyposomnia, insomnia or sleeplessness associated with anxiety 12/26/2018    Lymphocytosis 3/22/2016    Dr Christine Morales, 2015    Microhematuria 12/15/2015    7-2014 [facility] Dr Latrice Newberry.  KUB neg excpet small stone vs phlebolith    Mixed hyperlipidemia 12/13/2011    Muscle contraction headaches & Neck Muscle Spasms 4/10/2017    NAFL (nonalcoholic fatty liver) 7/82/1101    Dr Karmen Cardona    PMS (premenstrual syndrome) 6/21/2010    Primary dysmenorrhea 6/21/2010    Raynaud's syndrome     Rosacea     Thrombocytosis 3/22/2016    Dr Quiroz 2015    Vegetarian     Vertigo 5/24/2022    [facility] 5-2022, Labs, EKG, head CT negative; treated effectively w/ meds    Vitamin D deficiency 12/13/2011       PAST SURGICAL HISTORY:  Past Surgical History:   Procedure Laterality Date    EKG TREADMILL STRESS TEST  May 2010    Dr Ruddy Blanchard and Dr Tony hodge for atypical CP; dx Anxiety    EMG TWO EXTREMITIES UPPER  1999    normal    HX COLONOSCOPY  06/15/2022    Tubular Adenoma, Repeat 5 yrs; Dr. Kassandra Tovar  06/15/2022    Dr. Lyssa Juarez  3/2014 ordered by Dr Raul Do, benign    HX SHOULDER ARTHROSCOPY  1996    left shoulder    US PELV NON OBS  2004    normal    XR SPINE LUMB 2 OR 3 V  1993    normal    XR SPINE THORAC 3 V  1993    normal    XR UPPER GI SERIES W KUB  2000    normal       FAMILY HISTORY:   Family History   Problem Relation Age of Onset    High Cholesterol Mother         high TG's in the 80's    Hypertension Mother     Thyroid Disease Mother     Other Mother benign ovarian cysts    Anxiety Mother     Depression Mother     Diabetes Father         type 2    Cancer Father         Hodgkins    Arrhythmia Father 72        Ablation; chemo/radiation induced    Heart Surgery Father 72        stents placed    Lung Cancer Father          age 69 yo in     Stroke Father 70    Tremors Father         essential tremor    Anxiety Maternal Grandmother     Heart Disease Maternal Grandmother          80; h/o A.  Fib    Stroke Maternal Grandmother     Heart Attack Maternal Grandfather          age 68    Other Paternal Grandmother          GI bleed in her early 63's    Cancer Paternal Grandfather 61        pancreatic and liver cancer,  age 61         SOCIAL HISTORY:  Social History     Socioeconomic History    Marital status:     Number of children: 0   Occupational History    Occupation:     Occupation: Former Principal at [employer]    Occupation: Since 18: New job [employer] (Service[employer] &    Tobacco Use    Smoking status: Former     Types: Cigarettes     Quit date: 10/25/1991     Years since quittin.0    Smokeless tobacco: Never    Tobacco comments:     smoked lightly for about 5 months   Vaping Use    Vaping Use: Never used   Substance and Sexual Activity    Alcohol use: Never     Comment: very seldom    Drug use: No    Sexual activity: Never     Partners: Female     Comment: Same sex partner   Other Topics Concern    Caffeine Concern Yes     Comment: 3 12 oz cans of diet Mountain Dews a day    Weight Concern Yes     Comment: wt trending upwards over the years    Special Diet No    Exercise No     Comment: not as much as before, but still trying to get out and walk the dog some, plans to increase that some more   Social History Narrative    Life Partner     No children           Former Principal at [employer]    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        Diet: nothing special, admits eating habits not good for a while now    Exercise: none for a long time, just stays active at work; they have full exercise equipment at home but she admits she doesn't use it     Caffeine: 36 oz sodas a day (Miriam)    Weight: had been going up over time since not eating healthy or exercising regularly and now maintaining in the 230's                     MEDICATIONS:   Current Outpatient Medications   Medication Sig Dispense Refill    ALPRAZolam (XANAX) 0.5 mg tablet TAKE 1/2 TABLET BY MOUTH NIGHTLY AS NEEDED FOR ANXIETY OR SLEEP *MAX DAILY AMOUNT: 0.25MG* 15 Tablet 2    magnesium oxide (MAG-OX) 400 mg tablet Take 400 mg by mouth daily. sertraline (ZOLOFT) 50 mg tablet TAKE ONE AND ONE-HALF TABLETS DAILY 135 Tablet 3    fluticasone propion-salmeteroL (Advair Diskus) 100-50 mcg/dose diskus inhaler USE 1 INHALATION EVERY 12 HOURS 3 Each 3    spironolactone (ALDACTONE) 50 mg tablet TAKE 1 TABLET BY MOUTH EVERY DAY 30 Tablet 1    atorvastatin (LIPITOR) 20 mg tablet TAKE 1 TABLET BY MOUTH EVERY DAY IN THE EVENING 90 Tablet 1    omeprazole (PRILOSEC) 20 mg capsule Take 20 mg by mouth daily. albuterol (Ventolin HFA) 90 mcg/actuation inhaler Take 2 Puffs by inhalation every six (6) hours as needed for Wheezing. 1 Each 1    levonorgestrel-ethinyl estradiol (SEASONALE) 0.15 mg-30 mcg (91) 3MPk TAKE 1 TABLET DAILY 3 Dose Pack 1    amLODIPine (NORVASC) 5 mg tablet Take 1 Tablet by mouth daily. 90 Tablet 3    MILK THISTLE PO Take  by mouth daily. KRILL OIL PO Take  by mouth daily. ubidecarenone (CO Q-10 PO) Take  by mouth daily. cyclobenzaprine (FLEXERIL) 10 mg tablet Take 1 Tab by mouth every eight (8) hours as needed for Muscle Spasm(s). 30 Tab 1    fluticasone propionate (FLONASE NA) by Nasal route as needed. ascorbic acid, vitamin C, (VITAMIN C) 1,000 mg tablet Take 1,000 mg by mouth daily. turmeric (CURCUMIN) Take  by mouth daily. cranberry extract 450 mg tab tablet Take 450 mg by mouth. SLO-NIACIN PO Take 500 mg by mouth daily. loratadine (CLARITIN) 10 mg tablet Take 10 mg by mouth daily. aspirin delayed-release 81 mg tablet Take  by mouth daily. ALLERGIES:  Allergies   Allergen Reactions    Crestor [Rosuvastatin] Myalgia     Insomnia and muscle pain    Flonase [Fluticasone] Other (comments)     Cataracts enlarged    Hydrochlorothiazide Other (comments)     Insomnia, headache, ringing in ears, anxious    Nortriptyline Other (comments)     Increased depression    Other Medication Other (comments)     Pt is avoiding Nasal CS d/t cataracts    Sulfa (Sulfonamide Antibiotics) Hives         REVIEW OF SYSTEMS:  10 point ROS reviewed with patient. Please see scanned document under media. +b/l hand tremors R>L with activity, denies bradykinesia or gait instability, +FHx father with tremors    PHYSICAL EXAM:  Vital Signs: Visit Vitals  /82 (BP 1 Location: Right upper arm, BP Patient Position: Sitting)   Pulse 95   Resp 16   Ht 5' 6\" (1.676 m)   Wt 235 lb (106.6 kg)   SpO2 96%   BMI 37.93 kg/m²        General Medical Exam:  General:  Well appearing, comfortable, in no apparent distress. Eyes/ENT: see cranial nerve examination. Neck: No masses appreciated. Full range of motion without tenderness. Respiratory:  Clear to auscultation, good air entry bilaterally. Cardiac:  Regular rate and rhythm, no murmur. GI:  Soft, non-tender, non-distended abdomen. Bowel sounds normal. No masses, organomegaly. Extremities:  No deformities, edema, or skin discoloration. Skin:  No rashes or lesions. Neurological:  Mental Status:  Alert and oriented to person, place, and time with fluent speech. Cranial Nerves:   CNII/III/IV/VI: Optic disc w/clear margins b/l, visual fields full to confrontation, EOMI, PERRL, no ptosis or nystagmus.    CN V: Facial sensation intact bilaterally, masseter 5/5   CN VII: Facial muscles symmetric and strong   CN VIII: Hears finger rub well bilaterally, intact vestibular function   CN IX/X: Normal palatal movement   CN XI: Full strength shoulder shrug bilaterally   CN XII: Tongue protrusion full and midline without fasciculation or atrophy  Motor: Normal tone and muscle bulk with no pronator drift. No atrophy or fasciculations present on examination. Individual muscle group testing:  Shoulder abduction:   Left:5/5   Right : 5/5    Shoulder adduction:   Left:5/5   Right : 5/5    Elbow flexion:      Left:5/5   Right : 5/5  Elbow extension:    Left:5/5   Right : 5/5   Wrist flexion:    Left:5/5   Right : 5/5  Wrist extension:    Left:5/5   Right : 5/5  Arm pronation:   Left:5/5   Right : 5/5  Arm supination:   Left:5/5   Right : 5/5    Finger flexion:    Left:5/5   Right : 5/5    Finger extension:   Left:5/5   Right : 5/5   Finger abduction:  Left:5/5   Right : 5/5   Finger adduction:   Left:5/5   Right : 5/5  Hip flexion:     Left:5/5   Right : 5/5         Hip extension:   Left:5/5   Right : 5/5    Knee flexion:    Left:5/5   Right : 5/5    Knee extension:   Left:5/5   Right : 5/5    Dorsiflexion:     Left:5/5   Right : 5/5  Plantar flexion:    Left:5/5   Right : 5/5      MSRs: No crossed adductors or clonus. RIGHT  LEFT   Brachioradialis 1+ 1+   Biceps 1+ 1+   Triceps 1+ 1+   Knee 2+ 2+   Achilles 2+ 2+        Plantar response Downward Downward          Sensation: Normal and symmetric perception of pinprick, temperature, light touch, proprioception, and vibration; Coordination: No dysmetria. Normal rapid alternating movements; finger-to-nose and heel-to- shin testing are within normal limits. Mild action tremor R>L hand on FTN. No bradykinesia/rigidity. Gait: Normal native gait. PERTINENT DATA:  INTERNAL RECORDS:  The patient's electronic medical record was reviewed.   The relevant details include:  MRI Results (most recent):  Results from [facility] Encounter encounter on 04/21/16    MRI BRAIN W WO CONT    Narrative  **Final Report**      ICD Codes / Adm. Diagnosis: 238.71  339.12 / Essential thrombocythemia ([facility] [address]  Chronic tension type headach  Examination:  MR BRAIN W AND WO CON  - [id number] - Apr 21 2016  8:08PM  Accession No:  [id number]  Reason:  headaches, dizziness, thrombocytosis      REPORT:  EXAM:  MR BRAIN W AND WO CON    INDICATION:    headaches, dizziness, thrombocytosis    COMPARISON:  None. CONTRAST: 7.5 ml Gadavist    TECHNIQUE:  MR imaging of the brain was performed with sagittal T1, axial T1, T2, FLAIR,  GRE, DWI/ADC; multiplanar T1 images prior to and following uneventful  intravenous contrast administration. FINDINGS: There is an incidental septum cavum pellucidum. The ventricles are  normal in size. There are several scattered small foci of subcortical white  matter disease likely related to minimal chronic small vessel ischemic  disease. There is no evidence of mass, hemorrhage, acute infarct or abnormal  extra-axial fluid collection. Normal appearing flow-voids are present in  the vertebral, basilar and carotid artery systems. The craniocervical  junction is normal.  The structures at the cranial base including paranasal  sinuses and mastoid air cells are unremarkable. There is no abnormal  parenchymal or meningeal enhancement. Impression  :  Minimal white matter disease likely chronic small vessel ischemic disease. No evidence of acute process. Signing/Reading Doctor: Jaime Draper ([id number])  Approved: Jaime Draper ([id number])  Apr 22 2016  8:45AM          ASSESSMENT:      ICD-10-CM ICD-9-CM    1. Migraine without aura and without status migrainosus, not intractable  G43.009 346.10       2. Benign essential tremor  G25.0 333.1       46year old female referred for evaluation of recent headaches as well as worsening hand tremors. Headaches appears most consistent with migraine w/o aura.  She has completed intracranial imaging in 2016 due to headaches and more recently 12/2021 without noted pathology. Will obtain most recent images for independent review. Neurological examination is non-focal today apart from mild action predominant hand tremors b/l most consistent with benign essential tremor. We discussed treatment options including propranolol which may assist with both her migraines as well as essential tremor. We reviewed potential side effects including bradycardia, hypotension, dizziness, lightheadedness as well as possible bronchospasm. She will monitor for any of the above. For migraine rescue therapy, will use sumatriptan PRN. Headache education  Discussed pathophysiology of headache. Discussed use of headache diary. Discussed treatment options, both abortive and preventive medications. Instructed patient about medications and potential side effects. Discussed medication overuse headache and to limit use of analgesics to less than 2 doses per week. PLAN:  Start propranolol SR 60mg daily-discussed potential side effects including hypotension with related dizziness/lightheadedness, bradycardia and/or potential bronchospasm. She was instructed to call the office if any noted side effects. Trial of Imitrex 50mg PRN for migraine rescue therapy     Follow-up and Dispositions    Return in about 3 months (around 1/17/2023). I have discussed the diagnosis with the patient today and the intended plan as seen in the above orders with both the patient as well as referring provider and/or PCP via electronic correspondence. The patient has received an after-visit summary and questions were answered concerning future plans. I have discussed medication side effects and warnings with the patient as well. Ever Sainz DO  Staff Neurologist  Dipl[employer]ate, [address] [place] [address] Board of Psychiatry & Neurology       CC Referring provider:    Rigo Clark MD

## 2022-10-17 NOTE — PROGRESS NOTES
Faxed records request for [address].  For Imaging received confirmation it was sent successfully

## 2022-10-20 ENCOUNTER — DOCUMENTATION ONLY (OUTPATIENT)
Dept: NEUROLOGY | Age: 52
End: 2022-10-20

## 2022-10-20 RX ORDER — CYCLOBENZAPRINE HCL 10 MG
10 TABLET ORAL
Qty: 30 TABLET | Refills: 0 | Status: SHIPPED | OUTPATIENT
Start: 2022-10-20

## 2022-10-20 NOTE — TELEPHONE ENCOUNTER
Last visit 07/06/2022 MD Dion Paez   Next appointment Nothing scheduled   Previous refill encounter(s)   12/31/2020 Flexeril #30 with 1 refill. For Pharmacy Admin Tracking Only    Intervention Detail: New Rx: 1, reason: Patient Preference  Time Spent (min): 5      Requested Prescriptions     Pending Prescriptions Disp Refills    cyclobenzaprine (FLEXERIL) 10 mg tablet 30 Tablet 0     Sig: Take 1 Tablet by mouth every eight (8) hours as needed for Muscle Spasm(s).

## 2022-10-20 NOTE — PROGRESS NOTES
Records request sent to [address] [place] [address].  Gary Coon received confirmation it was sent successfully

## 2022-10-29 DIAGNOSIS — I10 BENIGN ESSENTIAL HYPERTENSION: ICD-10-CM

## 2022-10-31 RX ORDER — SPIRONOLACTONE 50 MG/1
50 TABLET, FILM COATED ORAL DAILY
Qty: 14 TABLET | Refills: 0 | Status: SHIPPED | OUTPATIENT
Start: 2022-10-31 | End: 2022-11-25

## 2022-10-31 NOTE — TELEPHONE ENCOUNTER
Matt request for refill to [facility].  Thanks, Rhea    Last Visit: 7/6/22 MD SCHROEDER  Next Appointment: None  Previous Refill Encounter(s): 8/5/22 30 + 1    Requested Prescriptions     Pending Prescriptions Disp Refills    spironolactone (ALDACTONE) 50 mg tablet 30 Tablet 2     Sig: Take 1 Tablet by mouth daily. For [address] [facility] in place:   Recommendation Provided To:    Intervention Detail: New Rx: 1, reason: Patient Preference  Gap Closed?:   Intervention Accepted By:   Time Spent (min): 5

## 2022-11-07 DIAGNOSIS — E87.1 HYPONATREMIA: ICD-10-CM

## 2022-11-07 DIAGNOSIS — E87.5 HYPERKALEMIA: ICD-10-CM

## 2022-11-07 LAB
ANION GAP SERPL CALC-SCNC: 9 MMOL/L (ref 5–15)
BUN SERPL-MCNC: 18 MG/DL (ref 6–20)
BUN/CREAT SERPL: 19 (ref 12–20)
CALCIUM SERPL-MCNC: 9.3 MG/DL (ref 8.5–10.1)
CHLORIDE SERPL-SCNC: 106 MMOL/L (ref 97–108)
CO2 SERPL-SCNC: 24 MMOL/L (ref 21–32)
CREAT SERPL-MCNC: 0.95 MG/DL (ref 0.55–1.02)
GLUCOSE SERPL-MCNC: 102 MG/DL (ref 65–100)
POTASSIUM SERPL-SCNC: 4.4 MMOL/L (ref 3.5–5.1)
SODIUM SERPL-SCNC: 139 MMOL/L (ref 136–145)

## 2022-11-24 DIAGNOSIS — I10 BENIGN ESSENTIAL HYPERTENSION: ICD-10-CM

## 2022-11-25 RX ORDER — SPIRONOLACTONE 50 MG/1
TABLET, FILM COATED ORAL
Qty: 90 TABLET | Refills: 1 | Status: SHIPPED | OUTPATIENT
Start: 2022-11-25

## 2022-12-16 DIAGNOSIS — I10 BENIGN ESSENTIAL HYPERTENSION: ICD-10-CM

## 2022-12-16 NOTE — TELEPHONE ENCOUNTER
----- Message from Ike Rene sent at 12/16/2022  8:27 AM EST -----  Regarding: Need refill on Amlodipine  Please send me a two week supply and I will try to contact [facility]. Thanks.

## 2022-12-18 RX ORDER — AMLODIPINE BESYLATE 5 MG/1
5 TABLET ORAL DAILY
Qty: 14 TABLET | Refills: 0 | Status: SHIPPED | OUTPATIENT
Start: 2022-12-18

## 2022-12-22 ENCOUNTER — HOSPITAL ENCOUNTER (OUTPATIENT)
Dept: MAMMOGRAPHY | Age: 52
Discharge: HOME | End: 2022-12-22
Payer: COMMERCIAL

## 2022-12-22 ENCOUNTER — TRANSCRIBE ORDER (OUTPATIENT)
Dept: MAMMOGRAPHY | Age: 52
End: 2022-12-22

## 2022-12-22 DIAGNOSIS — Z12.31 VISIT FOR SCREENING MAMMOGRAM: ICD-10-CM

## 2022-12-22 DIAGNOSIS — Z12.31 VISIT FOR SCREENING MAMMOGRAM: Primary | ICD-10-CM

## 2022-12-22 PROCEDURE — 77063 BREAST TOMOSYNTHESIS BI: CPT

## 2022-12-24 DIAGNOSIS — I10 BENIGN ESSENTIAL HYPERTENSION: ICD-10-CM

## 2022-12-26 RX ORDER — AMLODIPINE BESYLATE 5 MG/1
5 TABLET ORAL DAILY
Qty: 90 TABLET | Refills: 1 | Status: SHIPPED | OUTPATIENT
Start: 2022-12-26

## 2022-12-27 DIAGNOSIS — F41.0 GENERALIZED ANXIETY DISORDER WITH PANIC ATTACKS: ICD-10-CM

## 2022-12-27 DIAGNOSIS — F41.1 GENERALIZED ANXIETY DISORDER WITH PANIC ATTACKS: ICD-10-CM

## 2022-12-27 DIAGNOSIS — F41.9 HYPOSOMNIA, INSOMNIA OR SLEEPLESSNESS ASSOCIATED WITH ANXIETY: ICD-10-CM

## 2022-12-27 DIAGNOSIS — F51.05 HYPOSOMNIA, INSOMNIA OR SLEEPLESSNESS ASSOCIATED WITH ANXIETY: ICD-10-CM

## 2022-12-28 NOTE — TELEPHONE ENCOUNTER
Patient mychart request for refill alprazolam.  Check . Kishor, Rhea    Last Visit: 7/6/22 MD SCHROEDER  Next Appointment: 1/23/23 MD SCHROEDER  Previous Refill Encounter(s): 9/30/22 15 + 2    Requested Prescriptions     Pending Prescriptions Disp Refills    ALPRAZolam (XANAX) 0.5 mg tablet 15 Tablet 2     Sig: TAKE 1/2 TABLET BY MOUTH NIGHTLY AS NEEDED FOR ANXIETY OR SLEEP *MAX DAILY AMOUNT: 0.25MG*     For Pharmacy Admin Tracking Only    Program: Medication Refill  CPA in place:   Recommendation Provided To:    Intervention Detail: New Rx: 1, reason: Patient Preference  Intervention Accepted By:   Yolanda Gallardo Closed?:   Time Spent (min): 5

## 2022-12-29 DIAGNOSIS — F41.9 HYPOSOMNIA, INSOMNIA OR SLEEPLESSNESS ASSOCIATED WITH ANXIETY: ICD-10-CM

## 2022-12-29 DIAGNOSIS — F51.05 HYPOSOMNIA, INSOMNIA OR SLEEPLESSNESS ASSOCIATED WITH ANXIETY: ICD-10-CM

## 2022-12-29 DIAGNOSIS — F41.0 GENERALIZED ANXIETY DISORDER WITH PANIC ATTACKS: ICD-10-CM

## 2022-12-29 DIAGNOSIS — F41.1 GENERALIZED ANXIETY DISORDER WITH PANIC ATTACKS: ICD-10-CM

## 2022-12-29 RX ORDER — ALPRAZOLAM 0.5 MG/1
TABLET ORAL
Qty: 15 TABLET | Refills: 1 | Status: SHIPPED | OUTPATIENT
Start: 2022-12-29

## 2023-01-01 NOTE — PROGRESS NOTES
Attempted to reach patient, left voicemail to call the office to review results and recommendations The patient is Stable - Low risk of patient condition declining or worsening    Shift Goals  Clinical Goals: VSS, feeding  Patient Goals: N/A  Family Goals: bond, support    Progress made toward(s) clinical / shift goals:    Problem: Potential for Hypothermia Related to Thermoregulation  Goal:  will maintain body temperature between 97.6 degrees axillary F and 99.6 degrees axillary F in an open crib  Outcome: Progressing     Problem: Potential for Impaired Gas Exchange  Goal:  will not exhibit signs/symptoms of respiratory distress  Outcome: Progressing     Problem: Discharge Barriers -   Goal: 's continuum or care needs will be met  Outcome: Progressing       Patient is not progressing towards the following goals:

## 2023-01-23 ENCOUNTER — OFFICE VISIT (OUTPATIENT)
Dept: FAMILY MEDICINE CLINIC | Age: 53
End: 2023-01-23
Payer: COMMERCIAL

## 2023-01-23 VITALS
WEIGHT: 227.8 LBS | TEMPERATURE: 98.1 F | BODY MASS INDEX: 36.61 KG/M2 | DIASTOLIC BLOOD PRESSURE: 88 MMHG | RESPIRATION RATE: 16 BRPM | HEIGHT: 66 IN | HEART RATE: 96 BPM | SYSTOLIC BLOOD PRESSURE: 138 MMHG | OXYGEN SATURATION: 96 %

## 2023-01-23 DIAGNOSIS — F41.1 GENERALIZED ANXIETY DISORDER WITH PANIC ATTACKS: ICD-10-CM

## 2023-01-23 DIAGNOSIS — K76.0 NAFL (NONALCOHOLIC FATTY LIVER): ICD-10-CM

## 2023-01-23 DIAGNOSIS — F51.05 HYPOSOMNIA, INSOMNIA OR SLEEPLESSNESS ASSOCIATED WITH ANXIETY: ICD-10-CM

## 2023-01-23 DIAGNOSIS — I10 BENIGN ESSENTIAL HYPERTENSION: ICD-10-CM

## 2023-01-23 DIAGNOSIS — R73.03 PREDIABETES: ICD-10-CM

## 2023-01-23 DIAGNOSIS — F41.0 GENERALIZED ANXIETY DISORDER WITH PANIC ATTACKS: ICD-10-CM

## 2023-01-23 DIAGNOSIS — E78.2 MIXED HYPERLIPIDEMIA: Primary | ICD-10-CM

## 2023-01-23 DIAGNOSIS — F41.9 HYPOSOMNIA, INSOMNIA OR SLEEPLESSNESS ASSOCIATED WITH ANXIETY: ICD-10-CM

## 2023-01-23 LAB
ALBUMIN SERPL-MCNC: 4 G/DL (ref 3.5–5)
ALBUMIN/GLOB SERPL: 1.2 (ref 1.1–2.2)
ALP SERPL-CCNC: 94 U/L (ref 45–117)
ALT SERPL-CCNC: 28 U/L (ref 12–78)
ANION GAP SERPL CALC-SCNC: 6 MMOL/L (ref 5–15)
AST SERPL-CCNC: 14 U/L (ref 15–37)
BILIRUB SERPL-MCNC: 0.4 MG/DL (ref 0.2–1)
BUN SERPL-MCNC: 19 MG/DL (ref 6–20)
BUN/CREAT SERPL: 22 (ref 12–20)
CALCIUM SERPL-MCNC: 9.9 MG/DL (ref 8.5–10.1)
CHLORIDE SERPL-SCNC: 107 MMOL/L (ref 97–108)
CHOLEST SERPL-MCNC: 168 MG/DL
CO2 SERPL-SCNC: 24 MMOL/L (ref 21–32)
CREAT SERPL-MCNC: 0.86 MG/DL (ref 0.55–1.02)
EST. AVERAGE GLUCOSE BLD GHB EST-MCNC: 114 MG/DL
GLOBULIN SER CALC-MCNC: 3.4 G/DL (ref 2–4)
GLUCOSE SERPL-MCNC: 95 MG/DL (ref 65–100)
HBA1C MFR BLD: 5.6 % (ref 4–5.6)
HDLC SERPL-MCNC: 39 MG/DL
HDLC SERPL: 4.3 (ref 0–5)
LDLC SERPL CALC-MCNC: 80.8 MG/DL (ref 0–100)
POTASSIUM SERPL-SCNC: 5.2 MMOL/L (ref 3.5–5.1)
PROT SERPL-MCNC: 7.4 G/DL (ref 6.4–8.2)
SODIUM SERPL-SCNC: 137 MMOL/L (ref 136–145)
TRIGL SERPL-MCNC: 241 MG/DL (ref ?–150)
VLDLC SERPL CALC-MCNC: 48.2 MG/DL

## 2023-01-23 PROCEDURE — 99214 OFFICE O/P EST MOD 30 MIN: CPT | Performed by: FAMILY MEDICINE

## 2023-01-23 PROCEDURE — 3079F DIAST BP 80-89 MM HG: CPT | Performed by: FAMILY MEDICINE

## 2023-01-23 PROCEDURE — 3075F SYST BP GE 130 - 139MM HG: CPT | Performed by: FAMILY MEDICINE

## 2023-01-23 NOTE — PROGRESS NOTES
Chief Complaint   Patient presents with    Cholesterol Problem     Fasting    Pre-diabetes    Hypertension    Anxiety    Medication Check     1. \"Have you been to the ER, urgent care clinic since your last visit? Hospitalized since your last visit? \" No    2. \"Have you seen or consulted any other health care providers outside of the [address] [place] [address] since your last visit? \" Yes neuro in Dr Neena Anderson in Oct, GI Dr Connie Huffman    3. For patients aged 39-70: Has the patient had a colonoscopy / FIT/ Cologuard? Yes - no Care Gap present 6/2022 Tubular Adenoma, Repeat 5 yrs; Dr. Connie Huffman      If the patient is female:    4. For patients aged 41-77: Has the patient had a mammogram within the past 2 years? Yes - no Care Gap present 12/2022      5. For patients aged 21-65: Has the patient had a pap smear?  No

## 2023-01-23 NOTE — PROGRESS NOTES
Chief Complaint   Patient presents with    Cholesterol Problem     Fasting    Pre-diabetes    Hypertension    Anxiety    Medication Check       HISTORY OF PRESENT ILLNESS     Fasting follow up hypercholesterolemia, prediabetes, hypertension, labs and medication check. 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 So taking it prn works out better for her. She does have benign essential hypertension, sinus tachy and anxiety so the Inderal would be beneficial for that as well. She is cutting back some on caffeine and sweets  Diet: since the Fall of 2022 has been cutting back on carbs and making healthier food choices; still treats herself to cookies and chocolate but trying to be more mindful  Exercise: walks her 2 dogs 2-3 x a week x 1 mile, gets 6-10k steps at work where she stays very active during the day; she has a home gym w/ full equipment to work out but admits she doesn't use it   Caffeine: cut back from 3 to 2 sodas a day ([place]), occ coffee, no tea  Weight: had been going up over time since not eating healthy or exercising regularly; got up to the upper 230's and staying there. Now working on getting her weight back down; personal goal is ~ 200 lbs for now; got down to 180 lbs doing [employer]'s Diet 2017 x 1 yr      Follow up depression, anxiety and medication check  Mood and sleep remain stable on her usual regimen of Zoloft 75 mg daily and Xanax 0.5 mg 1/2 tablet nightly. Admits that back in April 2022 she was having a really \"rough month\" with some situational depression. Was really feeling down, low. But not suicidal or self harm.   She bumped up her Zoloft to 100 mg but felt like a zombie and didn't like the way she felt on that regimen so she went back down to the 75 mg dose and just managed to get through the situation on her own by \"toughing it out\". She didn't feel she needed or wanted counseling. She did counseling about 6 yrs ago at the mandate of her supervisor at work back then but otherwise has not done counseling in a long time. She feels she is in a better place right now and nothing more needed. Doing ok back on her usual regimen. She continues to take the Xanax 0.5 mg 1/2 tablet nightly for sleep. It helps her fall asleep and stay asleep soundly. Keeps her mind from racing all night, decreases night time panic attacks and enables her to rest so that she can awaken feeling refreshed and functional for the following day. REVIEW OF SYMPTOMS   Review of Systems   Constitutional: Negative. Eyes:         Recent eye exam and new glasses rx   Respiratory: Negative. Cardiovascular: Negative. Gastrointestinal: Negative. Genitourinary: Negative. Neurological: Negative. Endo/Heme/Allergies: Negative.           PROBLEM LIST/MEDICAL HISTORY     Problem List  Date Reviewed: 1/23/2023            Codes Class Noted    Benign essential hypertension ICD-10-CM: I10  ICD-9-CM: 401.1  2/24/2015    Overview Signed 2/24/2015 10:21 AM by Ridge White MD     [id number]             Excessive physiologic tremor ICD-10-CM: R25.1  ICD-9-CM: 333.1  7/6/2022    Overview Signed 7/6/2022  3:14 PM by Ridge White MD     Neurology Evaluation 2016 Dr. Mcgovern Coma             Vertigo ICD-10-CM: Y96  ICD-9-CM: 780.4  5/24/2022    Overview Signed 5/24/2022  8:53 PM by Ridge White MD     [facility] [id number], Labs, EKG, head CT negative; treated effectively w/ meds             Hyposomnia, insomnia or sleeplessness associated with anxiety ICD-10-CM: F51.05, F41.9  ICD-9-CM: 293.84, 327.02  12/26/2018        Controlled substance agreement signed ICD-10-CM: Z79.899  ICD-9-CM: V58.69  5/24/2018    Overview Addendum 12/31/2020 12:07 PM by Charlene Thurston MD     2017 and annually              Prediabetes ICD-10-CM: R73.03  ICD-9-CM: 790.29  4/23/2017    Overview Signed 4/23/2017  9:16 PM by Charlene Thurston MD     Mild [id number]             Muscle contraction headaches & Neck Muscle Spasms ICD-10-CM: S35.236  ICD-9-CM: 307.81  4/10/2017    Overview Signed 7/6/2022  3:14 PM by Charlene Thurston MD     2016 Neurology Dr. Sadia Lao             Thrombocytosis ICD-10-CM: L31.450  ICD-9-CM: 238.71  3/22/2016    Overview Addendum 5/24/2018 10:44 AM by Charlene Thurston MD     Evaluation Hem-Onc Dr Benson Diane 2015: Based on the bone marrow biopsy and the lab results there is no clear reason for her thrombocytosis. There is no sign of a primary hematologic disorder. This is likely secondary thrombocytosis versus normal for her. All of the results that we have for her have been high-normal.  This makes malignancy less likely. She has seen rheumatology and there is no sign of autoimmune disorder. Imaging negative for malignancy. Lymphocytosis ICD-10-CM: X99.200  ICD-9-CM: 288.61  3/22/2016    Overview Addendum 7/6/2022  3:12 PM by MD Dr Benson Foss, 2015, then Dr. Yadiel Daley: R31.29  ICD-9-CM: 599.72  12/15/2015    Overview Signed 12/15/2015  3:47 PM by Charlene Thurston MD     [id number] Dr Bernardino Rehman.  KUB neg excpet small stone vs phlebolith             NAFL (nonalcoholic fatty liver) [id number]-20-DP: K76.0  ICD-9-CM: 571.8  8/27/2015    Overview Signed 8/27/2015  8:45 AM by MD Dr Jazmine Foss             Focal nodular hyperplasia of liver ICD-10-CM: K76.89  ICD-9-CM: 573.8  5/10/2015    Overview Signed 8/27/2015  8:45 AM by Charlene Thurston MD     GI Dr Jazmine Noel and Onc Dr Benson Diane             Vitamin D deficiency ICD-10-CM: E55.9  ICD-9-CM: 268.9  12/13/2011    Overview Signed 12/13/2011  1:16 PM by Charlene Thurston MD     10/2011             Mixed hyperlipidemia ICD-10-CM: E78.2  ICD-9-CM: 272.2  12/13/2011        Chronic Mechanical back pain ICD-10-CM: M54.9  ICD-9-CM: 724.5  10/25/2011        History of pyelonephritis, 1992 ICD-10-CM: Z87.448  ICD-9-CM: V13.02  10/25/2011        Allergic rhinitis ICD-10-CM: J30.9  ICD-9-CM: 477.9  10/25/2011    Overview Signed 10/25/2011  9:45 AM by Charlene Thurston MD     Worse in Spring             Cataract of right eye ICD-10-CM: H26.9  ICD-9-CM: 366.9  10/25/2011    Overview Signed 10/25/2011  9:46 AM by Charlene Thurston MD     Opth VEI, Dr Mónica Yepez             S/P Dog bite, 2003 ICD-10-CM: W54. 0XXA  ICD-9-CM: E906.0  10/25/2011    Overview Signed 10/25/2011  9:46 AM by Charlene Thurston MD     [facility]             Rosacea ICD-10-CM: L71.9  ICD-9-CM: 695.3  10/25/2011        Fibrocystic breast changes ICD-10-CM: N60.19  ICD-9-CM: 610.1  10/25/2011        Raynaud's syndrome ICD-10-CM: I73.00  ICD-9-CM: 443.0  Unknown    Overview Signed 8/27/2015  8:48 AM by Charlene Thurston MD     Autoimmune workup Dr Jovanna Harris [place] 2015             PMS (premenstrual syndrome) ICD-10-CM: N94.3  ICD-9-CM: 625.4  6/21/2010        Depression ICD-10-CM: T54. A  ICD-9-CM: 430  6/21/2010        Asthma, mild intermittent ICD-10-CM: J45.909  ICD-9-CM: 493.90  6/21/2010    Overview Signed 10/25/2011  9:45 AM by Charlene Thurston MD     Since ~ 22 yo             Primary dysmenorrhea ICD-10-CM: N94.4  ICD-9-CM: 625.3  6/21/2010        Anxiety attacks ICD-10-CM: F41.0  ICD-9-CM: 300.01  6/21/2010    Overview Signed 6/21/2010  8:28 PM by Charlene Thurston MD     March 2010                    PAST SURGICAL HISTORY     Past Surgical History:   Procedure Laterality Date    EKG TREADMILL STRESS TEST  May 2010    Dr Jerry Aguilar and Dr Michelle hodge for atypical CP; dx Anxiety    EMG TWO EXTREMITIES UPPER  1999    normal    HX COLONOSCOPY  06/15/2022    Tubular Adenoma, Repeat 5 yrs; Dr. Yunior Pink ENDOSCOPY  06/15/2022    Dr. Yunior Pink OTHER SURGICAL  3/2014 ordered by Dr Naty Graves, benign    HX SHOULDER ARTHROSCOPY  1996    left shoulder    US PELV NON OBS  2004    normal    XR SPINE LUMB 2 OR 3 V  1993    normal    XR SPINE THORAC 3 V  1993    normal    XR UPPER GI SERIES W KUB  2000    normal         MEDICATIONS     Current Outpatient Medications   Medication Sig    ALPRAZolam (XANAX) 0.5 mg tablet TAKE 1/2 TABLET BY MOUTH NIGHTLY AS NEEDED FOR ANXIETY OR SLEEP *MAX DAILY AMOUNT: 0.25MG*    amLODIPine (NORVASC) 5 mg tablet TAKE 1 TABLET BY MOUTH DAILY. TO COVER MAIL ORDER    spironolactone (ALDACTONE) 50 mg tablet TAKE 1 TABLET BY MOUTH EVERY DAY    cyclobenzaprine (FLEXERIL) 10 mg tablet Take 1 Tablet by mouth every eight (8) hours as needed for Muscle Spasm(s). propranolol LA (INDERAL LA) 60 mg SR capsule Take 1 Capsule by mouth daily. (Patient taking differently: Take 60 mg by mouth daily as needed. Prn tremors)    magnesium oxide (MAG-OX) 400 mg tablet Take 400 mg by mouth daily. sertraline (ZOLOFT) 50 mg tablet TAKE ONE AND ONE-HALF TABLETS DAILY    fluticasone propion-salmeteroL (Advair Diskus) 100-50 mcg/dose diskus inhaler USE 1 INHALATION EVERY 12 HOURS (Patient taking differently: using once a day)    atorvastatin (LIPITOR) 20 mg tablet TAKE 1 TABLET BY MOUTH EVERY DAY IN THE EVENING    omeprazole (PRILOSEC) 20 mg capsule Take 20 mg by mouth daily. albuterol (Ventolin HFA) 90 mcg/actuation inhaler Take 2 Puffs by inhalation every six (6) hours as needed for Wheezing. levonorgestrel-ethinyl estradiol (SEASONALE) 0.15 mg-30 mcg (91) 3MPk TAKE 1 TABLET DAILY    MILK THISTLE PO Take  by mouth daily. KRILL OIL PO Take  by mouth daily. ubidecarenone (CO Q-10 PO) Take  by mouth daily. fluticasone propionate (FLONASE NA) by Nasal route as needed. ascorbic acid, vitamin C, (VITAMIN C) 1,000 mg tablet Take 1,000 mg by mouth daily. turmeric (CURCUMIN) Take  by mouth daily. cranberry extract 450 mg tab tablet Take 450 mg by mouth. SLO-NIACIN PO Take 500 mg by mouth daily. loratadine (CLARITIN) 10 mg tablet Take 10 mg by mouth daily. aspirin delayed-release 81 mg tablet Take  by mouth daily. No current facility-administered medications for this visit.           ALLERGIES     Allergies   Allergen Reactions    Crestor [Rosuvastatin] Myalgia     Insomnia and muscle pain    Flonase [Fluticasone] Other (comments)     Cataracts enlarged    Hydrochlorothiazide Other (comments)     Insomnia, headache, ringing in ears, anxious    Nortriptyline Other (comments)     Increased depression    Other Medication Other (comments)     Pt is avoiding Nasal CS d/t cataracts    Sulfa (Sulfonamide Antibiotics) Hives          SOCIAL HISTORY     Social History     Tobacco Use    Smoking status: Former     Types: Cigarettes     Quit date: 10/25/1991     Years since quittin.2    Smokeless tobacco: Never    Tobacco comments:     smoked lightly for about 5 months   Substance Use Topics    Alcohol use: Never     Comment: very seldom     Social History     Social History Narrative    Life Partner     No children           Former Principal at [employer]    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 Now working on getting her weight back down; personal goal is ~ 200 lbs for now; got down to 180 lbs doing [employer]'s Diet 2017 x 1 yr                 Social History     Substance and Sexual Activity   Sexual Activity Never    Partners: Female    Comment: Same sex partner       IMMUNIZATIONS     Immunization History   Administered Date(s) Administered    COVID-19, PFIZER GRAY top, DO NOT Dilute, (age 15 y+), IM, 30 mcg/0.3 mL 2022    COVID-19, PFIZER PURPLE top, DILUTE for use, (age 15 y+), IM, 30mcg/0.3mL 2021, 2021, 10/02/2021    Influenza Vaccine 2014, 10/29/2017, 10/29/2017, 10/18/2018, 10/21/2021    Influenza, FLUARIX, FLULAVAL, [place] Clock (age 10 mo+) AND AFLURIA, (age 1 y+), PF, 0.5mL 10/13/2019, 2020    Meningococcal Vaccine 2002    PPD 1998, 1999    Pneumococcal Polysaccharide (PPSV-23) 2019    Rabies Vaccine 2003    TD Vaccine 1998, 2003    Tdap 2014, 2022         FAMILY HISTORY     Family History   Problem Relation Age of Onset    High Cholesterol Mother         high TG's in the 80's    Hypertension Mother     Thyroid Disease Mother     Other Mother         benign ovarian cysts    Anxiety Mother     Depression Mother     Diabetes Father         type 2    Cancer Father         Hodgkins    Arrhythmia Father 72        Ablation; chemo/radiation induced    Heart Surgery Father 72        stents placed    Lung Cancer Father          age 71 yo in     Stroke Father 70    Tremors Father         essential tremor    Anxiety Maternal Grandmother     Heart Disease Maternal Grandmother          80; h/o A.  Fib    Stroke Maternal Grandmother     Heart Attack Maternal Grandfather          age 68    Other Paternal Grandmother          GI bleed in her early 63's    Cancer Paternal Grandfather 61        pancreatic and liver cancer,  age 61         VITALS   Visit Vitals  /88 (BP 1 Location: Right upper arm, BP Patient Position: Sitting, BP Cuff Size: Large adult)   Pulse 96   Temp 98.1 °F (36.7 °C) (Oral)   Resp 16   Ht 5' 6\" (1.676 m)   Wt 227 lb 12.8 oz (103.3 kg)   SpO2 96%   BMI 36.77 kg/m²          PHYSICAL EXAMINATION   Physical Exam  Vitals reviewed. Constitutional:       General: She is not in acute distress. Cardiovascular:      Rate and Rhythm: Regular rhythm. Heart sounds: No murmur heard. Pulmonary:      Effort: Pulmonary effort is normal.      Breath sounds: Normal breath sounds. Skin:     General: Skin is warm and dry. Neurological:      General: No focal deficit present. Mental Status: She is alert. Psychiatric:         Mood and Affect: Mood is anxious. ASSESSMENT & PLAN   Diagnoses and all orders for this visit:    1. Mixed hyperlipidemia  Maintained on statin therapy w/ Lipitor 20 mg daily  -     LIPID PANEL; Future    2. Prediabetes  Keep working on reduced sugar, low carb diet, exercise and weight reduction  -     HEMOGLOBIN A1C WITH EAG; Future    3. NAFL (nonalcoholic fatty liver)  Keep working on reduced sugar, low carb diet, exercise and weight reduction  Continue routine follow up at the [place] [address] as directed  -     [address] [place] [address]; Future    4. Benign essential hypertension  Continue Norvasc 5 mg daily, Aldactone 50 mg daily and try taking the Inderal in the afternoon/evening time instead and take on a consistent basis to see if she tolerates that better    5. Generalized anxiety disorder with panic attacks  Controlled, stable on current regimen of Zoloft 75 mg daily and Xanax 0.5 mg 1/2 tablet nightly    6. Hyposomnia, insomnia or sleeplessness associated with anxiet  Controlled, stable on current regimen of Xanax 0.5 mg 1/2 tablet nightly. Anxiety, sleep health and controlled medication mgt counseling   pulled and reviewed. No problems noted.  Last filled 12-30-22 #15. She still has a refill remaining on that for now. UDS and CSA updated and on file 7-2022  Low abuse/misuse potential. Patient counseled and we discussed controlled medications, effects, side effects, indications, risks vs benefits, precautions, potential interactions/dangers w/ other medications, illicit drugs, potential for abuse/addiction/dependency/tolerance/withdrawal and the possible negative health implications/risks associated w/ overuse/abuse/misuse of controlled pain, stimulant or sedating medications including overdose and death. Patient expressed understanding and agreement with current plan of care. Fasting labs checked today  Cardiovascular risk and specific lipid/LDL/HgbA1c/BP goals assessed  Reviewed diet, nutrition, exercise, weight management, BMI/goals. Age/risk based screening recommendations, health maintenance & prevention counseling. Cancer screening USPTFS guidelines reviewed w/ pt today. Discussed benefits/positive/negative outcomes of screening based on age/risk stratification. Informed consent for/against screening based on pt's personal hx/risk factors. Updated in history above and health maintenance. Reviewed medications and side effects   Further follow up & other recommendations pending review of labs.    She will try taking the Inderal in the afternoon/evening time instead and take on a consistent basis to see if she tolerates that better  If all remains good and stable, return in 6 months for next follow up medication check

## 2023-02-26 DIAGNOSIS — F41.9 HYPOSOMNIA, INSOMNIA OR SLEEPLESSNESS ASSOCIATED WITH ANXIETY: ICD-10-CM

## 2023-02-26 DIAGNOSIS — F41.0 GENERALIZED ANXIETY DISORDER WITH PANIC ATTACKS: ICD-10-CM

## 2023-02-26 DIAGNOSIS — F51.05 HYPOSOMNIA, INSOMNIA OR SLEEPLESSNESS ASSOCIATED WITH ANXIETY: ICD-10-CM

## 2023-02-26 DIAGNOSIS — F41.1 GENERALIZED ANXIETY DISORDER WITH PANIC ATTACKS: ICD-10-CM

## 2023-02-27 RX ORDER — ALPRAZOLAM 0.5 MG/1
TABLET ORAL
Qty: 15 TABLET | Refills: 5 | Status: SHIPPED | OUTPATIENT
Start: 2023-02-27

## 2023-03-06 ENCOUNTER — OFFICE VISIT (OUTPATIENT)
Dept: HEMATOLOGY | Age: 53
End: 2023-03-06

## 2023-03-06 VITALS
HEART RATE: 93 BPM | WEIGHT: 226 LBS | HEIGHT: 66 IN | RESPIRATION RATE: 17 BRPM | TEMPERATURE: 97.8 F | BODY MASS INDEX: 36.32 KG/M2 | OXYGEN SATURATION: 97 % | SYSTOLIC BLOOD PRESSURE: 115 MMHG | DIASTOLIC BLOOD PRESSURE: 93 MMHG

## 2023-03-06 DIAGNOSIS — K76.0 NAFL (NONALCOHOLIC FATTY LIVER): Primary | ICD-10-CM

## 2023-03-06 PROCEDURE — 91200 LIVER ELASTOGRAPHY: CPT | Performed by: PHYSICIAN ASSISTANT

## 2023-03-06 PROCEDURE — 99214 OFFICE O/P EST MOD 30 MIN: CPT | Performed by: PHYSICIAN ASSISTANT

## 2023-03-06 PROCEDURE — 3080F DIAST BP >= 90 MM HG: CPT | Performed by: PHYSICIAN ASSISTANT

## 2023-03-06 PROCEDURE — 3074F SYST BP LT 130 MM HG: CPT | Performed by: PHYSICIAN ASSISTANT

## 2023-03-06 NOTE — PROGRESS NOTES
[address] [facility], [place], [address], [place], [place]      Cheryle Cluster, PA-C    April S Raymon, AGPCNP-BC   Tone Renteria, ACNPC-AG   Rachele Maurer, FNP-C  Clemencia Pizarro, FNP-C   Pravin Birmingham, AGPCNP-BC      [address]   at [address], [address]  [address]   [phone]   FAX: [phone]   at [address], [address] [place] [address], [address]   [phone]   FAX: [phone]       Patient Care Team:  Vidya Cisneros MD as PCP - General  Vidya Cisneros MD as PCP - [facility] Provider  Francy Hunter DO (Hematology and Oncology)      Problem List  Date Reviewed: 1/23/2023            Codes Class Noted    Excessive physiologic tremor ICD-10-CM: R25.1  ICD-9-CM: 333.1  7/6/2022    Overview Signed 7/6/2022  3:14 PM by Vidya Cisneros MD     Neurology Evaluation 2016 Dr. Miguel Rhodes: O86  ICD-9-CM: 780.4  5/24/2022    Overview Signed 5/24/2022  8:53 PM by Vidya Cisneros MD     [facility] 0-2425, Labs, EKG, head CT negative; treated effectively w/ meds             Hyposomnia, insomnia or sleeplessness associated with anxiety ICD-10-CM: F51.05, F41.9  ICD-9-CM: 293.84, 327.02  12/26/2018        Controlled substance agreement signed ICD-10-CM: Z79.899  ICD-9-CM: V58.69  5/24/2018    Overview Addendum 12/31/2020 12:07 PM by Vidya Cisneros MD     2017 and annually              Prediabetes ICD-10-CM: R73.03  ICD-9-CM: 790.29  4/23/2017    Overview Signed 4/23/2017  9:16 PM by Vidya Cisneros MD     Mild [id number]             Muscle contraction headaches & Neck Muscle Spasms ICD-10-CM: N15.989  ICD-9-CM: 307.81  4/10/2017    Overview Signed 7/6/2022  3:14 PM by Vidya Cisneros MD     2016 Neurology Dr. Domingo Kevin             Thrombocytosis ICD-10-CM: S32.098  ICD-9-CM: 238.71  3/22/2016    Overview Addendum 5/24/2018 10:44 AM by Stone Choudhury MD     Evaluation Hem-Onc Dr Dino Spangler 2015: Based on the bone marrow biopsy and the lab results there is no clear reason for her thrombocytosis. There is no sign of a primary hematologic disorder. This is likely secondary thrombocytosis versus normal for her. All of the results that we have for her have been high-normal.  This makes malignancy less likely. She has seen rheumatology and there is no sign of autoimmune disorder. Imaging negative for malignancy. Lymphocytosis ICD-10-CM: V88.303  ICD-9-CM: 288.61  3/22/2016    Overview Addendum 7/6/2022  3:12 PM by MD Dr Dino Olivares, 2015, then Dr. Sloan Bridge: R31.29  ICD-9-CM: 599.72  12/15/2015    Overview Signed 12/15/2015  3:47 PM by Stone Choudhury MD     [id number] [facility] Livier Rose.  KUB neg excpet small stone vs phlebolith             NAFL (nonalcoholic fatty liver) [id number]-06-HS: K76.0  ICD-9-CM: 571.8  8/27/2015    Overview Signed 8/27/2015  8:45 AM by MD Dr Allison Olivares             Focal nodular hyperplasia of liver ICD-10-CM: K76.89  ICD-9-CM: 573.8  5/10/2015    Overview Signed 8/27/2015  8:45 AM by Stone Choudhury MD     GI Dr Allison Gutierrez and Onc Dr Dino Spangler             Benign essential hypertension ICD-10-CM: I10  ICD-9-CM: 401.1  2/24/2015    Overview Signed 2/24/2015 10:21 AM by Stone Choudhury MD     [id number]             Vitamin D deficiency ICD-10-CM: E55.9  ICD-9-CM: 268.9  12/13/2011    Overview Signed 12/13/2011  1:16 PM by Stone Choudhury MD     10/2011             Mixed hyperlipidemia ICD-10-CM: K64.5  ICD-9-CM: 272.2  12/13/2011        Chronic Mechanical back pain ICD-10-CM: M54.9  ICD-9-CM: 724.5  10/25/2011        History of pyelonephritis, 1992 ICD-10-CM: S64.176  ICD-9-CM: V13.02  10/25/2011        Allergic rhinitis ICD-10-CM: J30.9  ICD-9-CM: 477.9  10/25/2011    Overview Signed 10/25/2011  9:45 AM by Leon Gilmore MD     Worse in Spring             Cataract of right eye ICD-10-CM: H26.9  ICD-9-CM: 366.9  10/25/2011    Overview Signed 10/25/2011  9:46 AM by Leon Gilmore MD     Opth VEI, Dr Russel Mueller             S/P Dog bite, 2003 ICD-10-CM: Samina Meng. 0XXA  ICD-9-CM: E906.0  10/25/2011    Overview Signed 10/25/2011  9:46 AM by Leon Gilmore MD     Td and Rabies Series             Rosacea ICD-10-CM: L71.9  ICD-9-CM: 695.3  10/25/2011        Fibrocystic breast changes ICD-10-CM: N60.19  ICD-9-CM: 610.1  10/25/2011        Raynaud's syndrome ICD-10-CM: I73.00  ICD-9-CM: 443.0  Unknown    Overview Signed 8/27/2015  8:48 AM by Leon Gilmore MD     Autoimmune workup Dr Kavon Aguayo Spring 2015             PMS (premenstrual syndrome) ICD-10-CM: N94.3  ICD-9-CM: 625.4  6/21/2010        Depression ICD-10-CM: I93. A  ICD-9-CM: 591  6/21/2010        Asthma, mild intermittent ICD-10-CM: J45.909  ICD-9-CM: 493.90  6/21/2010    Overview Signed 10/25/2011  9:45 AM by Leon Gilmore MD     Since ~ 22 yo             Primary dysmenorrhea ICD-10-CM: N94.4  ICD-9-CM: 625.3  6/21/2010        Anxiety attacks ICD-10-CM: F41.0  ICD-9-CM: 300.01  6/21/2010    Overview Signed 6/21/2010  8:28 PM by Leon Gilmore MD     March 2010                  [facility]val[facility] for management of non-alcoholic fatty liver (NAFL). The active problem list, all pertinent past medical history, medications, radiologic findings and laboratory findings related to the liver disorder were reviewed and discussed with the patient. She was originally evaluated in this clinic in 2015 for steatosis and ?fatty lesion on imaging.  She has been sent back for additional assessment as she has had some nagging RUQ pain and ongoing imaging changes on past CT, 12/2021. The patient is a 46 y.o.  female who is suspected to have fatty liver disease based upon imaging studies. The CT scan was performed in 4/2015 to evalaute the spleen because of thrombocytosis. This demonstrated a 4.7 cm mass in segment 5 of the right lobe of liver with no rim enhancement. An MRI of the liver as performed in 4/2015. This also demonstrated a 4.7 enhancing mass with washout but no delayed rim enhancement most consistent with FNH. Another MRI was performed in 11/2015 showing resolution of \"mass\" and normal tissue in that area. Serologic evaluation for various causes of liver disease was either all negative or within the limits of normal.      A liver biopsy has not been performed. We have performed Fibroscan with no increased fibrosis and CAP scores consistent with fatty liver. The most recent laboratory studies from 1/2023 indicate that the liver transaminases are normal, ALP is normal, tests of hepatic synthetic and metabolic function are normal, and the platelet count is elevated. The patient had reported mild RUQ \"bruisy\" pain, which she has reported as being better/resolved over the course of the past 12 months. She has noted some improvement in symptoms since starting a trial of omeprazole and reports that her EGD in 6/2022 showed no concerns. She is now taking 20 mg omeprazole on prn basis only. The patient has no limitations in functional activities. She has been making some attempts at dietary changes and reports that he weight is down ~10# in the past 6 months.       ALLERGIES  Allergies   Allergen Reactions    Crestor [Rosuvastatin] Myalgia     Insomnia and muscle pain    Flonase [Fluticasone] Other (comments)     Cataracts enlarged    Hydrochlorothiazide Other (comments)     Insomnia, headache, ringing in ears, anxious    Nortriptyline Other (comments)     Increased depression    Other Medication Other (comments) Pt is avoiding Nasal CS d/t cataracts    Sulfa (Sulfonamide Antibiotics) Hives       MEDICATIONS  Current Outpatient Medications   Medication Sig    ALPRAZolam (XANAX) 0.5 mg tablet TAKE 1/2 TABLET BY MOUTH NIGHTLY AS NEEDED FOR ANXIETY OR SLEEP *MAX DAILY AMOUNT: 0.25MG*    amLODIPine (NORVASC) 5 mg tablet TAKE 1 TABLET BY MOUTH DAILY. TO COVER MAIL ORDER    spironolactone (ALDACTONE) 50 mg tablet TAKE 1 TABLET BY MOUTH EVERY DAY    cyclobenzaprine (FLEXERIL) 10 mg tablet Take 1 Tablet by mouth every eight (8) hours as needed for Muscle Spasm(s).    magnesium oxide (MAG-OX) 400 mg tablet Take 400 mg by mouth daily. sertraline (ZOLOFT) 50 mg tablet TAKE ONE AND ONE-HALF TABLETS DAILY    fluticasone propion-salmeteroL (Advair Diskus) 100-50 mcg/dose diskus inhaler USE 1 INHALATION EVERY 12 HOURS (Patient taking differently: using once a day)    atorvastatin (LIPITOR) 20 mg tablet TAKE 1 TABLET BY MOUTH EVERY DAY IN THE EVENING    omeprazole (PRILOSEC) 20 mg capsule Take 20 mg by mouth daily. albuterol (Ventolin HFA) 90 mcg/actuation inhaler Take 2 Puffs by inhalation every six (6) hours as needed for Wheezing. levonorgestrel-ethinyl estradiol (SEASONALE) 0.15 mg-30 mcg (91) 3MPk TAKE 1 TABLET DAILY    MILK THISTLE PO Take  by mouth daily. KRILL OIL PO Take  by mouth daily. ubidecarenone (CO Q-10 PO) Take  by mouth daily. fluticasone propionate (FLONASE NA) by Nasal route as needed. ascorbic acid, vitamin C, (VITAMIN C) 1,000 mg tablet Take 1,000 mg by mouth daily. turmeric (CURCUMIN) Take  by mouth daily. cranberry extract 450 mg tab tablet Take 450 mg by mouth. SLO-NIACIN PO Take 500 mg by mouth daily. loratadine (CLARITIN) 10 mg tablet Take 10 mg by mouth daily. aspirin delayed-release 81 mg tablet Take  by mouth daily. propranolol LA (INDERAL LA) 60 mg SR capsule Take 1 Capsule by mouth daily. (Patient taking differently: Take 60 mg by mouth daily as needed.  Prn tremors Taking every other day, trying to ween off)     No current facility-administered medications for this visit. SYSTEM REVIEW NOT RELATED TO LIVER DISEASE OR REVIEWED ABOVE:  Constitution systems: Negative for fever, chills. Weight loss of ~10# in the past year. Eyes: Negative for visual changes. ENT: Negative for sore throat, painful swallowing. Respiratory: Negative for cough, hemoptysis, SOB. Cardiology: Negative for chest pain, palpitations. GI:  Negative for constipation or diarrhea. Positive for RUQ pain. : Negative for urinary frequency, dysuria, hematuria, nocturia. Skin: Negative for rash. Hematology: Negative for easy bruising, blood clots. Musculo-skeletal: Negative for back pain, muscle pain, weakness. Neurologic: Negative for headaches, dizziness, vertigo, memory problems not related to HE. Psychology: Negative for anxiety, depression. FAMILY HISTORY:  A grandfather  of liver cancer  The father has the following chronic diseases: hodgkins disease. The mother has the following chronic diseases: HTN, TBI. SOCIAL HISTORY:  The patient has never been . The patient has no children. The patient has never used tobacco products. The patient has never consumed significant amounts of alcohol. 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 PHYSICAL EXAMINATION:  VS: per nursing note  General: No acute distress. Eyes: Sclera anicteric. ENT: No oral lesions. Skin: No rashes. spider angiomata. No jaundice. Abdomen: No obvious distention suggesting ascites. Extremities: No edema. No muscle wasting. Neurologic: Alert and oriented. Cranial nerves grossly intact.     LABORATORY STUDIES:  Liver [place] [facility] [address] Units 2023   WBC 3.4 - 10.8 x10E3/uL     ANC 1.4 - 7.0 x10E3/uL     HGB 11.1 - 15.9 g/dL      - 450 x10E3/uL     AST 15 - 37 U/L 14 (L)    ALT 12 - 78 U/L 28    Alk Phos 45 - 117 U/L 94    Bili, Total 0.2 - 1.0 MG/DL 0.4    Bili, Direct 0.00 - 0.40 mg/dL     Albumin 3.5 - 5.0 g/dL 4.0    BUN 6 - 20 MG/DL 19 18   Creat 0.55 - 1.02 MG/DL 0.86 0.95   Na 136 - 145 mmol/L 137 139   K 3.5 - 5.1 mmol/L 5.2 (H) 4.4   Cl 97 - 108 mmol/L 107 106   CO2 21 - 32 mmol/L 24 24   Glucose 65 - 100 mg/dL 95 102 (H)     Liver [place] [address] Ref Rng & Units 7/22/2022   WBC 3.4 - 10.8 x10E3/uL    ANC 1.4 - 7.0 x10E3/uL    HGB 11.1 - 15.9 g/dL     - 450 x10E3/uL    AST 15 - 37 U/L 20   ALT 12 - 78 U/L 40   Alk Phos 45 - 117 U/L 106   Bili, Total 0.2 - 1.0 MG/DL 0.3   Bili, Direct 0.00 - 0.40 mg/dL    Albumin 3.5 - 5.0 g/dL 3.7   BUN 6 - 20 MG/DL 16   Creat 0.55 - 1.02 MG/DL 0.82   Na 136 - 145 mmol/L 134 (L)   K 3.5 - 5.1 mmol/L 5.3 (H)   Cl 97 - 108 mmol/L 99   CO2 21 - 32 mmol/L 27   Glucose 65 - 100 mg/dL 102 (H)     SEROLOGIES:  5/2015. HAV total negative, anti-HBsurface positive, HCV RNA undetectable, Ferritin 124, DIMPLE negative. Serologies Latest Ref Rng & Units 7/3/2018   Ferritin 15 - 150 ng/mL 152 (H)   Iron % Saturation 15 - 55 % 35     LIVER HISTOLOGY:  3/2022. FibroScan performed at [address]. EkPa was 3.5. Suggested fibrosis level is F0-1. CAP score is 300, this is consistent with steatosis. 3/2023. FibroScan performed at [address]. EkPa was 3.4. Suggested fibrosis level is F0-1. CAP score is 268, this is consistent with steatosis. ENDOSCOPIC PROCEDURES:  6/2022. EGD performed by Dr He Young. No new findings, biopsy normal.  PPI decreased from 40 mg to 20 mg.   6/2022. Colon performed by Dr He Young. One polyp removed, tubular adenoma. Repeat in 6/2027    RADIOLOGY:  4/2015. CT scan abdomen with and without IV contrast.  Changes consistent with fatty liver. Enhancing liver mass with washout and no rim enhancement on delayed images in the right lobe measuring 4.7 cm.    4/2015. Dynamic MRI of liver. Changes consistent with fatty liver. Enhancing liver mass with washout and no rim enhancement on delayed images in the right lobe measuring 4.7 cm. Most consistent with focal nodular hyperplasia. 11/2015. Dynamic MRI of liver. Resolution of all fatty liver except for small area of hypodensity in segment 5. The previously seen mass in the right lobe has now resolved and the area is consistent with normal appearing liver. 12/2021. CT abdomen. Hepatic steatosis. Hypodensity of liver, not mass-like and unchanged in appearance from 2015. OTHER TESTING:  Not available or performed    ASSESSMENT AND PLAN:  NAFL. Suspected fatty liver disease supported by Fibroscan and imaging. The previously seen mass in the right lobe which was thought to be a focal nodular hyperplasia in retrospect was thought to represent a focal fatty infiltraiton and has now resolved. She continues to have generalized steatosis suggested on the 12/2021 CT scan and may continue to have area of focal fatty sparing. We continue to be pleased to see no suggestion of advanced fibrosis on Fibroscan and she is very encouraged to see the CAP score is down with her intentional weight losses. We plan on repeating this study in 12 months for verification of progression. If this remains in normal ranges, will consider release from further follow-up. I have asked her to continue to focus on weight reduction with a goal of <215# by her next office visit. I have congratulated her on her weight reduction thus far and she is encouraged by the tangible reduction in liver enzymes and CAP score. Lab results from 1/2023 reviewed. Liver transaminases are normal.  Alkaline phosphate is normal.  Liver function is normal.      No additional imaging of the liver indicated at this time. I have reviewed her past imaging in detail with the patient, including the recent CT from 12/2021.      The patient was directed to continue all current medications at the current dosages. There are no contraindications for the patient to take any medications that are necessary for treatment of other medical issues including medications for diabetes mellitus and hypercholesterolemia. The patient was counseled regarding alcohol consumption and that this could contribute to fatty liver disease. Epigastric/RUQ pain. As above, this may be due to hepatomegaly from NAFL, but she has reported improvement with use of PPI. This dose was reduced to 20 mg in 6/2022 following negative EGD evaluation. FOLLOW-UP:  All of the issues listed above in the Assessment and Plan were discussed with the patient. All questions were answered. The patient expressed a clear understanding of the above. [address] [id number] in 6 months for virtual visit, repeat FS in 3/2023. Documentation reviewed and updated to reflect current, accurate patient information.     Leland Dotson PA-C  Liver [facility], [address] [facility] 22.  [phone]  [address] [place] [address]

## 2023-03-06 NOTE — PROGRESS NOTES
Identified pt with two pt identifiers(name and ). Reviewed record in preparation for visit and have obtained necessary documentation. Chief Complaint   Patient presents with    Fatty Liver     One yr Fibroscan f/u      Vitals:    23 0809   BP: (!) 115/93   Pulse: 93   Resp: 17   Temp: 97.8 °F (36.6 °C)   TempSrc: Temporal   SpO2: 97%   Weight: 226 lb (102.5 kg)   Height: 5' 6\" (1.676 m)   PainSc:   0 - No pain       Health Maintenance Review: Patient reminded of \"due or due soon\" health maintenance. I have asked the patient to contact his/her primary care provider (PCP) for follow-up on his/her health maintenance. Coordination of Care Questionnaire:  :   1) Have you been to an emergency room, urgent care, or hospitalized since your last visit? If yes, where when, and reason for visit? no       2. Have seen or consulted any other health care provider since your last visit? If yes, where when, and reason for visit? NO      Patient is accompanied by self I have received verbal consent from Bassem Candelaria to discuss any/all medical information while they are present in the room.

## 2023-04-28 ENCOUNTER — OFFICE VISIT (OUTPATIENT)
Dept: NEUROLOGY | Age: 53
End: 2023-04-28
Payer: COMMERCIAL

## 2023-04-28 VITALS
OXYGEN SATURATION: 95 % | SYSTOLIC BLOOD PRESSURE: 128 MMHG | HEIGHT: 68 IN | WEIGHT: 227 LBS | BODY MASS INDEX: 34.4 KG/M2 | DIASTOLIC BLOOD PRESSURE: 76 MMHG | HEART RATE: 79 BPM

## 2023-04-28 DIAGNOSIS — T88.7XXA MEDICATION SIDE EFFECT: ICD-10-CM

## 2023-04-28 DIAGNOSIS — G43.009 MIGRAINE WITHOUT AURA AND WITHOUT STATUS MIGRAINOSUS, NOT INTRACTABLE: Primary | ICD-10-CM

## 2023-04-28 DIAGNOSIS — G25.0 BENIGN ESSENTIAL TREMOR: ICD-10-CM

## 2023-04-28 PROCEDURE — 99214 OFFICE O/P EST MOD 30 MIN: CPT | Performed by: PSYCHIATRY & NEUROLOGY

## 2023-04-28 PROCEDURE — 3074F SYST BP LT 130 MM HG: CPT | Performed by: PSYCHIATRY & NEUROLOGY

## 2023-04-28 PROCEDURE — 3078F DIAST BP <80 MM HG: CPT | Performed by: PSYCHIATRY & NEUROLOGY

## 2023-04-28 RX ORDER — PROPRANOLOL HYDROCHLORIDE 20 MG/1
20 TABLET ORAL 2 TIMES DAILY
Qty: 180 TABLET | Refills: 1 | Status: SHIPPED | OUTPATIENT
Start: 2023-04-28

## 2023-04-28 NOTE — PROGRESS NOTES
Neurology Clinic Follow up Note    Patient ID:  Sharon Ward  [id number]  46 y.o.  1970      Ms. Stevie Montero is here for follow up today of  Chief Complaint   Patient presents with    Other     Pt returning for FU tremors,  Pt. Has side effects from Propranolol so takes every other day not every day however helps tremors when taking every day          Last Appointment With Me:  10/17/22    Manuela Nj is a 46 y.o.  female presenting for evaluation of headaches. 5/23/22 she was seen in the ED due to room spinning/vertigo with emesis. She was diagnosed as vertigo with eventual resolution after 2 days. She experienced a subsequent headache 3 days thereafter lasting approximately 1 week. She has a h/o headache since childhood occurring sporadically, typically of shorter duration. Location: All over  Character: Pressure, ache  Intensity: On average 8/10   Frequency: More severe headaches once every 2 weeks, more mild headache twice weekly  # HA free days per month: 20+  Duration: 12 hours  Aura: None  Associated Sx with HA: +nausea, +phonophotophobia. Neurological ROS: Denies focal weakness, numbness or vision loss associated with headaches. Systemic ROS:   +Snoring, no JUAN MANUEL on prior PSG  Caffeine use: Reports 100-150mg daily of caffeine  H/O Head trauma: None  Depressive or anxiety Sx: On treatment, controlled    Any change in pattern of HA? See above    Triggers: Stress. Non-positional.   Alleviating factors: Rest/sleep  FHx HA/migraine: Mother    Treatment so far: Naproxen-typically works for her except for her most recent headache    Investigations so far: MRI Brain 2016 [facility], repeat MRI Brain 12/2021 unchanged per patient (no records available for review)\"    Interval History:   Headaches are significantly improved on betablockers.  Hand tremors have also attenuated with introduction of medication, however she noted increased anxiety and occasional dizziness in the afternoons when taking medication daily. She self weaned to every other day dosing with some noted improvement in side effects. PMHx/ PSHx/ FHx/ SHx:  Reviewed and unchanged previous visit. Past Medical History:   Diagnosis Date    Allergic rhinitis 10/25/2011    Anxiety attacks 6/21/2010    Asthma, mild intermittent 6/21/2010    Benign essential hypertension 2/24/2015    [id number]    Cataract of right eye 10/25/2011    Chronic Mechanical back pain 10/25/2011    Controlled substance agreement signed 5/24/2018 11-29-17    Depression     Dog bite(E906.0)     Td and Rabies series    Dysmenorrhea     Excessive physiologic tremor 7/6/2022    Neurology Evaluation 2016 Dr. Aimee Cool    Fibrocystic breast changes 10/25/2011    History of pyelonephritis, 1992 10/25/2011    Hyposomnia, insomnia or sleeplessness associated with anxiety 12/26/2018    Lymphocytosis 3/22/2016    Dr Steve Blanco, 2015    Microhematuria 12/15/2015    7-2014 [facility] Dr Sammy Briones. KUB neg excpet small stone vs phlebolith    Mixed hyperlipidemia 12/13/2011    Muscle contraction headaches & Neck Muscle Spasms 4/10/2017    NAFL (nonalcoholic fatty liver) 4/43/2392    Dr Florentino Tucker    PMS (premenstrual syndrome) 6/21/2010    Primary dysmenorrhea 6/21/2010    Raynaud's syndrome     Rosacea     Thrombocytosis 3/22/2016    Dr Steve Blanco 2015    Vegetarian     Vertigo 5/24/2022    [facility] 5-2022, Labs, EKG, head CT negative; treated effectively w/ meds    Vitamin D deficiency 12/13/2011         ROS:  Comprehensive review of systems negative except for as noted above. Objective:       Meds:  Current Outpatient Medications   Medication Sig Dispense Refill    atorvastatin (LIPITOR) 20 mg tablet TAKE 1 TABLET DAILY IN THE EVENING 90 Tablet 3    ALPRAZolam (XANAX) 0.5 mg tablet TAKE 1/2 TABLET BY MOUTH NIGHTLY AS NEEDED FOR ANXIETY OR SLEEP *MAX DAILY AMOUNT: 0.25MG* 15 Tablet 5    amLODIPine (NORVASC) 5 mg tablet TAKE 1 TABLET BY MOUTH DAILY.  TO COVER MAIL ORDER 90 Tablet 1 spironolactone (ALDACTONE) 50 mg tablet TAKE 1 TABLET BY MOUTH EVERY DAY 90 Tablet 1    cyclobenzaprine (FLEXERIL) 10 mg tablet Take 1 Tablet by mouth every eight (8) hours as needed for Muscle Spasm(s). 30 Tablet 0    propranolol LA (INDERAL LA) 60 mg SR capsule Take 1 Capsule by mouth daily. (Patient taking differently: Take 1 Capsule by mouth daily as needed. Prn tremors   Taking every other day, trying to ween off) 30 Capsule 3    magnesium oxide (MAG-OX) 400 mg tablet Take 1 Tablet by mouth daily. sertraline (ZOLOFT) 50 mg tablet TAKE ONE AND ONE-HALF TABLETS DAILY 135 Tablet 3    fluticasone propion-salmeteroL (Advair Diskus) 100-50 mcg/dose diskus inhaler USE 1 INHALATION EVERY 12 HOURS (Patient taking differently: using once a day) 3 Each 3    omeprazole (PRILOSEC) 20 mg capsule Take 1 Capsule by mouth daily. albuterol (Ventolin HFA) 90 mcg/actuation inhaler Take 2 Puffs by inhalation every six (6) hours as needed for Wheezing. 1 Each 1    levonorgestrel-ethinyl estradiol (SEASONALE) 0.15 mg-30 mcg (91) 3MPk TAKE 1 TABLET DAILY 3 Dose Pack 1    MILK THISTLE PO Take  by mouth daily. KRILL OIL PO Take  by mouth daily. ubidecarenone (CO Q-10 PO) Take  by mouth daily. fluticasone propionate (FLONASE NA) by Nasal route as needed. ascorbic acid, vitamin C, (VITAMIN C) 1,000 mg tablet Take 1 Tablet by mouth daily. turmeric (CURCUMIN) Take  by mouth daily. cranberry extract 450 mg tab tablet Take 1 Tablet by mouth. SLO-NIACIN PO Take 500 mg by mouth daily. loratadine (CLARITIN) 10 mg tablet Take 1 Tablet by mouth daily. aspirin delayed-release 81 mg tablet Take  by mouth daily.          Exam:  Visit Vitals  /76   Pulse 79   Ht 5' 8\" (1.727 m)   Wt 227 lb (103 kg)   SpO2 95%   BMI 34.52 kg/m²     NEUROLOGICAL EXAM:  General: Awake, alert, speech fluent  CN: PERRL, EOMI without nystagmus, VFF to confrontation, facial sensation and strength are normal and symmetric, hearing is intact to finger rub bilaterally, palate and tongue movements are intact and symmetric. Motor: Normal tone, bulk and strength bilaterally. Reflexes: 2/4 and symmetric, plantar stimulation is flexor. Coordination: No dysmetria. Normal rapid alternating movements; finger-to-nose and heel-to- shin testing are within normal limits. Mild action tremor R>L hand on FTN (improved on examination). No bradykinesia/rigidity. Sensation: LT intact throughout. Gait: Normal-based and steady. LABS  Results for orders placed or performed in visit on 01/23/23   HEMOGLOBIN A1C WITH EAG   Result Value Ref Range    Hemoglobin A1c 5.6 4.0 - 5.6 %    Est. average glucose 114 mg/dL   LIPID PANEL   Result Value Ref Range    Cholesterol, total 168 <200 MG/DL    Triglyceride 241 (H) <150 MG/DL    HDL Cholesterol 39 MG/DL    LDL, calculated 80.8 0 - 100 MG/DL    VLDL, calculated 48.2 MG/DL    CHOL/HDL Ratio 4.3 0.0 - 5.0     METABOLIC PANEL, COMPREHENSIVE   Result Value Ref Range    Sodium 137 136 - 145 mmol/L    Potassium 5.2 (H) 3.5 - 5.1 mmol/L    Chloride 107 97 - 108 mmol/L    CO2 24 21 - 32 mmol/L    Anion gap 6 5 - 15 mmol/L    Glucose 95 65 - 100 mg/dL    BUN 19 6 - 20 MG/DL    Creatinine 0.86 0.55 - 1.02 MG/DL    BUN/Creatinine ratio 22 (H) 12 - 20      eGFR >60 >60 ml/min/1.73m2    Calcium 9.9 8.5 - 10.1 MG/DL    Bilirubin, total 0.4 0.2 - 1.0 MG/DL    ALT (SGPT) 28 12 - 78 U/L    AST (SGOT) 14 (L) 15 - 37 U/L    Alk. phosphatase 94 45 - 117 U/L    Protein, total 7.4 6.4 - 8.2 g/dL    Albumin 4.0 3.5 - 5.0 g/dL    Globulin 3.4 2.0 - 4.0 g/dL    A-G Ratio 1.2 1.1 - 2.2         IMAGING:  MRI Results (most recent):  Results from Hospital Encounter encounter on 04/21/16    MRI BRAIN W WO CONT    Narrative  **Final Report**      ICD Codes / Adm. Diagnosis: 238.71  339.12 / Essential thrombocythemia ([facility] [address]  Chronic tension type headach  Examination:  MR BRAIN W AND WO CON  - [id number] - Apr 21 2016 8:08PM  Accession No:  [id number]  Reason:  headaches, dizziness, thrombocytosis      REPORT:  EXAM:  MR BRAIN W AND WO CON    INDICATION:    headaches, dizziness, thrombocytosis    COMPARISON:  None. CONTRAST: 7.5 ml Gadavist    TECHNIQUE:  MR imaging of the brain was performed with sagittal T1, axial T1, T2, FLAIR,  GRE, DWI/ADC; multiplanar T1 images prior to and following uneventful  intravenous contrast administration. FINDINGS: There is an incidental septum cavum pellucidum. The ventricles are  normal in size. There are several scattered small foci of subcortical white  matter disease likely related to minimal chronic small vessel ischemic  disease. There is no evidence of mass, hemorrhage, acute infarct or abnormal  extra-axial fluid collection. Normal appearing flow-voids are present in  the vertebral, basilar and carotid artery systems. The craniocervical  junction is normal.  The structures at the cranial base including paranasal  sinuses and mastoid air cells are unremarkable. There is no abnormal  parenchymal or meningeal enhancement. Impression  :  Minimal white matter disease likely chronic small vessel ischemic disease. No evidence of acute process. Signing/Reading Doctor: Danita Haley ([id number])  Approved: [employer] ([id number])  Apr 22 2016  8:45AM          Assessment:     Encounter Diagnoses     ICD-10-CM ICD-9-CM   1. Migraine without aura and without status migrainosus, not intractable  G43.009 346.10   2. Benign essential tremor  G25.0 333.1   3. Medication side effect  T88. 7XXA 80.20   46year old female here for follow up of headaches as well as progressive hand tremors. Headaches appears most consistent with migraine w/o aura. She has completed intracranial imaging in 2016 due to headaches and more recently 12/2021 without noted pathology. Neurological examination is non-focal apart from mild action predominant hand tremors b/l most consistent with benign essential tremor. Tremors and migraines have improved with introduction of propranolol, however she has experienced side effects related to medication including dizziness as well as anxiety. We discussed transition to propranolol 20mg BID in an effort to reduce side effects. Plan:   Transition to Propranolol 20mg BID for migraine prophylaxis and ET  Continue Imitrex 50mg PRN for migraine rescue therapy    Follow-up and Dispositions    Return in about 6 months (around 10/28/2023). I have discussed the diagnosis with the patient today and the intended plan as seen in the above orders with both the patient as well as referring provider and/or PCP via electronic correspondence. The patient has received an after-visit summary and questions were answered concerning future plans. I have discussed medication side effects and warnings with the patient as well.       Signed:  Radha Inman DO  4/28/2023  8:19 AM

## 2023-05-23 DIAGNOSIS — I10 ESSENTIAL (PRIMARY) HYPERTENSION: ICD-10-CM

## 2023-05-25 RX ORDER — SPIRONOLACTONE 50 MG/1
TABLET, FILM COATED ORAL
Qty: 90 TABLET | Refills: 0 | Status: SHIPPED | OUTPATIENT
Start: 2023-05-25

## 2023-06-19 RX ORDER — PROPRANOLOL HCL 60 MG
CAPSULE, EXTENDED RELEASE 24HR ORAL
Qty: 30 CAPSULE | Refills: 3 | OUTPATIENT
Start: 2023-06-19

## 2023-06-22 DIAGNOSIS — I10 ESSENTIAL (PRIMARY) HYPERTENSION: ICD-10-CM

## 2023-06-22 DIAGNOSIS — I10 BENIGN ESSENTIAL HYPERTENSION: Primary | ICD-10-CM

## 2023-06-22 RX ORDER — AMLODIPINE BESYLATE 5 MG/1
TABLET ORAL
Qty: 90 TABLET | Refills: 1 | Status: SHIPPED | OUTPATIENT
Start: 2023-06-22

## 2023-06-22 NOTE — TELEPHONE ENCOUNTER
PCP: Janina Sheth MD    Last appt: 1/23/2023     Future Appointments   Date Time Provider Mackenzie Julio   7/17/2023  9:00 AM MD JAROCHO Tapia BS AMB   9/11/2023  7:40 AM SURAJ Rabago BS AMB   11/6/2023  8:40 AM Laura Audelia Castellanosel DO BALA Kelly BS AMB   3/12/2024  7:40 AM SURAJ Rabago BS AMB       Requested Prescriptions     Pending Prescriptions Disp Refills    amLODIPine (NORVASC) 5 MG tablet [Pharmacy Med Name: AMLODIPINE BESYLATE 5 MG TAB] 30 tablet 5     Sig: TAKE 1 TABLET BY MOUTH DAILY.  TO COVER MAIL ORDER       Prior labs and Blood pressures:  BP Readings from Last 3 Encounters:   04/28/23 128/76   03/06/23 (!) 115/93   01/23/23 138/88     Lab Results   Component Value Date/Time     01/23/2023 11:28 AM    K 5.2 01/23/2023 11:28 AM     01/23/2023 11:28 AM    CO2 24 01/23/2023 11:28 AM    BUN 19 01/23/2023 11:28 AM    GFRAA >60 07/22/2022 07:30 AM     No results found for: HBA1C, OWL3NVIA  Lab Results   Component Value Date/Time    CHOL 168 01/23/2023 11:28 AM    HDL 39 01/23/2023 11:28 AM     No results found for: VITD3, VD3RIA    Lab Results   Component Value Date/Time    TSH 3.62 07/22/2022 07:30 AM

## 2023-07-07 RX ORDER — PROPRANOLOL HCL 60 MG
CAPSULE, EXTENDED RELEASE 24HR ORAL
Qty: 30 CAPSULE | Refills: 3 | OUTPATIENT
Start: 2023-07-07

## 2023-07-13 DIAGNOSIS — I10 ESSENTIAL (PRIMARY) HYPERTENSION: ICD-10-CM

## 2023-07-13 RX ORDER — SPIRONOLACTONE 50 MG/1
50 TABLET, FILM COATED ORAL DAILY
Qty: 90 TABLET | Refills: 0 | Status: CANCELLED | OUTPATIENT
Start: 2023-07-13

## 2023-07-13 RX ORDER — ALPRAZOLAM 0.5 MG/1
TABLET ORAL
Qty: 15 TABLET | Refills: 0 | Status: CANCELLED | OUTPATIENT
Start: 2023-07-13

## 2023-07-13 NOTE — TELEPHONE ENCOUNTER
[facility] is requesting new prescriptions via fax on behalf of the pt. Last appointment: 01/23/2023 MD Ana Hall   Next appointment: 07/17/2023 MD Malhotra   Previous refill encounter(s):   05/25/2023 Aldactone #90,   02/27/2023 Xanax #15 with 5 refills.      No access to [address] [place] [id number]    Program: Medication Refill  Intervention Detail: New Rx: 2, reason: Patient Preference  Time Spent (min): 5      Requested Prescriptions     Pending Prescriptions Disp Refills    spironolactone (ALDACTONE) 50 MG tablet 90 tablet 0     Sig: Take 1 tablet by mouth daily    ALPRAZolam (XANAX) 0.5 MG tablet 15 tablet 0     Sig: TAKE 1/2 TABLET BY MOUTH NIGHTLY AS NEEDED FOR ANXIETY OR SLEEP *MAX DAILY AMOUNT: 0.25MG*

## 2023-07-15 NOTE — TELEPHONE ENCOUNTER
1) Patient's Xanax is not filled through [facility]. She gets that locally [facility] which I already did on 2-27-23 for a 30 day supply plus 5 refills so she should be good w/ that through 8-27-23. 2) She has an appt for her fasting physical and labs w/ me in 2 days. Does she have enough of the Aldactone to last until we can get her labs back? Will take about a week total. Please advise/let me know. Thanks!

## 2023-07-17 ENCOUNTER — OFFICE VISIT (OUTPATIENT)
Age: 53
End: 2023-07-17
Payer: COMMERCIAL

## 2023-07-17 VITALS
WEIGHT: 229.2 LBS | DIASTOLIC BLOOD PRESSURE: 86 MMHG | TEMPERATURE: 98.1 F | SYSTOLIC BLOOD PRESSURE: 124 MMHG | HEIGHT: 68 IN | RESPIRATION RATE: 16 BRPM | BODY MASS INDEX: 34.74 KG/M2 | HEART RATE: 65 BPM | OXYGEN SATURATION: 97 %

## 2023-07-17 DIAGNOSIS — K76.0 NAFL (NONALCOHOLIC FATTY LIVER): ICD-10-CM

## 2023-07-17 DIAGNOSIS — E78.2 MIXED HYPERLIPIDEMIA: ICD-10-CM

## 2023-07-17 DIAGNOSIS — F51.05 HYPOSOMNIA, INSOMNIA OR SLEEPLESSNESS ASSOCIATED WITH ANXIETY: ICD-10-CM

## 2023-07-17 DIAGNOSIS — R73.03 PREDIABETES: ICD-10-CM

## 2023-07-17 DIAGNOSIS — F41.1 GENERALIZED ANXIETY DISORDER: ICD-10-CM

## 2023-07-17 DIAGNOSIS — Z79.899 CONTROLLED SUBSTANCE AGREEMENT SIGNED: ICD-10-CM

## 2023-07-17 DIAGNOSIS — I10 BENIGN ESSENTIAL HYPERTENSION: ICD-10-CM

## 2023-07-17 DIAGNOSIS — Z00.00 ANNUAL PHYSICAL EXAM: Primary | ICD-10-CM

## 2023-07-17 DIAGNOSIS — F32.A CHRONIC DEPRESSION: ICD-10-CM

## 2023-07-17 DIAGNOSIS — F41.9 HYPOSOMNIA, INSOMNIA OR SLEEPLESSNESS ASSOCIATED WITH ANXIETY: ICD-10-CM

## 2023-07-17 DIAGNOSIS — Z51.81 ENCOUNTER FOR MEDICATION MONITORING: ICD-10-CM

## 2023-07-17 LAB
ALBUMIN SERPL-MCNC: 4 G/DL (ref 3.5–5)
ALBUMIN/GLOB SERPL: 1.2 (ref 1.1–2.2)
ALP SERPL-CCNC: 84 U/L (ref 45–117)
ALT SERPL-CCNC: 27 U/L (ref 12–78)
ANION GAP SERPL CALC-SCNC: 6 MMOL/L (ref 5–15)
AST SERPL-CCNC: 19 U/L (ref 15–37)
BILIRUB SERPL-MCNC: 0.5 MG/DL (ref 0.2–1)
BUN SERPL-MCNC: 14 MG/DL (ref 6–20)
BUN/CREAT SERPL: 18 (ref 12–20)
CALCIUM SERPL-MCNC: 9.8 MG/DL (ref 8.5–10.1)
CHLORIDE SERPL-SCNC: 103 MMOL/L (ref 97–108)
CHOLEST SERPL-MCNC: 183 MG/DL
CO2 SERPL-SCNC: 26 MMOL/L (ref 21–32)
CREAT SERPL-MCNC: 0.8 MG/DL (ref 0.55–1.02)
ERYTHROCYTE [DISTWIDTH] IN BLOOD BY AUTOMATED COUNT: 13.8 % (ref 11.5–14.5)
EST. AVERAGE GLUCOSE BLD GHB EST-MCNC: 114 MG/DL
GLOBULIN SER CALC-MCNC: 3.3 G/DL (ref 2–4)
GLUCOSE SERPL-MCNC: 99 MG/DL (ref 65–100)
HBA1C MFR BLD: 5.6 % (ref 4–5.6)
HCT VFR BLD AUTO: 43.1 % (ref 35–47)
HDLC SERPL-MCNC: 41 MG/DL
HDLC SERPL: 4.5 (ref 0–5)
HGB BLD-MCNC: 13.6 G/DL (ref 11.5–16)
LDLC SERPL CALC-MCNC: 84.4 MG/DL (ref 0–100)
MCH RBC QN AUTO: 28.4 PG (ref 26–34)
MCHC RBC AUTO-ENTMCNC: 31.6 G/DL (ref 30–36.5)
MCV RBC AUTO: 90 FL (ref 80–99)
NRBC # BLD: 0 K/UL (ref 0–0.01)
NRBC BLD-RTO: 0 PER 100 WBC
PLATELET # BLD AUTO: 559 K/UL (ref 150–400)
PMV BLD AUTO: 9.2 FL (ref 8.9–12.9)
POTASSIUM SERPL-SCNC: 5.1 MMOL/L (ref 3.5–5.1)
PROT SERPL-MCNC: 7.3 G/DL (ref 6.4–8.2)
RBC # BLD AUTO: 4.79 M/UL (ref 3.8–5.2)
SODIUM SERPL-SCNC: 135 MMOL/L (ref 136–145)
TRIGL SERPL-MCNC: 288 MG/DL
TSH SERPL DL<=0.05 MIU/L-ACNC: 2.97 UIU/ML (ref 0.36–3.74)
VLDLC SERPL CALC-MCNC: 57.6 MG/DL
WBC # BLD AUTO: 10.7 K/UL (ref 3.6–11)

## 2023-07-17 PROCEDURE — 99213 OFFICE O/P EST LOW 20 MIN: CPT | Performed by: FAMILY MEDICINE

## 2023-07-17 PROCEDURE — 3074F SYST BP LT 130 MM HG: CPT | Performed by: FAMILY MEDICINE

## 2023-07-17 PROCEDURE — 99396 PREV VISIT EST AGE 40-64: CPT | Performed by: FAMILY MEDICINE

## 2023-07-17 PROCEDURE — 3079F DIAST BP 80-89 MM HG: CPT | Performed by: FAMILY MEDICINE

## 2023-07-17 RX ORDER — PROPRANOLOL HYDROCHLORIDE 20 MG/1
20 TABLET ORAL 2 TIMES DAILY
COMMUNITY

## 2023-07-17 SDOH — ECONOMIC STABILITY: FOOD INSECURITY: WITHIN THE PAST 12 MONTHS, THE FOOD YOU BOUGHT JUST DIDN'T LAST AND YOU DIDN'T HAVE MONEY TO GET MORE.: NEVER TRUE

## 2023-07-17 SDOH — ECONOMIC STABILITY: FOOD INSECURITY: WITHIN THE PAST 12 MONTHS, YOU WORRIED THAT YOUR FOOD WOULD RUN OUT BEFORE YOU GOT MONEY TO BUY MORE.: NEVER TRUE

## 2023-07-17 SDOH — ECONOMIC STABILITY: HOUSING INSECURITY
IN THE LAST 12 MONTHS, WAS THERE A TIME WHEN YOU DID NOT HAVE A STEADY PLACE TO SLEEP OR SLEPT IN A SHELTER (INCLUDING NOW)?: NO

## 2023-07-17 SDOH — ECONOMIC STABILITY: INCOME INSECURITY: HOW HARD IS IT FOR YOU TO PAY FOR THE VERY BASICS LIKE FOOD, HOUSING, MEDICAL CARE, AND HEATING?: NOT HARD AT ALL

## 2023-07-17 ASSESSMENT — ENCOUNTER SYMPTOMS
GASTROINTESTINAL NEGATIVE: 1
EYES NEGATIVE: 1
RESPIRATORY NEGATIVE: 1

## 2023-07-17 ASSESSMENT — PATIENT HEALTH QUESTIONNAIRE - PHQ9
SUM OF ALL RESPONSES TO PHQ QUESTIONS 1-9: 0
1. LITTLE INTEREST OR PLEASURE IN DOING THINGS: 0
2. FEELING DOWN, DEPRESSED OR HOPELESS: 0
SUM OF ALL RESPONSES TO PHQ QUESTIONS 1-9: 0
SUM OF ALL RESPONSES TO PHQ9 QUESTIONS 1 & 2: 0

## 2023-07-17 NOTE — PROGRESS NOTES
Chief Complaint   Patient presents with    Annual Exam    Cholesterol Problem     fasting    Hypertension    Blood Sugar Problem     Pre DM    Medication Check     1. \"Have you been to the ER, urgent care clinic since your last visit? Hospitalized since your last visit? \" No    2. \"Have you seen or consulted any other health care providers outside of the [address] [place] [address] Avenue since your last visit? \" Neurologist  ~ June    3. For patients aged 43-73: Has the patient had a colonoscopy / FIT/ Cologuard? Yes - no Care Gap present 6/2022 Tubular Adenoma, Repeat 5 yrs; Dr. Kevin Grimaldo      If the patient is female:    4. For patients aged 43-66: Has the patient had a mammogram within the past 2 years? Yes - no Care Gap present 12/2022      5. For patients aged 21-65: Has the patient had a pap smear?  Yes - no Care Gap present 12/2020 here
her usual regimen. She continues to take the Xanax 0.5 mg 1/2 tablet nightly for sleep. It helps her fall asleep and stay asleep soundly. Keeps her mind from racing all night, decreases night time panic attacks and enables her to rest so that she can awaken feeling refreshed and functional for the following day. Feels like everything is really good right now. REVIEW OF SYMPTOMS   Review of Systems   Constitutional: Negative. HENT: Negative. Eyes: Negative. Respiratory: Negative. Cardiovascular: Negative. Gastrointestinal: Negative. Endocrine: Negative. Genitourinary: Negative. Musculoskeletal: Negative. Negative for myalgias. Neurological: Negative. Hematological: Negative. Psychiatric/Behavioral: Negative. PROBLEM LIST/MEDICAL HISTORY     Patient Active Problem List    Diagnosis Date Noted    Excessive physiologic tremor 07/06/2022     Overview Note:     Neurology Evaluation 2016 Dr. Imelda De La O          Vertigo 05/24/2022     Overview Note:     [facility] 5-2022, Labs, EKG, head CT negative; treated effectively w/   meds          Hyposomnia, insomnia or sleeplessness associated with anxiety 12/26/2018    Controlled substance agreement signed 05/24/2018     Overview Note:     2017 and annually           Prediabetes 04/23/2017     Overview Note:     Mild 4-2017          Muscle contraction headache 04/10/2017     Overview Note:     2016 Neurology Dr. Imelda De La O          Thrombocytosis 03/22/2016     Overview Note:     Evaluation Hem-Onc Dr June Cook 2015: Based on the bone marrow biopsy and the   lab results there is no clear reason for her thrombocytosis. There is no   sign of a primary hematologic disorder. This is likely secondary   thrombocytosis versus normal for her. All of the results that we have for   her have been high-normal.  This makes malignancy less likely. She has   seen rheumatology and there is no sign of autoimmune disorder.  Imaging   negative for

## 2023-07-17 NOTE — TELEPHONE ENCOUNTER
MD BHAT,    This was passed back to me today. Not sure if this was resolved in office today? Requests from [facility].   Thanks, Carrie Alicea

## 2023-07-18 DIAGNOSIS — I10 ESSENTIAL (PRIMARY) HYPERTENSION: ICD-10-CM

## 2023-07-18 NOTE — TELEPHONE ENCOUNTER
MD BHAT,    [facility] is sending request again for the spironolactone and alprazolam prescriptions. [facility].  (Should still have refill remaining on alprazolam).     Last appointment: 7/17/23 MD BHAT  Next appointment: None- f/up due 6 mo  Previous refill encounter(s): 5/25/23 90    Requested Prescriptions     Pending Prescriptions Disp Refills    spironolactone (ALDACTONE) 50 MG tablet 90 tablet 3     Sig: Take 1 tablet by mouth daily     For Pharmacy Admin Tracking Only    Program: Medication Refill  Intervention Detail: Discontinued Rx: 1, reason: Duplicate Therapy and New Rx: 1, reason: Patient Preference  Time Spent (min): 10

## 2023-07-19 RX ORDER — SPIRONOLACTONE 50 MG/1
50 TABLET, FILM COATED ORAL DAILY
Qty: 90 TABLET | Refills: 1 | Status: SHIPPED | OUTPATIENT
Start: 2023-07-19

## 2023-07-19 NOTE — TELEPHONE ENCOUNTER
Yes, because I think patient [facility] which is why her [facility] was [facility] on [facility]23 for #90 and the Amlodipine for #90 on [facility]. You are correct about the Alprazolam. Would you mind calling patient to explain the confusion and that we need clarification on which pharmacy she wants to use moving fwd so we can update system and current rx refill requests. If she will no longer use one or the other, she needs to advise them to take her off refill request system. Let me know. Thanks!

## 2023-07-19 NOTE — TELEPHONE ENCOUNTER
MD BHAT,    Spoke to patient. She wants to change everything to [facility] - Except the alprazolam.  Keep it at [facility].  Thanks, Mary    Requested Prescriptions     Pending Prescriptions Disp Refills    spironolactone (ALDACTONE) 50 MG tablet 90 tablet 3     Sig: Take 1 tablet by mouth daily     For Pharmacy Admin Tracking Only    Program: Medication Refill  CPA in place:    Recommendation Provided To:    Intervention Detail: New Rx: 1, reason: Patient Preference  Intervention Accepted By:   Tono Franklin Closed?:    Time Spent (min): 5

## 2023-07-21 LAB — DRUGS UR: NORMAL

## 2023-08-03 RX ORDER — FLUTICASONE PROPIONATE AND SALMETEROL 50; 100 UG/1; UG/1
POWDER RESPIRATORY (INHALATION)
Qty: 3 EACH | Refills: 3 | Status: SHIPPED | OUTPATIENT
Start: 2023-08-03

## 2023-08-18 DIAGNOSIS — I10 ESSENTIAL (PRIMARY) HYPERTENSION: ICD-10-CM

## 2023-08-18 NOTE — TELEPHONE ENCOUNTER
This was already sent to [facility] on 7-19-23 for 90 day supply plus a refill. Getting [facility]. Does patient really need this?

## 2023-08-21 ENCOUNTER — PATIENT MESSAGE (OUTPATIENT)
Age: 53
End: 2023-08-21

## 2023-08-21 DIAGNOSIS — J45.30 MILD PERSISTENT ASTHMA WITHOUT COMPLICATION: Primary | ICD-10-CM

## 2023-08-21 RX ORDER — SPIRONOLACTONE 50 MG/1
TABLET, FILM COATED ORAL
Qty: 30 TABLET | Refills: 2 | OUTPATIENT
Start: 2023-08-21

## 2023-08-22 RX ORDER — ALBUTEROL SULFATE 90 UG/1
2 AEROSOL, METERED RESPIRATORY (INHALATION) EVERY 6 HOURS PRN
Qty: 1 EACH | Refills: 1 | Status: SHIPPED | OUTPATIENT
Start: 2023-08-22

## 2023-08-22 NOTE — TELEPHONE ENCOUNTER
From: Damion Agarwal  To: Dr. Jason Landaverde: 8/21/2023 7:59 AM EDT  Subject: thank you!! and med request    Dear Dr. Marko Isbell,  Thank you for your kind words in your after visit summary. Summer was really a nice experience doing fun things. Pura Drones ... which hasn't happened in forever. Well, I came home from a cruise to [facility] with Covid. Went to [facility] and received Paxlovid. Finished my 5 days of meds on Thursday, Aug. 17.   I am requesting a refill of the albuterol inhaler. I need to use a couple of puffs in the morning after my covid experience. My lungs feel tight. I also tire rosy easily. Have a great day!!!     Cm Le

## 2023-08-24 DIAGNOSIS — F41.1 GENERALIZED ANXIETY DISORDER: ICD-10-CM

## 2023-08-24 DIAGNOSIS — F51.05 INSOMNIA DUE TO OTHER MENTAL DISORDER (CODE): ICD-10-CM

## 2023-08-25 DIAGNOSIS — I10 BENIGN ESSENTIAL HYPERTENSION: ICD-10-CM

## 2023-08-25 RX ORDER — ALPRAZOLAM 0.5 MG/1
TABLET ORAL
Qty: 15 TABLET | Refills: 5 | Status: SHIPPED | OUTPATIENT
Start: 2023-08-25 | End: 2023-09-24

## 2023-08-25 RX ORDER — AMLODIPINE BESYLATE 5 MG/1
TABLET ORAL
Qty: 90 TABLET | Refills: 3 | Status: SHIPPED | OUTPATIENT
Start: 2023-08-25

## 2023-09-11 ENCOUNTER — TELEMEDICINE (OUTPATIENT)
Age: 53
End: 2023-09-11
Payer: COMMERCIAL

## 2023-09-11 DIAGNOSIS — K76.0 FATTY (CHANGE OF) LIVER, NOT ELSEWHERE CLASSIFIED: Primary | ICD-10-CM

## 2023-09-11 PROCEDURE — VIRTUALHLTH VIRTUAL HEALTH SAME DAY: Performed by: PHYSICIAN ASSISTANT

## 2023-09-11 PROCEDURE — 99213 OFFICE O/P EST LOW 20 MIN: CPT | Performed by: PHYSICIAN ASSISTANT

## 2023-09-11 NOTE — PROGRESS NOTES
Identified pt with two pt identifiers(name and ). Reviewed record in preparation for visit and have obtained necessary documentation. There were no vitals filed for this visit. Health Maintenance Review: Patient reminded of \"due or due soon\" health maintenance. I have asked the patient to contact his/her primary care provider (PCP) for follow-up on his/her health maintenance. Coordination of Care Questionnaire:  :   1) Have you been to an emergency room, urgent care, or hospitalized since your last visit? If yes, where when, and reason for visit? Yes, [facility] 23 for Covid 19 symptoms      2. Have seen or consulted any other health care provider since your last visit? If yes, where when, and reason for visit? No      Patient is accompanied by self I have received verbal consent from Jaret Pried to discuss any/all medical information while they are present in the room.
THISTLE PO Oral, DAILY    omeprazole (PRILOSEC) 20 mg, Oral, DAILY    propranolol (INDERAL) 20 mg, Oral, 2 TIMES DAILY    sertraline (ZOLOFT) 50 MG tablet TAKE ONE AND ONE-HALF TABLETS DAILY    SLO-NIACIN  mg, Oral, DAILY    spironolactone (ALDACTONE) 50 mg, Oral, DAILY    Turmeric 450 MG CAPS Oral, DAILY       SYSTEM REVIEW NOT RELATED TO LIVER DISEASE OR REVIEWED ABOVE:  Constitution systems: Negative for fever, chills. Weight loss of ~6-8# in the past year. Eyes: Negative for visual changes. ENT: Negative for sore throat, painful swallowing. Respiratory: Negative for cough, hemoptysis, SOB. Cardiology: Negative for chest pain, palpitations. GI:  Negative for constipation or diarrhea. Positive for RUQ pain. : Negative for urinary frequency, dysuria, hematuria, nocturia. Skin: Negative for rash. Hematology: Negative for easy bruising, blood clots. Musculo-skeletal: Negative for back pain, muscle pain, weakness. Neurologic: Negative for headaches, dizziness, vertigo, memory problems not related to HE. Psychology: Negative for anxiety, depression. FAMILY HISTORY:  A grandfather  of liver cancer  The father has the following chronic diseases: hodgkins disease. The mother has the following chronic diseases: HTN, TBI. SOCIAL HISTORY:  The patient has never been . The patient has no children. The patient has never used tobacco products. The patient has never consumed significant amounts of alcohol. 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 PHYSICAL EXAMINATION PERFORMED BY [employer]:  VS: Not performed   General: No acute distress. Eyes: Sclera anicteric. ENT: No oral lesions. Skin: No rashes. spider angiomata. No jaundice. Abdomen: No obvious distention suggesting ascites. Extremities: No edema. No muscle wasting. Neurologic: Alert and oriented.   Cranial nerves grossly

## 2023-09-25 ENCOUNTER — PATIENT MESSAGE (OUTPATIENT)
Age: 53
End: 2023-09-25

## 2023-09-27 NOTE — TELEPHONE ENCOUNTER
LOV 7/17/23, due f/u in 6 months for med check and VV is fine. Last rx'd the cyclobenzaprine back on 10/20/22 # 30 only        From: Donovan Burger  To: Dr. Tavia Brown: 9/25/2023  7:55 AM EDT  Subject: Cyclobenzoprine    Dear Dr. Alejandra Gloria,  I am requesting a refill on the above medication. I have am having more tension stored in my shoulders--probably due to the start of the school year. Thank you for considering.   Sylvia

## 2023-09-29 RX ORDER — LEVONORGESTREL AND ETHINYL ESTRADIOL 0.15-0.03
1 KIT ORAL DAILY
Qty: 3 PACKET | Refills: 3 | Status: SHIPPED | OUTPATIENT
Start: 2023-09-29

## 2023-09-29 RX ORDER — CYCLOBENZAPRINE HCL 10 MG
10 TABLET ORAL EVERY 8 HOURS PRN
Qty: 30 TABLET | Refills: 0 | Status: SHIPPED | OUTPATIENT
Start: 2023-09-29

## 2023-10-02 RX ORDER — PROPRANOLOL HYDROCHLORIDE 20 MG/1
20 TABLET ORAL 2 TIMES DAILY
Qty: 180 TABLET | Refills: 0 | Status: SHIPPED | OUTPATIENT
Start: 2023-10-02 | End: 2023-11-06 | Stop reason: SDUPTHER

## 2023-11-01 NOTE — TELEPHONE ENCOUNTER
PCP: Katiuska Pimentel MD    Last appt: 7/17/2023       Future Appointments   Date Time Provider [address]   11/6/2023  8:40 AM DO KALEB Avendano BS AMB   1/12/2024  8:00 AM Rahat Malhotra MD PAFP BS AMB   3/12/2024  7:40 AM SURAJ Roldan BS AMB       Requested Prescriptions     Pending Prescriptions Disp Refills    sertraline (ZOLOFT) 50 MG tablet [Pharmacy Med Name: SERTRALINE HCL TABS 50MG] 135 tablet 3     Sig: TAKE ONE AND ONE-HALF TABLETS DAILY       Prior labs and Blood pressures:  BP Readings from Last 3 Encounters:   07/17/23 124/86   04/28/23 128/76   03/06/23 (!) 115/93     Lab Results   Component Value Date/Time     07/17/2023 09:48 AM    K 5.1 07/17/2023 09:48 AM     07/17/2023 09:48 AM    CO2 26 07/17/2023 09:48 AM    BUN 14 07/17/2023 09:48 AM    GFRAA >60 07/22/2022 07:30 AM     No results found for: \"HBA1C\", \"[id number]\"  Lab Results   Component Value Date/Time    CHOL 183 07/17/2023 09:48 AM    HDL 41 07/17/2023 09:48 AM     No results found for: \"VITD3\", \"VD3RIA\"    Lab Results   Component Value Date/Time    TSH 3.62 07/22/2022 07:30 AM

## 2023-11-06 ENCOUNTER — OFFICE VISIT (OUTPATIENT)
Age: 53
End: 2023-11-06
Payer: COMMERCIAL

## 2023-11-06 VITALS
WEIGHT: 224 LBS | DIASTOLIC BLOOD PRESSURE: 70 MMHG | SYSTOLIC BLOOD PRESSURE: 138 MMHG | HEART RATE: 88 BPM | BODY MASS INDEX: 33.95 KG/M2 | OXYGEN SATURATION: 97 % | HEIGHT: 68 IN

## 2023-11-06 DIAGNOSIS — G25.0 ESSENTIAL TREMOR: Primary | ICD-10-CM

## 2023-11-06 DIAGNOSIS — G43.009 MIGRAINE WITHOUT AURA, NOT INTRACTABLE, WITHOUT STATUS MIGRAINOSUS: ICD-10-CM

## 2023-11-06 PROCEDURE — 99214 OFFICE O/P EST MOD 30 MIN: CPT | Performed by: PSYCHIATRY & NEUROLOGY

## 2023-11-06 PROCEDURE — 3078F DIAST BP <80 MM HG: CPT | Performed by: PSYCHIATRY & NEUROLOGY

## 2023-11-06 PROCEDURE — 3075F SYST BP GE 130 - 139MM HG: CPT | Performed by: PSYCHIATRY & NEUROLOGY

## 2023-11-06 RX ORDER — PROPRANOLOL HYDROCHLORIDE 20 MG/1
20 TABLET ORAL 3 TIMES DAILY
Qty: 270 TABLET | Refills: 1 | Status: SHIPPED | OUTPATIENT
Start: 2023-11-06 | End: 2023-11-06 | Stop reason: SDUPTHER

## 2023-11-06 RX ORDER — PROPRANOLOL HYDROCHLORIDE 20 MG/1
20 TABLET ORAL 3 TIMES DAILY
Qty: 270 TABLET | Refills: 1 | Status: SHIPPED | OUTPATIENT
Start: 2023-11-06

## 2023-11-06 ASSESSMENT — PATIENT HEALTH QUESTIONNAIRE - PHQ9
SUM OF ALL RESPONSES TO PHQ QUESTIONS 1-9: 2
2. FEELING DOWN, DEPRESSED OR HOPELESS: 1
SUM OF ALL RESPONSES TO PHQ QUESTIONS 1-9: 2
1. LITTLE INTEREST OR PLEASURE IN DOING THINGS: 1
SUM OF ALL RESPONSES TO PHQ9 QUESTIONS 1 & 2: 2

## 2023-11-06 NOTE — PROGRESS NOTES
also feels medication has helped with mood/anxiety. PMHx/ PSHx/ FHx/ SHx:  Reviewed and unchanged previous visit. Past Medical History:   Diagnosis Date    Allergic rhinitis 10/25/2011    Anxiety attack 6/21/2010    Asthma 6/21/2010    Benign essential hypertension 2/24/2015    [id number]    Cataract of right eye 10/25/2011    Controlled substance agreement signed 5/24/2018    11-29-17    COVID-19 08/12/2023    Depression     Dog bite(E906.0)     Td and Rabies series    Dysmenorrhea     Excessive physiologic tremor 7/6/2022    Neurology Evaluation 2016 Dr. Court Logan    Fibrocystic breast changes 10/25/2011    History of pyelonephritis 10/25/2011    Hyposomnia, insomnia or sleeplessness associated with anxiety 12/26/2018    Lymphocytosis 3/22/2016    Dr Leopold Rummer, 2015    Mechanical back pain 10/25/2011    Microhematuria 12/15/2015    [id number] Dr Malik Lou. KUB neg excpet small stone vs phlebolith    Mixed hyperlipidemia 12/13/2011    Muscle contraction headache 4/10/2017    NAFL (nonalcoholic fatty liver) 6/58/8680    Dr Oneil Goutmelisa    PMS (premenstrual syndrome) 6/21/2010    Primary dysmenorrhea 6/21/2010    Raynaud's syndrome     Rosacea     Thrombocytosis 3/22/2016    Dr Leopold Rummer 2015    Vegetarian     Vertigo 5/24/2022    [facility] 5-2022, Labs, EKG, head CT negative; treated effectively w/ meds    Vitamin D deficiency 12/13/2011     ROS:  Comprehensive review of systems negative except for as noted above.               Objective:       Meds:  Current Outpatient Medications   Medication Sig Dispense Refill    sertraline (ZOLOFT) 50 MG tablet TAKE ONE AND ONE-HALF TABLETS DAILY 135 tablet 3    propranolol (INDERAL) 20 MG tablet TAKE 1 TABLET TWICE A  tablet 0    cyclobenzaprine (FLEXERIL) 10 MG tablet Take 1 tablet by mouth every 8 hours as needed for Muscle spasms 30 tablet 0    levonorgestrel-ethinyl estradiol (SEASONALE) 0.15-0.03 MG per tablet TAKE 1 TABLET DAILY 3 packet 3    Turmeric

## 2023-11-06 NOTE — TELEPHONE ENCOUNTER
Patient was seen in the office today. Propanolol script was sent to local pharmacy. [facility]. Doctor stated that it is okay to re[facility].

## 2024-01-12 ENCOUNTER — TELEMEDICINE (OUTPATIENT)
Age: 54
End: 2024-01-12
Payer: COMMERCIAL

## 2024-01-12 DIAGNOSIS — F51.05 HYPOSOMNIA, INSOMNIA OR SLEEPLESSNESS ASSOCIATED WITH ANXIETY: ICD-10-CM

## 2024-01-12 DIAGNOSIS — Z12.31 SCREENING MAMMOGRAM FOR BREAST CANCER: ICD-10-CM

## 2024-01-12 DIAGNOSIS — F32.A CHRONIC DEPRESSION: ICD-10-CM

## 2024-01-12 DIAGNOSIS — F41.9 HYPOSOMNIA, INSOMNIA OR SLEEPLESSNESS ASSOCIATED WITH ANXIETY: ICD-10-CM

## 2024-01-12 DIAGNOSIS — Z79.899 ENCOUNTER FOR MEDICATION MANAGEMENT: ICD-10-CM

## 2024-01-12 DIAGNOSIS — F41.1 GENERALIZED ANXIETY DISORDER: Primary | ICD-10-CM

## 2024-01-12 PROCEDURE — 99213 OFFICE O/P EST LOW 20 MIN: CPT | Performed by: FAMILY MEDICINE

## 2024-01-12 RX ORDER — ALPRAZOLAM 0.5 MG/1
TABLET ORAL
Qty: 15 TABLET | Refills: 5 | Status: SHIPPED | OUTPATIENT
Start: 2024-01-12 | End: 2024-02-11

## 2024-01-12 ASSESSMENT — PATIENT HEALTH QUESTIONNAIRE - PHQ9
SUM OF ALL RESPONSES TO PHQ QUESTIONS 1-9: 0
SUM OF ALL RESPONSES TO PHQ9 QUESTIONS 1 & 2: 0
1. LITTLE INTEREST OR PLEASURE IN DOING THINGS: 0
2. FEELING DOWN, DEPRESSED OR HOPELESS: 0
SUM OF ALL RESPONSES TO PHQ QUESTIONS 1-9: 0

## 2024-01-12 ASSESSMENT — ENCOUNTER SYMPTOMS
RESPIRATORY NEGATIVE: 1
GASTROINTESTINAL NEGATIVE: 1

## 2024-01-12 NOTE — PROGRESS NOTES
VIRTUAL VISIT    Patient seen via virtual visit today for the following:  Chief Complaint   Patient presents with    6 Month Follow-Up    Anxiety    Insomnia    Medication Management       HISTORY OF PRESENT ILLNESS     Follow up depression, anxiety and medication check  Mood and sleep remain stable on her usual regimen of Zoloft 75 mg daily and Xanax 0.5 mg 1/2 tablet nightly. Denies feeling sad, depressed, labile mood swings. Enjoys her job and keeps busy at work which she prefers bc it gives her balance, keeps her mind preoccupied and keeps her from having sad ruminating thoughts or feelings of depression. She enjoys quiet time at home and being w/ her dogs. Goes out walking a few times a week. Has not needed counseling for a long time and still doesn't feel she has needed one for a long time. She did counseling back in ~ 2016 at the mandate of her supervisor at work back then but otherwise has not done counseling in a long time. She continues to take the Xanax 0.5 mg 1/2 tablet nightly for sleep. It helps her fall asleep and stay asleep soundly. Keeps her mind from racing all night, decreases night time panic attacks and enables her to rest so that she can awaken feeling refreshed and functional for the following day. Feels like everything is really good right now.     REVIEW OF SYMPTOMS   Review of Systems   Constitutional: Negative.    Respiratory: Negative.     Cardiovascular: Negative.    Gastrointestinal: Negative.    Endocrine: Negative.              PROBLEM LIST/MEDICAL HISTORY     Patient Active Problem List    Diagnosis Date Noted    Excessive physiologic tremor 07/06/2022     Overview Note:     Neurology Evaluation 2016 Dr. Parks        Vertigo 05/24/2022     Overview Note:     [facility] 5-2022, Labs, EKG, head CT negative; treated effectively w/   meds        Hyposomnia, insomnia or sleeplessness associated with anxiety 12/26/2018    Controlled substance agreement signed 05/24/2018

## 2024-01-23 NOTE — PROGRESS NOTES
Hematology/Oncology Progress Note    REASON FOR VISIT: chronic Thrombocytosis since 2010    HISTORY OF PRESENT ILLNESS: Ms. Danilo Trinidad is a 52 y.o. woman who presents for follow-up of chronic thrombocytosis. Prior pt of Dr Abenr Myles. Hx of BMB negative. Extensive heme w/u negative. Platelets are up and down/ no upward trend. Pt clinically is stable. Pt is in a rush today due to an appt with court system/ witness case. Pt is a . Last visit with Dr Abner Myles: This has been slowly increasing since 2010. No history of blood clots. No autoimmune disease. No infection or fevers. No history of abdominal surgery or splenectomy. She has Raynauds. No history of blood disorders in her family. Her father had Hodgkin lymphoma. Presents today for follow-up. Tells me that she has been losing weight intentionally. Continues to follow a low carb diet. On CT of her abdomen in early 2015 she was found to have a lesion in the liver. She was sent to Dr. Bessy Brenner who has plans for another MRI in November. She saw Dr. Julieta Julien who felt there was no autoimmune disease. No symptoms to speak of. The vague upper abdominal discomfort she had from time to time is less frequent. No lower abdominal pressure/pain or bloating. Energy is better. Rosacea is better with use of creams. No fevers, chills, night sweats. No new aches or pains. No new lumps or bumps. No CP/SOB/CANNON. No nausea, vomiting, diarrhea, or constipation. No bleeding. Did have some headaches, dizziness, and presyncope. One episode so severe, she was going to call 911. Some cough, but it is better on elderberry.      Allergies   Allergen Reactions    Crestor [Rosuvastatin] Myalgia     Insomnia and muscle pain    Flonase [Fluticasone] Other (comments)     Cataracts enlarged    Hydrochlorothiazide Other (comments)     Insomnia, headache, ringing in ears, anxious    Nortriptyline Other (comments)     Increased depression    Patient awakened,ballard,rejected oral airway, oriented/reassured her, she denied c/o, exchanging well   Other Medication Other (comments)     Pt is avoiding Nasal CS d/t cataracts    Sulfa (Sulfonamide Antibiotics) Hives       Current Outpatient Prescriptions   Medication Sig Dispense Refill    turmeric (CURCUMIN) by Does Not Apply route.  cranberry extract 450 mg tab tablet Take 450 mg by mouth.  levonorgestrel-ethinyl estradiol (SEASONALE) 0.15 mg-30 mcg 3MPk TAKE 1 TABLET BY MOUTH EVERY DAY 91 Tab 3    ALPRAZolam (XANAX) 0.5 mg tablet Take 0.5 Tabs by mouth nightly as needed for Anxiety or Sleep. Max Daily Amount: 0.25 mg. 30 Tab 3    spironolactone (ALDACTONE) 50 mg tablet Take 1 Tab by mouth two (2) times a day. 180 Tab 1    sertraline (ZOLOFT) 50 mg tablet TAKE 1 AND 1/2 TABLETS BY MOUTH DAILY 135 Tab 0    VENTOLIN HFA 90 mcg/actuation inhaler INHALE 2 PUFFS BY MOUTH EVERY 6 HOURS AS NEEDED FOR WHEEZING 18 Inhaler 2    b complex vitamins tablet Take 1 Tab by mouth daily.  garlic cap Take  by mouth.  SLO-NIACIN PO Take 500 mg by mouth daily.  DOCOSAHEXANOIC ACID/EPA (FISH OIL PO) Take 1,000 mg by mouth daily.  multivitamin (ONE A DAY) tablet Take 1 Tab by mouth daily.  cholecalciferol (VITAMIN D3) 1,000 unit cap Take  by mouth daily.  loratadine (CLARITIN) 10 mg tablet Take 10 mg by mouth daily.  aspirin delayed-release 81 mg tablet Take  by mouth daily.  cyclobenzaprine (FLEXERIL) 10 mg tablet Take 10 mg by mouth as needed for Muscle Spasm(s). Takes a few x a month max       ROS  As per the HPI, otherwise a comprehensive ROS is negative.     Physical Examination:   Visit Vitals    BP (!) 146/91    Pulse 84    Temp 98.7 °F (37.1 °C) (Oral)    Resp 16    Ht 5' 6\" (1.676 m)    Wt 209 lb (94.8 kg)    SpO2 96%    BMI 33.73 kg/m2     General appearance - alert, well appearing, and in no distress  Mental status - oriented to person, place, and time  Mouth - mucous membranes moist, pharynx normal without lesions  Neck - supple, no significant adenopathy  Chest - clear to auscultation  Heart - normal rate, regular rhythm  Abdomen - soft, nontender, nondistended  Neurological - normal speech, no focal findings or movement disorder noted  Musculoskeletal - no joint tenderness, deformity or swelling  Extremities -  no pedal edema, no clubbing or cyanosis  Skin - normal coloration and turgor, no rashes, no suspicious skin lesions noted    LABS  Lab Results   Component Value Date/Time    WBC 10.7 05/24/2018 10:05 AM    HGB 14.1 05/24/2018 10:05 AM    HCT 42.0 05/24/2018 10:05 AM    PLATELET 298 (H) 02/97/9202 10:05 AM    MCV 88 05/24/2018 10:05 AM    ABS. NEUTROPHILS 5.5 04/10/2017 12:34 PM     Lab Results   Component Value Date/Time    Sodium 138 05/24/2018 10:05 AM    Potassium 4.6 05/24/2018 10:05 AM    Chloride 101 05/24/2018 10:05 AM    CO2 22 05/24/2018 10:05 AM    Glucose 98 05/24/2018 10:05 AM    BUN 12 05/24/2018 10:05 AM    Creatinine 0.81 05/24/2018 10:05 AM    GFR est  05/24/2018 10:05 AM    GFR est non-AA 87 05/24/2018 10:05 AM    Calcium 9.9 05/24/2018 10:05 AM     Lab Results   Component Value Date/Time    AST (SGOT) 25 05/24/2018 10:05 AM    Alk. phosphatase 65 05/24/2018 10:05 AM    Protein, total 7.1 05/24/2018 10:05 AM    Albumin 4.6 05/24/2018 10:05 AM    A-G Ratio 1.8 05/24/2018 10:05 AM     Lab Results   Component Value Date/Time    Ferritin 124 04/07/2015 09:25 AM    Vitamin B12 539 10/17/2017 09:12 AM     04/07/2015 09:25 AM    Sed rate (ESR) 5 04/07/2015 09:25 AM    C-Reactive Protein, Cardiac 5.61 (H) 03/13/2014 03:06 PM    TSH 3.610 05/24/2018 10:05 AM     3/13/14 - DIMPLE negative, JAK2 negative.     PLATELET (D72S8/OO)   Date Value   05/24/2018 536 (H)   04/10/2017 458 (H)   09/19/2016 495 (H)   03/22/2016 570 (H)   11/05/2015 474 (H)   09/30/2015 586 (H)   04/07/2015 493 (H)   03/30/2015 512 (H)   09/24/2014 490 (H)   03/13/2014 493 (H)   02/17/2014 507 (H)   11/23/2012 494 (H)   10/25/2011 464 (H)   04/28/2010 435 (H) PATHOLOGY  Bone marrow biopsy on 3/27/14 ([id number]) with active cellular marrow, no sign of hematologic disorder. ASSESSMENT/PLAN:     Ms. Ji Lauren is a 52 y.o. woman who presents for follow-up of thrombocytosis. 1. Thrombocytosis since 2010. Prior pt of Dr Emily Garcia. Records reviewed. Reviewed hx with pt today. Pt is clinically stable. Her platelet count had been more or less stable since 2010. No upward trend. Based on the bone marrow biopsy 2014 and the lab results there is no clear reason for her thrombocytosis. There is no sign of a primary hematologic disorder. This is likely secondary thrombocytosis versus normal for her. She has seen rheumatology and there is no sign of autoimmune disorder. She has had an EGD, but no colonoscopy. MRI brain for dizziness/headache. Last few CBCs showed a lymphocytosis so will do peripheral blood flow cytometry again with CBC/ diff. Will re check iron studies also. Reviewed with pt today most likely benign high platelets but will review tests once back. Pt is good with this plan. 2. Lymphocytosis. BMB negative. Will order peripheral blood flow for this. 3. Liver lesion. Steatosis and focal lesion better with weight loss. Call if questions  I'll see her again in 12 months, sooner if needed.     Torri Fine, DO

## 2024-01-30 ENCOUNTER — OFFICE VISIT (OUTPATIENT)
Age: 54
End: 2024-01-30
Payer: COMMERCIAL

## 2024-01-30 VITALS
BODY MASS INDEX: 35.01 KG/M2 | OXYGEN SATURATION: 97 % | WEIGHT: 231 LBS | HEART RATE: 78 BPM | HEIGHT: 68 IN | RESPIRATION RATE: 19 BRPM | SYSTOLIC BLOOD PRESSURE: 132 MMHG | TEMPERATURE: 98.8 F | DIASTOLIC BLOOD PRESSURE: 88 MMHG

## 2024-01-30 DIAGNOSIS — R30.0 BURNING WITH URINATION: ICD-10-CM

## 2024-01-30 DIAGNOSIS — J45.901 ACUTE BRONCHITIS WITH ASTHMA WITH ACUTE EXACERBATION: Primary | ICD-10-CM

## 2024-01-30 DIAGNOSIS — R35.0 URINARY FREQUENCY: ICD-10-CM

## 2024-01-30 DIAGNOSIS — J20.9 ACUTE BRONCHITIS WITH ASTHMA WITH ACUTE EXACERBATION: Primary | ICD-10-CM

## 2024-01-30 DIAGNOSIS — N30.00 ACUTE CYSTITIS WITHOUT HEMATURIA: ICD-10-CM

## 2024-01-30 PROBLEM — R30.9 URINARY PAIN: Status: ACTIVE | Noted: 2024-01-30

## 2024-01-30 LAB
BILIRUBIN, URINE, POC: NEGATIVE
BLOOD URINE, POC: NORMAL
GLUCOSE URINE, POC: NEGATIVE
KETONES, URINE, POC: NEGATIVE
LEUKOCYTE ESTERASE, URINE, POC: NORMAL
NITRITE, URINE, POC: NEGATIVE
PH, URINE, POC: 6 (ref 4.6–8)
PROTEIN,URINE, POC: NEGATIVE
SPECIFIC GRAVITY, URINE, POC: 1.01 (ref 1–1.03)
URINALYSIS CLARITY, POC: NORMAL
URINALYSIS COLOR, POC: YELLOW
UROBILINOGEN, POC: NORMAL

## 2024-01-30 PROCEDURE — 99214 OFFICE O/P EST MOD 30 MIN: CPT | Performed by: FAMILY MEDICINE

## 2024-01-30 PROCEDURE — 3075F SYST BP GE 130 - 139MM HG: CPT | Performed by: FAMILY MEDICINE

## 2024-01-30 PROCEDURE — 81003 URINALYSIS AUTO W/O SCOPE: CPT | Performed by: FAMILY MEDICINE

## 2024-01-30 PROCEDURE — 3079F DIAST BP 80-89 MM HG: CPT | Performed by: FAMILY MEDICINE

## 2024-01-30 RX ORDER — AMOXICILLIN AND CLAVULANATE POTASSIUM 875; 125 MG/1; MG/1
1 TABLET, FILM COATED ORAL 2 TIMES DAILY
Qty: 14 TABLET | Refills: 0 | Status: SHIPPED | OUTPATIENT
Start: 2024-01-30 | End: 2024-02-06

## 2024-01-30 RX ORDER — PREDNISONE 20 MG/1
40 TABLET ORAL DAILY
Qty: 10 TABLET | Refills: 0 | Status: SHIPPED | OUTPATIENT
Start: 2024-01-30 | End: 2024-02-04

## 2024-01-30 ASSESSMENT — ENCOUNTER SYMPTOMS
GASTROINTESTINAL NEGATIVE: 1
SINUS PAIN: 0
SINUS PRESSURE: 0
COUGH: 1
EYES NEGATIVE: 1
SORE THROAT: 0
CHEST TIGHTNESS: 1
SHORTNESS OF BREATH: 0
ABDOMINAL PAIN: 0
NAUSEA: 0
WHEEZING: 1
VOMITING: 0

## 2024-01-30 NOTE — PROGRESS NOTES
Chief Complaint   Patient presents with    Cough     5-6 days of cough and congestion in chest    Wheezing    Urinary Frequency     Started yesterday    Urinary Pain     HISTORY OF PRESENT ILLNESS     Cough/Asthma  Patient started 5-6 days ago w/ upper airway/bronchial irritation, chest congestion, cough, and wheezing. Cough has been wet sounding and last night for the first time she brought up a scan amount of yellow tinged sputum. Yesterday her chest felt tight and heavy. No pleuritic pain, sob, hemoptysis. She is compliant w/ her maintenance regimen of Advair bid but the past 4-5 days has been needing to use her Albuterol HFA 2 puffs 4 x a day. It does provide her relief for those ~ 6 hr intervals. Today she intentionally avoided using her rescue inhaler bc she wanted a true assessment of her lung exam w/o treatment. She did use her dose of Advair earlier this morning which was several hours ago. Endorses runny nose, sneezing, post nasal drainage to base of throat. Denies nasal congestion, sinus pain/pressure, discolored nasal dc, sore throat, fevers, chills. She has been taking Dayquil prn which seems to help break things up in her chest some. She did a home Covid test on 1-26-24 which was negative. 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 Former smoker, quit 1991      UTI  Duration or Onset of Symptoms: started yesterday    Positive Symptoms: urinary frequency and burning w/ urination    Negative Symptoms: denies urgency, incontinence, hematuria, back pain, flank pain, fevers, nausea, vomiting, vaginal discharge    Other ROS or Pertinent History: symptoms are starting off similar to past UTI's    Remedies Tried: none at present but she does take Cranberry daily as preventive    History of Recurrent UTI's: usually about once a year    Recent UTI History: none    Prior Successful Antibiotic Treatment: Ceftin, Augmentin    History of Pyelonephritis: several years ago    History of  Procedures or

## 2024-01-30 NOTE — PROGRESS NOTES
Chief Complaint   Patient presents with    Cough     5-6 days of cough and congestion in chest    Wheezing    Urinary Frequency     Started yesterday    Urinary Pain     1. \"Have you been to the ER, urgent care clinic since your last visit?  Hospitalized since your last visit?\" No    2. \"Have you seen or consulted any other health care providers outside of the [facility] since your last visit?\" No     3. For patients aged 45-75: Has the patient had a colonoscopy / FIT/ Cologuard? Yes - no Care Gap present 6/2022 Tubular Adenoma, Repeat 5 yrs; Dr. Stoll       If the patient is female:    4. For patients aged 40-74: Has the patient had a mammogram within the past 2 years? Yes - no Care Gap present 12/2022      5. For patients aged 21-65: Has the patient had a pap smear? No

## 2024-02-02 LAB
BACTERIA SPEC CULT: ABNORMAL
CC UR VC: ABNORMAL
SERVICE CMNT-IMP: ABNORMAL

## 2024-02-06 DIAGNOSIS — I10 ESSENTIAL (PRIMARY) HYPERTENSION: ICD-10-CM

## 2024-02-06 RX ORDER — SPIRONOLACTONE 50 MG/1
50 TABLET, FILM COATED ORAL DAILY
Qty: 90 TABLET | Refills: 1 | Status: SHIPPED | OUTPATIENT
Start: 2024-02-06

## 2024-02-21 DIAGNOSIS — I10 ESSENTIAL (PRIMARY) HYPERTENSION: ICD-10-CM

## 2024-02-21 RX ORDER — SPIRONOLACTONE 50 MG/1
50 TABLET, FILM COATED ORAL DAILY
Qty: 14 TABLET | Refills: 0 | Status: SHIPPED | OUTPATIENT
Start: 2024-02-21

## 2024-02-21 NOTE — TELEPHONE ENCOUNTER
Patient comment: Missing delivery.  I have 7 left.  Could we have a small supply sent to [facility]? (I [facility].)

## 2024-03-12 ENCOUNTER — OFFICE VISIT (OUTPATIENT)
Age: 54
End: 2024-03-12
Payer: COMMERCIAL

## 2024-03-12 VITALS
RESPIRATION RATE: 16 BRPM | SYSTOLIC BLOOD PRESSURE: 130 MMHG | WEIGHT: 223 LBS | HEART RATE: 66 BPM | HEIGHT: 68 IN | DIASTOLIC BLOOD PRESSURE: 85 MMHG | BODY MASS INDEX: 33.8 KG/M2 | OXYGEN SATURATION: 98 % | TEMPERATURE: 97.8 F

## 2024-03-12 DIAGNOSIS — K76.0 FATTY (CHANGE OF) LIVER, NOT ELSEWHERE CLASSIFIED: Primary | ICD-10-CM

## 2024-03-12 PROCEDURE — 3079F DIAST BP 80-89 MM HG: CPT | Performed by: PHYSICIAN ASSISTANT

## 2024-03-12 PROCEDURE — 91200 LIVER ELASTOGRAPHY: CPT | Performed by: PHYSICIAN ASSISTANT

## 2024-03-12 PROCEDURE — 3075F SYST BP GE 130 - 139MM HG: CPT | Performed by: PHYSICIAN ASSISTANT

## 2024-03-12 PROCEDURE — 99214 OFFICE O/P EST MOD 30 MIN: CPT | Performed by: PHYSICIAN ASSISTANT

## 2024-03-12 ASSESSMENT — PATIENT HEALTH QUESTIONNAIRE - PHQ9
1. LITTLE INTEREST OR PLEASURE IN DOING THINGS: 0
SUM OF ALL RESPONSES TO PHQ QUESTIONS 1-9: 0
2. FEELING DOWN, DEPRESSED OR HOPELESS: 0
SUM OF ALL RESPONSES TO PHQ QUESTIONS 1-9: 0
SUM OF ALL RESPONSES TO PHQ QUESTIONS 1-9: 0
SUM OF ALL RESPONSES TO PHQ9 QUESTIONS 1 & 2: 0
SUM OF ALL RESPONSES TO PHQ QUESTIONS 1-9: 0

## 2024-03-12 NOTE — PROGRESS NOTES
Identified pt with two pt identifiers(name and ). Reviewed record in preparation for visit and have obtained necessary documentation.  Vitals:    24 0748   BP: 130/85   Site: Right Upper Arm   Position: Sitting   Cuff Size: Large Adult   Pulse: 66   Resp: 16   Temp: 97.8 °F (36.6 °C)   TempSrc: Temporal   SpO2: 98%   Weight: 101.2 kg (223 lb)   Height: 1.727 m (5' 8\")        Health Maintenance Review: Patient reminded of \"due or due soon\" health maintenance. I have asked the patient to contact his/her primary care provider (PCP) for follow-up on his/her health maintenance.    Coordination of Care Questionnaire:  :   1) Have you been to an emergency room, urgent care, or hospitalized since your last visit?  If yes, where when, and reason for visit? no       2. Have seen or consulted any other health care provider since your last visit?   If yes, where when, and reason for visit?  Yes, PCP and neurologist f/u      Patient is accompanied by self I have received verbal consent from Paola Luna to discuss any/all medical information while they are present in the room.

## 2024-03-12 NOTE — PROGRESS NOTES
[facility]      Cipriano Stokes MD, FACP, FACG, FAASLD      SURAJ Morrow-HAILEE Elias, [facility]   Magi Martinezserena, [facility]   Laquitaedward Sanchez, [facility]  Mike Waterman, [facility] [place]   Dulce Ye, [facility]   Payal Aston, [facility] [place]   [address], [address]   [place]  [address] [phone]   FAX: [phone]  [facility]   [address], [address]   [place]  [address] [phone]   FAX: [phone]     Patient Care Team:  Jackelyn Malhotra MD as PCP - General  Jackelyn Malhotra MD as PCP - Empaneled Provider  Linette Parks DO (Neurology)    Patient Active Problem List   Diagnosis    Benign essential hypertension    Primary dysmenorrhea    Asthma    Depression    Muscle contraction headache    Rosacea    Fibrocystic breast changes    NAFL (nonalcoholic fatty liver)    Hyposomnia, insomnia or sleeplessness associated with anxiety    Mechanical back pain    History of pyelonephritis    Allergic rhinitis    Anxiety attack    PMS (premenstrual syndrome)    Cataract of right eye    Microhematuria    Raynaud's syndrome    Focal nodular hyperplasia of liver    Controlled substance agreement signed    Thrombocytosis    Lymphocytosis    Vitamin D deficiency    Prediabetes    Vertigo    Mixed hyperlipidemia    Excessive physiologic tremor     Paola Luna is being seen at [facility] for management of non-alcoholic fatty liver (NAFL). The active problem list, all pertinent past medical history, medications, radiologic findings and laboratory findings related to the liver disorder were reviewed and discussed with the patient.      The patient is a 53 y.o.  female who is suspected to have fatty liver disease based upon imaging studies.     She

## 2024-03-13 LAB
ALBUMIN SERPL-MCNC: 4.7 G/DL (ref 3.8–4.9)
ALP SERPL-CCNC: 94 IU/L (ref 44–121)
ALT SERPL-CCNC: 27 IU/L (ref 0–32)
AST SERPL-CCNC: 25 IU/L (ref 0–40)
BILIRUB DIRECT SERPL-MCNC: 0.1 MG/DL (ref 0–0.4)
BILIRUB SERPL-MCNC: 0.3 MG/DL (ref 0–1.2)
ERYTHROCYTE [DISTWIDTH] IN BLOOD BY AUTOMATED COUNT: 13.4 % (ref 11.7–15.4)
HCT VFR BLD AUTO: 41.4 % (ref 34–46.6)
HGB BLD-MCNC: 13.3 G/DL (ref 11.1–15.9)
MCH RBC QN AUTO: 29 PG (ref 26.6–33)
MCHC RBC AUTO-ENTMCNC: 32.1 G/DL (ref 31.5–35.7)
MCV RBC AUTO: 90 FL (ref 79–97)
PLATELET # BLD AUTO: 556 X10E3/UL (ref 150–450)
PROT SERPL-MCNC: 7 G/DL (ref 6–8.5)
RBC # BLD AUTO: 4.59 X10E6/UL (ref 3.77–5.28)
WBC # BLD AUTO: 9.8 X10E3/UL (ref 3.4–10.8)

## 2024-03-13 NOTE — RESULT ENCOUNTER NOTE
Pt notified via  message of stable findings. Follow-up as scheduled.  Will forward copy of labs to PCP, following for thrombocytosis.

## 2024-03-19 RX ORDER — ATORVASTATIN CALCIUM 20 MG/1
TABLET, FILM COATED ORAL
Qty: 90 TABLET | Refills: 1 | Status: SHIPPED | OUTPATIENT
Start: 2024-03-19

## 2024-05-08 ENCOUNTER — OFFICE VISIT (OUTPATIENT)
Age: 54
End: 2024-05-08
Payer: COMMERCIAL

## 2024-05-08 VITALS
OXYGEN SATURATION: 95 % | HEART RATE: 80 BPM | WEIGHT: 224 LBS | HEIGHT: 68 IN | BODY MASS INDEX: 33.95 KG/M2 | SYSTOLIC BLOOD PRESSURE: 128 MMHG | DIASTOLIC BLOOD PRESSURE: 70 MMHG

## 2024-05-08 DIAGNOSIS — G25.0 ESSENTIAL TREMOR: Primary | ICD-10-CM

## 2024-05-08 DIAGNOSIS — G43.009 MIGRAINE WITHOUT AURA, NOT INTRACTABLE, WITHOUT STATUS MIGRAINOSUS: ICD-10-CM

## 2024-05-08 PROCEDURE — 99214 OFFICE O/P EST MOD 30 MIN: CPT | Performed by: PSYCHIATRY & NEUROLOGY

## 2024-05-08 PROCEDURE — 3078F DIAST BP <80 MM HG: CPT | Performed by: PSYCHIATRY & NEUROLOGY

## 2024-05-08 PROCEDURE — 3074F SYST BP LT 130 MM HG: CPT | Performed by: PSYCHIATRY & NEUROLOGY

## 2024-05-08 RX ORDER — SUMATRIPTAN 50 MG/1
50 TABLET, FILM COATED ORAL PRN
Qty: 9 TABLET | Refills: 2 | Status: SHIPPED | OUTPATIENT
Start: 2024-05-08

## 2024-05-08 RX ORDER — PROPRANOLOL HYDROCHLORIDE 80 MG/1
80 CAPSULE, EXTENDED RELEASE ORAL DAILY
Qty: 90 CAPSULE | Refills: 1 | Status: SHIPPED | OUTPATIENT
Start: 2024-05-08

## 2024-05-08 ASSESSMENT — PATIENT HEALTH QUESTIONNAIRE - PHQ9
SUM OF ALL RESPONSES TO PHQ QUESTIONS 1-9: 0
SUM OF ALL RESPONSES TO PHQ QUESTIONS 1-9: 0
2. FEELING DOWN, DEPRESSED OR HOPELESS: NOT AT ALL
SUM OF ALL RESPONSES TO PHQ QUESTIONS 1-9: 0
SUM OF ALL RESPONSES TO PHQ9 QUESTIONS 1 & 2: 0
1. LITTLE INTEREST OR PLEASURE IN DOING THINGS: NOT AT ALL
SUM OF ALL RESPONSES TO PHQ QUESTIONS 1-9: 0

## 2024-05-08 NOTE — PROGRESS NOTES
Neurology Clinic Follow up Note    Patient ID:  Paola Luna  [id number]  1970     Ms. Luna is here for follow up today of  Chief Complaint   Patient presents with    OTHER     Pt returning for H/O Pt reports Rt hand has increased tremors, she has cut down on her caffeine intake. Migraines are improved.         Last Appointment With Me:  11/6/2023       \"Paola Luna is presenting for evaluation of headaches.    5/23/22 she was seen in the ED due to room spinning/vertigo with emesis. She was diagnosed as vertigo with eventual resolution after 2 days. She experienced a subsequent headache 3 days thereafter lasting approximately 1 week. She has a h/o headache since  childhood occurring sporadically, typically of shorter duration.       Location: All over   Character: Pressure, ache   Intensity: On average 8/10    Frequency: More severe headaches once every 2 weeks, more mild headache twice weekly   # HA free days per month: 20+   Duration: 12 hours   Aura: None   Associated Sx with HA: +nausea, +phonophotophobia.    Neurological ROS: Denies focal weakness, numbness or vision loss associated with headaches.    Systemic ROS:    +Snoring, no AB on prior PSG   Caffeine use: Reports 100-150mg daily of caffeine   H/O Head trauma: None   Depressive or anxiety Sx: On treatment, controlled      Any change in pattern of HA? See above      Triggers: Stress. Non-positional.    Alleviating factors: Rest/sleep   FHx HA/migraine: Mother      Treatment so far: Naproxen-typically works for her except for her most recent headache      Investigations so far: MRI Brain 2016 [facility], repeat MRI Brain 12/2021 unchanged per patient (no records available for review)\"        Interval History:   R>L hand tremors related to ET have increased slightly since last visit. She is compliant with propranolol 20mg TID. No reported side effects with current dosing. Also previously reported improvement in mood/anxiety with medication.

## 2024-05-09 RX ORDER — PROPRANOLOL HYDROCHLORIDE 20 MG/1
20 TABLET ORAL 3 TIMES DAILY
Qty: 270 TABLET | Refills: 3 | OUTPATIENT
Start: 2024-05-09

## 2024-05-16 RX ORDER — PROPRANOLOL HYDROCHLORIDE 20 MG/1
20 TABLET ORAL 3 TIMES DAILY
Qty: 90 TABLET | Refills: 5 | OUTPATIENT
Start: 2024-05-16

## 2024-07-08 ENCOUNTER — TELEPHONE (OUTPATIENT)
Age: 54
End: 2024-07-08

## 2024-07-08 DIAGNOSIS — F41.9 HYPOSOMNIA, INSOMNIA OR SLEEPLESSNESS ASSOCIATED WITH ANXIETY: ICD-10-CM

## 2024-07-08 DIAGNOSIS — F41.1 GENERALIZED ANXIETY DISORDER: ICD-10-CM

## 2024-07-08 DIAGNOSIS — F51.05 HYPOSOMNIA, INSOMNIA OR SLEEPLESSNESS ASSOCIATED WITH ANXIETY: ICD-10-CM

## 2024-07-08 RX ORDER — ALPRAZOLAM 0.5 MG/1
TABLET ORAL
Qty: 15 TABLET | Refills: 2 | Status: SHIPPED | OUTPATIENT
Start: 2024-07-08 | End: 2024-08-07

## 2024-07-08 NOTE — TELEPHONE ENCOUNTER
PCP: Jackelyn Malhotra MD    Last appt: 1/30/2024   Future Appointments   Date Time Provider Department Center   8/21/2024  8:00 AM Jackelyn Malhotra MD [facility] AMB   12/6/2024  8:00 AM Linette Parks DO [facility] AMB   3/11/2025  7:40 AM Theresa Lundberg PA [facility] AMB       Requested Prescriptions     Pending Prescriptions Disp Refills    ALPRAZolam (XANAX) 0.5 MG tablet 15 tablet 5     Sig: TAKE 1/2 TABLET BY MOUTH NIGHTLY AS NEEDED FOR ANXIETY OR SLEEP *MAX DAILY AMOUNT: 0.25MG*         Prior labs and Blood pressures:  BP Readings from Last 3 Encounters:   05/08/24 128/70   03/12/24 130/85   01/30/24 132/88     Lab Results   Component Value Date/Time     07/17/2023 09:48 AM    K 5.1 07/17/2023 09:48 AM     07/17/2023 09:48 AM    CO2 26 07/17/2023 09:48 AM    BUN 14 07/17/2023 09:48 AM    GFRAA >60 07/22/2022 07:30 AM     No results found for: \"HBA1C\", \"[id number]\"  Lab Results   Component Value Date/Time    CHOL 183 07/17/2023 09:48 AM    HDL 41 07/17/2023 09:48 AM    LDL 84.4 07/17/2023 09:48 AM    VLDL 57.6 07/17/2023 09:48 AM     No results found for: \"VITD3\"    Lab Results   Component Value Date/Time    TSH 3.62 07/22/2022 07:30 AM

## 2024-07-08 NOTE — TELEPHONE ENCOUNTER
Patient called stating that she is leaving town early tomorrow morning, and was hoping to get her Xanax refilled. Patient is unable to have it filled until midnight tonight. She is really hoping that we can call this in for her today, so she is able to have it packed and ready for tomorrow. Patient is hoping that we can call this in for her. [facility] she is hoping we can [facility] is [phone]. Patient is hoping that this can be done as soon as possible.    Best call back number  [phone]

## 2024-07-26 RX ORDER — FLUTICASONE PROPIONATE AND SALMETEROL 100; 50 UG/1; UG/1
POWDER RESPIRATORY (INHALATION)
Qty: 3 EACH | Refills: 1 | Status: SHIPPED | OUTPATIENT
Start: 2024-07-26

## 2024-07-26 NOTE — TELEPHONE ENCOUNTER
PCP: Jackelyn Malhotra MD    Last appt: 1/30/2024   Future Appointments   Date Time Provider Department Center   8/21/2024  8:00 AM Jackelyn Malhotra MD [facility] AMB   12/6/2024  8:00 AM Linette Parks DO [facility] AMB   3/11/2025  7:40 AM Theresa Lundberg PA [facility] AMB       Requested Prescriptions     Pending Prescriptions Disp Refills    fluticasone-salmeterol (ADVAIR) 100-50 MCG/ACT AEPB diskus inhaler [Pharmacy Med Name: FLUTICASONE/SALMETEROL DISKUS 60'S 100/50]  3     Sig: USE 1 INHALATION EVERY 12 HOURS         Prior labs and Blood pressures:  BP Readings from Last 3 Encounters:   05/08/24 128/70   03/12/24 130/85   01/30/24 132/88     Lab Results   Component Value Date/Time     07/17/2023 09:48 AM    K 5.1 07/17/2023 09:48 AM     07/17/2023 09:48 AM    CO2 26 07/17/2023 09:48 AM    BUN 14 07/17/2023 09:48 AM    GFRAA >60 07/22/2022 07:30 AM     No results found for: \"HBA1C\", \"[id number]\"  Lab Results   Component Value Date/Time    CHOL 183 07/17/2023 09:48 AM    HDL 41 07/17/2023 09:48 AM    LDL 84.4 07/17/2023 09:48 AM    VLDL 57.6 07/17/2023 09:48 AM     No results found for: \"VITD3\"    Lab Results   Component Value Date/Time    TSH 2.97 07/17/2023 09:48 AM

## 2024-08-05 DIAGNOSIS — I10 ESSENTIAL (PRIMARY) HYPERTENSION: ICD-10-CM

## 2024-08-05 RX ORDER — SPIRONOLACTONE 50 MG/1
50 TABLET, FILM COATED ORAL DAILY
Qty: 30 TABLET | Refills: 0 | Status: SHIPPED | OUTPATIENT
Start: 2024-08-05

## 2024-08-05 NOTE — TELEPHONE ENCOUNTER
PCP: Jackelyn Malhotra MD    Last appt: 1/30/2024     Future Appointments   Date Time Provider Department Center   8/21/2024  8:00 AM [facility]   12/6/2024  8:00 AM Linette Parks DO [facility] AMB   3/11/2025  7:40 AM Theresa Lundberg PA [facility] AMB       Requested Prescriptions     Pending Prescriptions Disp Refills    spironolactone (ALDACTONE) 50 MG tablet [Pharmacy Med Name: SPIRONOLACTONE TABS 50MG] 30 tablet 0     Sig: TAKE 1 TABLET DAILY         Prior labs and Blood pressures:  BP Readings from Last 3 Encounters:   05/08/24 128/70   03/12/24 130/85   01/30/24 132/88     Lab Results   Component Value Date/Time     07/17/2023 09:48 AM    K 5.1 07/17/2023 09:48 AM     07/17/2023 09:48 AM    CO2 26 07/17/2023 09:48 AM    BUN 14 07/17/2023 09:48 AM    GFRAA >60 07/22/2022 07:30 AM     No results found for: \"HBA1C\", \"[id number]\"  Lab Results   Component Value Date/Time    CHOL 183 07/17/2023 09:48 AM    HDL 41 07/17/2023 09:48 AM    LDL 84.4 07/17/2023 09:48 AM    VLDL 57.6 07/17/2023 09:48 AM     No results found for: \"VITD3\"    Lab Results   Component Value Date/Time    TSH 2.97 07/17/2023 09:48 AM

## 2024-08-19 DIAGNOSIS — I10 BENIGN ESSENTIAL HYPERTENSION: ICD-10-CM

## 2024-08-19 RX ORDER — AMLODIPINE BESYLATE 5 MG/1
TABLET ORAL
Qty: 90 TABLET | Refills: 0 | Status: SHIPPED | OUTPATIENT
Start: 2024-08-19

## 2024-08-19 NOTE — TELEPHONE ENCOUNTER
PCP: Jackelyn Malhotra MD    Last appt: 1/30/2024     Future Appointments   Date Time Provider Department Center   8/21/2024  8:00 AM [facility]   12/6/2024  8:00 AM Linette Parks DO [facility] AMB   3/11/2025  7:40 AM Theresa Lundberg PA [facility] BS AMB       Requested Prescriptions     Pending Prescriptions Disp Refills    amLODIPine (NORVASC) 5 MG tablet [Pharmacy Med Name: AMLODIPINE BESYLATE TABS 5MG] 90 tablet 3     Sig: TAKE 1 TABLET DAILY       Prior labs and Blood pressures:  BP Readings from Last 3 Encounters:   05/08/24 128/70   03/12/24 130/85   01/30/24 132/88     Lab Results   Component Value Date/Time     07/17/2023 09:48 AM    K 5.1 07/17/2023 09:48 AM     07/17/2023 09:48 AM    CO2 26 07/17/2023 09:48 AM    BUN 14 07/17/2023 09:48 AM    GFRAA >60 07/22/2022 07:30 AM     No results found for: \"HBA1C\", \"NMB5MILD\"  Lab Results   Component Value Date/Time    CHOL 183 07/17/2023 09:48 AM    HDL 41 07/17/2023 09:48 AM    LDL 84.4 07/17/2023 09:48 AM    VLDL 57.6 07/17/2023 09:48 AM     No results found for: \"VITD3\"    Lab Results   Component Value Date/Time    TSH 2.97 07/17/2023 09:48 AM

## 2024-08-21 ENCOUNTER — OFFICE VISIT (OUTPATIENT)
Age: 54
End: 2024-08-21
Payer: COMMERCIAL

## 2024-08-21 VITALS
OXYGEN SATURATION: 97 % | HEIGHT: 68 IN | HEART RATE: 72 BPM | RESPIRATION RATE: 16 BRPM | SYSTOLIC BLOOD PRESSURE: 110 MMHG | TEMPERATURE: 97.5 F | BODY MASS INDEX: 34.16 KG/M2 | DIASTOLIC BLOOD PRESSURE: 80 MMHG | WEIGHT: 225.4 LBS

## 2024-08-21 DIAGNOSIS — Z51.81 ENCOUNTER FOR MEDICATION MONITORING: ICD-10-CM

## 2024-08-21 DIAGNOSIS — E78.2 MIXED HYPERLIPIDEMIA: ICD-10-CM

## 2024-08-21 DIAGNOSIS — Z11.59 NEED FOR HEPATITIS C SCREENING TEST: ICD-10-CM

## 2024-08-21 DIAGNOSIS — Z11.4 SCREENING FOR HUMAN IMMUNODEFICIENCY VIRUS: ICD-10-CM

## 2024-08-21 DIAGNOSIS — K76.0 NAFL (NONALCOHOLIC FATTY LIVER): ICD-10-CM

## 2024-08-21 DIAGNOSIS — R73.03 PREDIABETES: ICD-10-CM

## 2024-08-21 DIAGNOSIS — I10 BENIGN ESSENTIAL HYPERTENSION: ICD-10-CM

## 2024-08-21 DIAGNOSIS — F41.9 HYPOSOMNIA, INSOMNIA OR SLEEPLESSNESS ASSOCIATED WITH ANXIETY: ICD-10-CM

## 2024-08-21 DIAGNOSIS — Z00.00 ANNUAL PHYSICAL EXAM: Primary | ICD-10-CM

## 2024-08-21 DIAGNOSIS — Z79.899 CONTROLLED SUBSTANCE AGREEMENT SIGNED: ICD-10-CM

## 2024-08-21 DIAGNOSIS — F51.05 HYPOSOMNIA, INSOMNIA OR SLEEPLESSNESS ASSOCIATED WITH ANXIETY: ICD-10-CM

## 2024-08-21 DIAGNOSIS — F41.1 GENERALIZED ANXIETY DISORDER: ICD-10-CM

## 2024-08-21 LAB
ALBUMIN SERPL-MCNC: 4 G/DL (ref 3.5–5)
ALBUMIN/GLOB SERPL: 1.3 (ref 1.1–2.2)
ALP SERPL-CCNC: 90 U/L (ref 45–117)
ALT SERPL-CCNC: 33 U/L (ref 12–78)
ANION GAP SERPL CALC-SCNC: 5 MMOL/L (ref 5–15)
AST SERPL-CCNC: 20 U/L (ref 15–37)
BILIRUB SERPL-MCNC: 0.4 MG/DL (ref 0.2–1)
BUN SERPL-MCNC: 15 MG/DL (ref 6–20)
BUN/CREAT SERPL: 17 (ref 12–20)
CALCIUM SERPL-MCNC: 10 MG/DL (ref 8.5–10.1)
CHLORIDE SERPL-SCNC: 106 MMOL/L (ref 97–108)
CHOLEST SERPL-MCNC: 175 MG/DL
CO2 SERPL-SCNC: 25 MMOL/L (ref 21–32)
COMMENT:: NORMAL
CREAT SERPL-MCNC: 0.86 MG/DL (ref 0.55–1.02)
EST. AVERAGE GLUCOSE BLD GHB EST-MCNC: 123 MG/DL
GLOBULIN SER CALC-MCNC: 3.2 G/DL (ref 2–4)
GLUCOSE SERPL-MCNC: 101 MG/DL (ref 65–100)
HBA1C MFR BLD: 5.9 % (ref 4–5.6)
HCV AB SER IA-ACNC: 0.03 INDEX
HCV AB SERPL QL IA: NONREACTIVE
HDLC SERPL-MCNC: 41 MG/DL
HDLC SERPL: 4.3 (ref 0–5)
HIV 1+2 AB+HIV1 P24 AG SERPL QL IA: NONREACTIVE
HIV 1/2 RESULT COMMENT: NORMAL
LDLC SERPL CALC-MCNC: 77.6 MG/DL (ref 0–100)
POTASSIUM SERPL-SCNC: 4.8 MMOL/L (ref 3.5–5.1)
PROT SERPL-MCNC: 7.2 G/DL (ref 6.4–8.2)
SODIUM SERPL-SCNC: 136 MMOL/L (ref 136–145)
SPECIMEN HOLD: NORMAL
TRIGL SERPL-MCNC: 282 MG/DL
TSH SERPL DL<=0.05 MIU/L-ACNC: 3.23 UIU/ML (ref 0.36–3.74)
VLDLC SERPL CALC-MCNC: 56.4 MG/DL

## 2024-08-21 PROCEDURE — 99213 OFFICE O/P EST LOW 20 MIN: CPT | Performed by: FAMILY MEDICINE

## 2024-08-21 PROCEDURE — 3079F DIAST BP 80-89 MM HG: CPT | Performed by: FAMILY MEDICINE

## 2024-08-21 PROCEDURE — 3074F SYST BP LT 130 MM HG: CPT | Performed by: FAMILY MEDICINE

## 2024-08-21 PROCEDURE — 99396 PREV VISIT EST AGE 40-64: CPT | Performed by: FAMILY MEDICINE

## 2024-08-21 RX ORDER — ALPRAZOLAM 0.5 MG/1
0.25 TABLET ORAL NIGHTLY PRN
COMMUNITY
Start: 2024-08-15

## 2024-08-21 SDOH — ECONOMIC STABILITY: INCOME INSECURITY: HOW HARD IS IT FOR YOU TO PAY FOR THE VERY BASICS LIKE FOOD, HOUSING, MEDICAL CARE, AND HEATING?: NOT HARD AT ALL

## 2024-08-21 SDOH — ECONOMIC STABILITY: FOOD INSECURITY: WITHIN THE PAST 12 MONTHS, YOU WORRIED THAT YOUR FOOD WOULD RUN OUT BEFORE YOU GOT MONEY TO BUY MORE.: NEVER TRUE

## 2024-08-21 SDOH — ECONOMIC STABILITY: FOOD INSECURITY: WITHIN THE PAST 12 MONTHS, THE FOOD YOU BOUGHT JUST DIDN'T LAST AND YOU DIDN'T HAVE MONEY TO GET MORE.: NEVER TRUE

## 2024-08-21 ASSESSMENT — PATIENT HEALTH QUESTIONNAIRE - PHQ9
10. IF YOU CHECKED OFF ANY PROBLEMS, HOW DIFFICULT HAVE THESE PROBLEMS MADE IT FOR YOU TO DO YOUR WORK, TAKE CARE OF THINGS AT HOME, OR GET ALONG WITH OTHER PEOPLE: NOT DIFFICULT AT ALL
SUM OF ALL RESPONSES TO PHQ QUESTIONS 1-9: 6
6. FEELING BAD ABOUT YOURSELF - OR THAT YOU ARE A FAILURE OR HAVE LET YOURSELF OR YOUR FAMILY DOWN: SEVERAL DAYS
7. TROUBLE CONCENTRATING ON THINGS, SUCH AS READING THE NEWSPAPER OR WATCHING TELEVISION: SEVERAL DAYS
3. TROUBLE FALLING OR STAYING ASLEEP: NOT AT ALL
SUM OF ALL RESPONSES TO PHQ QUESTIONS 1-9: 6
SUM OF ALL RESPONSES TO PHQ QUESTIONS 1-9: 6
2. FEELING DOWN, DEPRESSED OR HOPELESS: NOT AT ALL
SUM OF ALL RESPONSES TO PHQ9 QUESTIONS 1 & 2: 0
4. FEELING TIRED OR HAVING LITTLE ENERGY: SEVERAL DAYS
SUM OF ALL RESPONSES TO PHQ QUESTIONS 1-9: 6
5. POOR APPETITE OR OVEREATING: NEARLY EVERY DAY
1. LITTLE INTEREST OR PLEASURE IN DOING THINGS: NOT AT ALL
9. THOUGHTS THAT YOU WOULD BE BETTER OFF DEAD, OR OF HURTING YOURSELF: NOT AT ALL
8. MOVING OR SPEAKING SO SLOWLY THAT OTHER PEOPLE COULD HAVE NOTICED. OR THE OPPOSITE, BEING SO FIGETY OR RESTLESS THAT YOU HAVE BEEN MOVING AROUND A LOT MORE THAN USUAL: NOT AT ALL

## 2024-08-21 ASSESSMENT — ENCOUNTER SYMPTOMS
EYES NEGATIVE: 1
RESPIRATORY NEGATIVE: 1
GASTROINTESTINAL NEGATIVE: 1

## 2024-08-21 NOTE — PROGRESS NOTES
Chief Complaint   Patient presents with    Annual Exam     Fasting     HISTORY OF PRESENT ILLNESS   HPI  Annual CPE  Fasting for labs  History of Mixed Hyperlipidemia, Prediabetes, Benign Essential HTN, Hepatic Steatosis  Complying routinely with medication regimen updated below and tolerating without reaction or side effects  Blood pressures have remained well-controlled and stable  Diet: since 3-2024 cut out meat except seafood, otherwise nothing special right now and states eating habits not great but she is considering Weight Watchers  Caffeine: 1 Diet Mountain Dew qam, 1 Diet Coke in afternoon, occ coffee, no tea  Exercise: walks her 2 dogs qd x 45 minutes x 1 mile, bikes 12 miles x 1 hr 3-4 x a week, leg exercises pool qd x 1 hr; she has a home gym w/ full equipment to work out but admits she doesn't use it  Weight: had been going up over time since not eating healthy or exercising regularly; got up to the upper 230's and staying there; personal goal would be ~ 200 lbs for now; got down to 180 lbs doing [employer] 2017 x 1 yr          Follow up Anxiety, Depression, Insomnia and Medication Check  Mood and sleep remain stable on her usual regimen of Zoloft 75 mg daily and Xanax 0.5 mg 1/2 tablet nightly. Denies feeling sad, depressed, labile mood swings. Enjoys her job and keeps busy at work which she prefers bc it gives her balance, keeps her mind preoccupied and keeps her from having sad ruminating thoughts or feelings of depression. She enjoys quiet time at home and being w/ her dogs. Goes out walking a few times a week. Has not needed counseling for a long time and still doesn't feel she has needed one for a long time. She did counseling back in ~ 2016 at the mandate of her supervisor at work back then but otherwise has not done counseling in a long time. She continues to take the Xanax 0.5 mg 1/2 tablet nightly for sleep. It helps her fall asleep and stay asleep soundly. Keeps her mind from racing all

## 2024-08-21 NOTE — PROGRESS NOTES
Chief Complaint   Patient presents with    Annual Exam     \"Have you been to the ER, urgent care clinic since your last visit?  Hospitalized since your last visit?\"    NO    “Have you seen or consulted any other health care providers outside of [facility] since your last visit?”    NO                   8/21/2024     8:00 AM   PHQ-9    Little interest or pleasure in doing things 0   Feeling down, depressed, or hopeless 0   Trouble falling or staying asleep, or sleeping too much 0   Feeling tired or having little energy 1   Poor appetite or overeating 3   Feeling bad about yourself - or that you are a failure or have let yourself or your family down 1   Trouble concentrating on things, such as reading the newspaper or watching television 1   Moving or speaking so slowly that other people could have noticed. Or the opposite - being so fidgety or restless that you have been moving around a lot more than usual 0   Thoughts that you would be better off dead, or of hurting yourself in some way 0   PHQ-2 Score 0   PHQ-9 Total Score 6   If you checked off any problems, how difficult have these problems made it for you to do your work, take care of things at home, or get along with other people? 0           Financial Resource Strain: Low Risk  (8/21/2024)    Overall Financial Resource Strain (CARDIA)     Difficulty of Paying Living Expenses: Not hard at all      Food Insecurity: No Food Insecurity (8/21/2024)    Hunger Vital Sign     Worried About Running Out of Food in the Last Year: Never true     Ran Out of Food in the Last Year: Never true          Health Maintenance Due   Topic Date Due    HIV screen  Never done    Hepatitis C screen  Never done    Shingles vaccine (1 of 2) Never done    A1C test (Diabetic or Prediabetic)  07/17/2024    Lipids  07/17/2024    Flu vaccine (1) 08/01/2024

## 2024-08-26 LAB
DRUG NAME: NORMAL
DRUGS UR: NORMAL
MED LIST NOT PROVIDED?: NO
MED LIST ON REQUISITION?: NO
MED LIST ON SEPARATE FORM?: NO
NO MEDICATION USE?: NO
RX NORM CODE: NORMAL
RX NORM SOURCE: NORMAL
RX NORM TEXT: NORMAL
SEQUENCE NUMBER: NORMAL

## 2024-08-28 ENCOUNTER — TELEMEDICINE (OUTPATIENT)
Age: 54
End: 2024-08-28
Payer: COMMERCIAL

## 2024-08-28 DIAGNOSIS — N30.00 ACUTE CYSTITIS WITHOUT HEMATURIA: Primary | ICD-10-CM

## 2024-08-28 DIAGNOSIS — N30.00 ACUTE CYSTITIS WITHOUT HEMATURIA: ICD-10-CM

## 2024-08-28 LAB
APPEARANCE UR: CLEAR
BACTERIA URNS QL MICRO: NEGATIVE /HPF
BILIRUB UR QL: NEGATIVE
COLOR UR: ABNORMAL
EPITH CASTS URNS QL MICRO: ABNORMAL /LPF
GLUCOSE UR STRIP.AUTO-MCNC: NEGATIVE MG/DL
HGB UR QL STRIP: NEGATIVE
HYALINE CASTS URNS QL MICRO: ABNORMAL /LPF (ref 0–5)
KETONES UR QL STRIP.AUTO: NEGATIVE MG/DL
LEUKOCYTE ESTERASE UR QL STRIP.AUTO: NEGATIVE
NITRITE UR QL STRIP.AUTO: POSITIVE
PH UR STRIP: 6 (ref 5–8)
PROT UR STRIP-MCNC: NEGATIVE MG/DL
RBC #/AREA URNS HPF: ABNORMAL /HPF (ref 0–5)
SP GR UR REFRACTOMETRY: 1.01 (ref 1–1.03)
UROBILINOGEN UR QL STRIP.AUTO: 1 EU/DL (ref 0.2–1)
WBC URNS QL MICRO: ABNORMAL /HPF (ref 0–4)

## 2024-08-28 PROCEDURE — 99213 OFFICE O/P EST LOW 20 MIN: CPT | Performed by: FAMILY MEDICINE

## 2024-08-28 RX ORDER — CIPROFLOXACIN 250 MG/1
250 TABLET, FILM COATED ORAL 2 TIMES DAILY
Qty: 6 TABLET | Refills: 0 | Status: SHIPPED | OUTPATIENT
Start: 2024-08-28 | End: 2024-08-31

## 2024-08-28 RX ORDER — PHENAZOPYRIDINE HYDROCHLORIDE 200 MG/1
200 TABLET, FILM COATED ORAL 3 TIMES DAILY PRN
Qty: 9 TABLET | Refills: 0 | Status: SHIPPED | OUTPATIENT
Start: 2024-08-28 | End: 2024-08-31

## 2024-08-28 ASSESSMENT — ENCOUNTER SYMPTOMS
ABDOMINAL PAIN: 0
BACK PAIN: 0
VOMITING: 0
NAUSEA: 0
DIARRHEA: 0

## 2024-08-28 NOTE — PROGRESS NOTES
VIRTUAL VISIT    Patient seen via virtual visit today for the following:  Chief Complaint   Patient presents with    Urinary Tract Infection       HISTORY OF PRESENT ILLNESS     UTI NOTE    Duration or Onset of Symptoms: 4-5 days    Positive Symptoms: burning, frequency w/ voiding small amounts, urgency w/ incontinence last night    Negative Symptoms: no abdominal/flank/back pain, nausea, vomiting, hematuria, fevers, chills, sweats, discharge    Remedies Tried: Increased water intake, Cranberry tabs x 4 days, Azo since last night    History of Recurrent UTI's: usually about once a year    Recent UTI History: Last culture + UTI 24 successfully treated w/ Augmentin    Prior Successful Antibiotic Treatment: Cipro works best of all but Ceftin and Augmentin work also    History of Pyelonephritis: Once several years ago    History of  Procedures or Previous Urologic Evaluation: Urology work up of microhematuria in 2014 beingn    LMP: None, 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     Caffeine Usage:  1 Diet Mountain Dew qam, 1 Diet Coke in afternoon, occ coffee, no tea    Social History     Tobacco Use   Smoking Status Former    Current packs/day: 5.00    Average packs/day: 5.0 packs/day for 4.7 years (23.3 ttl pk-yrs)    Types: Cigarettes    Start date:     Quit date: 10/25/1991    Passive exposure: Past   Smokeless Tobacco Never     Social History     Substance and Sexual Activity   Alcohol Use Not Currently    Comment: very rare     OB History          0    Para   0    Term   0       0    AB   0    Living   0         SAB   0    IAB   0    Ectopic   0    Molar   0    Multiple   0    Live Births   0                 REVIEW OF SYMPTOMS   Review of Systems   Constitutional: Negative.  Negative for chills and fever.   Gastrointestinal:  Negative for abdominal pain, diarrhea, nausea and vomiting.   Genitourinary:  Positive for dysuria, frequency and urgency. Negative for flank pain and hematuria.

## 2024-08-29 LAB
BACTERIA SPEC CULT: NORMAL
SERVICE CMNT-IMP: NORMAL

## 2024-09-16 RX ORDER — ATORVASTATIN CALCIUM 20 MG/1
TABLET, FILM COATED ORAL
Qty: 90 TABLET | Refills: 3 | Status: SHIPPED | OUTPATIENT
Start: 2024-09-16

## 2024-09-30 DIAGNOSIS — I10 ESSENTIAL (PRIMARY) HYPERTENSION: ICD-10-CM

## 2024-10-02 RX ORDER — SPIRONOLACTONE 50 MG/1
50 TABLET, FILM COATED ORAL DAILY
Qty: 90 TABLET | Refills: 1 | Status: SHIPPED | OUTPATIENT
Start: 2024-10-02

## 2024-10-15 DIAGNOSIS — F41.1 GENERALIZED ANXIETY DISORDER: Primary | ICD-10-CM

## 2024-10-15 DIAGNOSIS — F51.05 INSOMNIA DUE TO OTHER MENTAL DISORDER (CODE): ICD-10-CM

## 2024-10-15 DIAGNOSIS — F51.05 HYPOSOMNIA, INSOMNIA OR SLEEPLESSNESS ASSOCIATED WITH ANXIETY: ICD-10-CM

## 2024-10-15 DIAGNOSIS — F41.9 HYPOSOMNIA, INSOMNIA OR SLEEPLESSNESS ASSOCIATED WITH ANXIETY: ICD-10-CM

## 2024-10-15 RX ORDER — ALPRAZOLAM 0.5 MG
TABLET ORAL
Qty: 15 TABLET | Refills: 3 | Status: SHIPPED | OUTPATIENT
Start: 2024-10-15 | End: 2024-11-14

## 2024-10-17 RX ORDER — PROPRANOLOL HYDROCHLORIDE 80 MG/1
80 CAPSULE, EXTENDED RELEASE ORAL DAILY
Qty: 90 CAPSULE | Refills: 3 | OUTPATIENT
Start: 2024-10-17

## 2024-10-24 NOTE — TELEPHONE ENCOUNTER
Last appointment: 8/21/24 MD [facility]  Next appointment: 2/18/25 MD [facility]  Previous refill encounter(s): 11/2/23 135 + 3    Requested Prescriptions     Pending Prescriptions Disp Refills    sertraline (ZOLOFT) 50 MG tablet [Pharmacy Med Name: SERTRALINE HCL TABS 50MG] 135 tablet 3     Sig: Take 1.5 tablets by mouth daily     For Pharmacy Admin Tracking Only    Program: Medication Refill  CPA in place:    Recommendation Provided To:   Intervention Detail: New Rx: 1, reason: Patient Preference  Intervention Accepted By:   Gap Closed?:    Time Spent (min): 5

## 2024-10-28 DIAGNOSIS — N30.00 ACUTE CYSTITIS WITHOUT HEMATURIA: ICD-10-CM

## 2024-10-28 RX ORDER — CIPROFLOXACIN 250 MG/1
250 TABLET, FILM COATED ORAL 2 TIMES DAILY
Qty: 6 TABLET | Refills: 0 | Status: SHIPPED | OUTPATIENT
Start: 2024-10-28 | End: 2024-10-31

## 2024-11-04 ENCOUNTER — HOSPITAL ENCOUNTER (OUTPATIENT)
Facility: HOSPITAL | Age: 54
Discharge: HOME | End: 2024-11-07
Payer: COMMERCIAL

## 2024-11-04 VITALS — HEIGHT: 68 IN | WEIGHT: 225 LBS | BODY MASS INDEX: 34.1 KG/M2

## 2024-11-04 DIAGNOSIS — Z12.31 SCREENING MAMMOGRAM FOR BREAST CANCER: ICD-10-CM

## 2024-11-04 PROCEDURE — 77063 BREAST TOMOSYNTHESIS BI: CPT

## 2024-11-11 ENCOUNTER — TELEPHONE (OUTPATIENT)
Age: 54
End: 2024-11-11
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
## 2024-11-18 DIAGNOSIS — I10 BENIGN ESSENTIAL HYPERTENSION: ICD-10-CM

## 2024-11-18 NOTE — TELEPHONE ENCOUNTER
PCP: Jackelyn Malhotra MD    Last appt: 8/28/2024     Future Appointments   Date Time Provider Department Center   11/19/2024  1:20 PM Jackelyn Malhotra MD [facility] DEP   12/6/2024  8:00 AM Linette Parks DO [facility] AMB   2/18/2025  8:00 AM Jackelyn Malhotra MD [facility] DEP   3/11/2025  7:40 AM Theresa Lundberg PA [facility]       Requested Prescriptions     Pending Prescriptions Disp Refills    amLODIPine (NORVASC) 5 MG tablet [Pharmacy Med Name: AMLODIPINE BESYLATE TABS 5MG] 90 tablet 3     Sig: TAKE 1 TABLET DAILY         Prior labs and Blood pressures:  BP Readings from Last 3 Encounters:   08/21/24 110/80   05/08/24 128/70   03/12/24 130/85     Lab Results   Component Value Date/Time     08/21/2024 09:04 AM    K 4.8 08/21/2024 09:04 AM     08/21/2024 09:04 AM    CO2 25 08/21/2024 09:04 AM    BUN 15 08/21/2024 09:04 AM    GFRAA >60 07/22/2022 07:30 AM     Lab Results   Component Value Date/Time    CHOL 175 08/21/2024 09:04 AM    HDL 41 08/21/2024 09:04 AM    LDL 77.6 08/21/2024 09:04 AM    LDL 84.4 07/17/2023 09:48 AM    VLDL 56.4 08/21/2024 09:04 AM     Lab Results   Component Value Date/Time    TSH 3.23 08/21/2024 09:04 AM

## 2024-11-19 ENCOUNTER — OFFICE VISIT (OUTPATIENT)
Age: 54
End: 2024-11-19

## 2024-11-19 VITALS
BODY MASS INDEX: 31.74 KG/M2 | HEART RATE: 69 BPM | SYSTOLIC BLOOD PRESSURE: 122 MMHG | TEMPERATURE: 98.2 F | OXYGEN SATURATION: 98 % | WEIGHT: 209.4 LBS | DIASTOLIC BLOOD PRESSURE: 80 MMHG | RESPIRATION RATE: 16 BRPM | HEIGHT: 68 IN
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
## 2024-11-19 DIAGNOSIS — M62.838 MUSCLE SPASMS OF NECK: ICD-10-CM

## 2024-11-19 DIAGNOSIS — S16.1XXA CERVICAL MYOFASCIAL STRAIN, INITIAL ENCOUNTER: ICD-10-CM

## 2024-11-19 DIAGNOSIS — S49.92XA INJURY OF LEFT SHOULDER, INITIAL ENCOUNTER: ICD-10-CM

## 2024-11-19 RX ORDER — AMLODIPINE BESYLATE 5 MG/1
TABLET ORAL
Qty: 90 TABLET | Refills: 1 | Status: SHIPPED | OUTPATIENT
Start: 2024-11-19

## 2024-11-19 ASSESSMENT — ENCOUNTER SYMPTOMS
RESPIRATORY NEGATIVE: 1
EYES NEGATIVE: 1
GASTROINTESTINAL NEGATIVE: 1
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
## 2024-12-04 RX ORDER — LEVONORGESTREL / ETHINYL ESTRADIOL 0.15-0.03
1 KIT ORAL DAILY
Qty: 3 PACKET | Refills: 3 | Status: SHIPPED | OUTPATIENT
Start: 2024-12-04

## 2024-12-04 NOTE — TELEPHONE ENCOUNTER
PCP: Jackelyn Malhotra MD    Last appt: 11/19/2024     Future Appointments   Date Time Provider Department Center   12/6/2024  8:00 AM Linette Parks DO [facility] AMB   2/18/2025  8:00 AM [facility]   3/11/2025  7:40 AM Theresa Lundberg PA [facility] BS AMB       Requested Prescriptions     Pending Prescriptions Disp Refills    JOLESSA 0.15-0.03 MG per tablet [Pharmacy Med Name: L-NORGES/ETH ES(JOLESSA)TB 91 0.15MG/30MCG] 91 tablet 3     Sig: TAKE 1 TABLET DAILY       Prior labs and Blood pressures:  BP Readings from Last 3 Encounters:   11/19/24 122/80   08/21/24 110/80   05/08/24 128/70     Lab Results   Component Value Date/Time     08/21/2024 09:04 AM    K 4.8 08/21/2024 09:04 AM     08/21/2024 09:04 AM    CO2 25 08/21/2024 09:04 AM    BUN 15 08/21/2024 09:04 AM    GFRAA >60 07/22/2022 07:30 AM     No results found for: \"HBA1C\", \"PFI5YDIM\"  Lab Results   Component Value Date/Time    CHOL 175 08/21/2024 09:04 AM    HDL 41 08/21/2024 09:04 AM    LDL 77.6 08/21/2024 09:04 AM    LDL 84.4 07/17/2023 09:48 AM    VLDL 56.4 08/21/2024 09:04 AM     No results found for: \"VITD3\"    Lab Results   Component Value Date/Time    TSH 3.23 08/21/2024 09:04 AM

## 2024-12-06 ENCOUNTER — TELEPHONE (OUTPATIENT)
Age: 54
End: 2024-12-06

## 2024-12-06 ENCOUNTER — OFFICE VISIT (OUTPATIENT)
Age: 54
End: 2024-12-06
Payer: COMMERCIAL

## 2024-12-06 VITALS
HEIGHT: 68 IN | BODY MASS INDEX: 30.31 KG/M2 | SYSTOLIC BLOOD PRESSURE: 138 MMHG | DIASTOLIC BLOOD PRESSURE: 76 MMHG | OXYGEN SATURATION: 96 % | WEIGHT: 200 LBS | HEART RATE: 78 BPM

## 2024-12-06 DIAGNOSIS — G43.009 MIGRAINE WITHOUT AURA, NOT INTRACTABLE, WITHOUT STATUS MIGRAINOSUS: ICD-10-CM

## 2024-12-06 DIAGNOSIS — G25.0 ESSENTIAL TREMOR: Primary | ICD-10-CM

## 2024-12-06 DIAGNOSIS — F07.81 POST CONCUSSION SYNDROME: ICD-10-CM

## 2024-12-06 PROCEDURE — 99214 OFFICE O/P EST MOD 30 MIN: CPT | Performed by: PSYCHIATRY & NEUROLOGY

## 2024-12-06 PROCEDURE — 3075F SYST BP GE 130 - 139MM HG: CPT | Performed by: PSYCHIATRY & NEUROLOGY

## 2024-12-06 PROCEDURE — 3078F DIAST BP <80 MM HG: CPT | Performed by: PSYCHIATRY & NEUROLOGY

## 2024-12-06 RX ORDER — PROPRANOLOL HCL 20 MG
20 TABLET ORAL PRN
Qty: 90 TABLET | Refills: 0 | Status: SHIPPED | OUTPATIENT
Start: 2024-12-06

## 2024-12-06 RX ORDER — PROPRANOLOL HYDROCHLORIDE 80 MG/1
80 CAPSULE, EXTENDED RELEASE ORAL DAILY
Qty: 90 CAPSULE | Refills: 1 | Status: SHIPPED | OUTPATIENT
Start: 2024-12-06
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
## 2025-01-13 RX ORDER — PROPRANOLOL HCL 20 MG
20 TABLET ORAL 3 TIMES DAILY
Qty: 90 TABLET | Refills: 5 | OUTPATIENT
Start: 2025-01-13

## 2025-01-22 RX ORDER — FLUTICASONE PROPIONATE AND SALMETEROL 100; 50 UG/1; UG/1
POWDER RESPIRATORY (INHALATION)
Qty: 3 EACH | Refills: 3 | Status: SHIPPED | OUTPATIENT
Start: 2025-01-22

## 2025-01-22 NOTE — TELEPHONE ENCOUNTER
PCP: Jackelyn Malhotra MD    Last appt: 11/19/2024       Future Appointments   Date Time Provider Department Center   2/18/2025  8:00 AM [facility]   3/11/2025  7:40 AM Theresa Lundberg PA [facility] AMB   7/18/2025  9:00 AM Linette Parks, DO [facility] AMB       Requested Prescriptions     Pending Prescriptions Disp Refills    fluticasone-salmeterol (ADVAIR) 100-50 MCG/ACT AEPB diskus inhaler [Pharmacy Med Name: FLUTICASONE/SALMETEROL DISKUS 60'S 100/50]  3     Sig: USE 1 INHALATION EVERY 12 HOURS       Prior labs and Blood pressures:  BP Readings from Last 3 Encounters:   12/06/24 138/76   11/19/24 122/80   08/21/24 110/80     Lab Results   Component Value Date/Time     08/21/2024 09:04 AM    K 4.8 08/21/2024 09:04 AM     08/21/2024 09:04 AM    CO2 25 08/21/2024 09:04 AM    BUN 15 08/21/2024 09:04 AM    GFRAA >60 07/22/2022 07:30 AM     No results found for: \"HBA1C\", \"NXA0INKC\"  Lab Results   Component Value Date/Time    CHOL 175 08/21/2024 09:04 AM    HDL 41 08/21/2024 09:04 AM    LDL 77.6 08/21/2024 09:04 AM    LDL 84.4 07/17/2023 09:48 AM    VLDL 56.4 08/21/2024 09:04 AM     No results found for: \"VITD3\"    Lab Results   Component Value Date/Time    TSH 3.23 08/21/2024 09:04 AM

## 2025-02-14 DIAGNOSIS — F41.9 HYPOSOMNIA, INSOMNIA OR SLEEPLESSNESS ASSOCIATED WITH ANXIETY: ICD-10-CM

## 2025-02-14 DIAGNOSIS — F51.05 HYPOSOMNIA, INSOMNIA OR SLEEPLESSNESS ASSOCIATED WITH ANXIETY: ICD-10-CM

## 2025-02-14 DIAGNOSIS — F41.1 GENERALIZED ANXIETY DISORDER: ICD-10-CM

## 2025-02-15 SDOH — ECONOMIC STABILITY: INCOME INSECURITY: IN THE LAST 12 MONTHS, WAS THERE A TIME WHEN YOU WERE NOT ABLE TO PAY THE MORTGAGE OR RENT ON TIME?: NO

## 2025-02-15 SDOH — ECONOMIC STABILITY: FOOD INSECURITY: WITHIN THE PAST 12 MONTHS, THE FOOD YOU BOUGHT JUST DIDN'T LAST AND YOU DIDN'T HAVE MONEY TO GET MORE.: NEVER TRUE

## 2025-02-15 SDOH — ECONOMIC STABILITY: FOOD INSECURITY: WITHIN THE PAST 12 MONTHS, YOU WORRIED THAT YOUR FOOD WOULD RUN OUT BEFORE YOU GOT MONEY TO BUY MORE.: NEVER TRUE

## 2025-02-15 SDOH — ECONOMIC STABILITY: TRANSPORTATION INSECURITY
IN THE PAST 12 MONTHS, HAS THE LACK OF TRANSPORTATION KEPT YOU FROM MEDICAL APPOINTMENTS OR FROM GETTING MEDICATIONS?: NO

## 2025-02-15 SDOH — ECONOMIC STABILITY: TRANSPORTATION INSECURITY
IN THE PAST 12 MONTHS, HAS LACK OF TRANSPORTATION KEPT YOU FROM MEETINGS, WORK, OR FROM GETTING THINGS NEEDED FOR DAILY LIVING?: NO

## 2025-02-16 RX ORDER — ALPRAZOLAM 0.5 MG
TABLET ORAL
Qty: 15 TABLET | Refills: 5 | Status: SHIPPED | OUTPATIENT
Start: 2025-02-16 | End: 2025-03-18

## 2025-02-17 ENCOUNTER — OFFICE VISIT (OUTPATIENT)
Age: 55
End: 2025-02-17
Payer: COMMERCIAL

## 2025-02-17 VITALS
TEMPERATURE: 98 F | BODY MASS INDEX: 28.61 KG/M2 | DIASTOLIC BLOOD PRESSURE: 82 MMHG | OXYGEN SATURATION: 98 % | RESPIRATION RATE: 16 BRPM | WEIGHT: 188.8 LBS | HEART RATE: 74 BPM | SYSTOLIC BLOOD PRESSURE: 128 MMHG | HEIGHT: 68 IN

## 2025-02-17 DIAGNOSIS — F41.9 HYPOSOMNIA, INSOMNIA OR SLEEPLESSNESS ASSOCIATED WITH ANXIETY: ICD-10-CM

## 2025-02-17 DIAGNOSIS — F41.1 GENERALIZED ANXIETY DISORDER: Primary | ICD-10-CM

## 2025-02-17 DIAGNOSIS — Z51.81 ENCOUNTER FOR MEDICATION MONITORING: ICD-10-CM

## 2025-02-17 DIAGNOSIS — F51.05 HYPOSOMNIA, INSOMNIA OR SLEEPLESSNESS ASSOCIATED WITH ANXIETY: ICD-10-CM

## 2025-02-17 PROCEDURE — 3079F DIAST BP 80-89 MM HG: CPT | Performed by: FAMILY MEDICINE

## 2025-02-17 PROCEDURE — 99213 OFFICE O/P EST LOW 20 MIN: CPT | Performed by: FAMILY MEDICINE

## 2025-02-17 PROCEDURE — 3074F SYST BP LT 130 MM HG: CPT | Performed by: FAMILY MEDICINE

## 2025-02-17 ASSESSMENT — PATIENT HEALTH QUESTIONNAIRE - PHQ9
SUM OF ALL RESPONSES TO PHQ QUESTIONS 1-9: 0
SUM OF ALL RESPONSES TO PHQ QUESTIONS 1-9: 0
SUM OF ALL RESPONSES TO PHQ9 QUESTIONS 1 & 2: 0
8. MOVING OR SPEAKING SO SLOWLY THAT OTHER PEOPLE COULD HAVE NOTICED. OR THE OPPOSITE, BEING SO FIGETY OR RESTLESS THAT YOU HAVE BEEN MOVING AROUND A LOT MORE THAN USUAL: NOT AT ALL
3. TROUBLE FALLING OR STAYING ASLEEP: NOT AT ALL
5. POOR APPETITE OR OVEREATING: NOT AT ALL
1. LITTLE INTEREST OR PLEASURE IN DOING THINGS: NOT AT ALL
10. IF YOU CHECKED OFF ANY PROBLEMS, HOW DIFFICULT HAVE THESE PROBLEMS MADE IT FOR YOU TO DO YOUR WORK, TAKE CARE OF THINGS AT HOME, OR GET ALONG WITH OTHER PEOPLE: NOT DIFFICULT AT ALL
SUM OF ALL RESPONSES TO PHQ QUESTIONS 1-9: 0
7. TROUBLE CONCENTRATING ON THINGS, SUCH AS READING THE NEWSPAPER OR WATCHING TELEVISION: NOT AT ALL
6. FEELING BAD ABOUT YOURSELF - OR THAT YOU ARE A FAILURE OR HAVE LET YOURSELF OR YOUR FAMILY DOWN: NOT AT ALL
SUM OF ALL RESPONSES TO PHQ QUESTIONS 1-9: 0
9. THOUGHTS THAT YOU WOULD BE BETTER OFF DEAD, OR OF HURTING YOURSELF: NOT AT ALL
2. FEELING DOWN, DEPRESSED OR HOPELESS: NOT AT ALL
4. FEELING TIRED OR HAVING LITTLE ENERGY: NOT AT ALL

## 2025-02-17 ASSESSMENT — ENCOUNTER SYMPTOMS
GASTROINTESTINAL NEGATIVE: 1
RESPIRATORY NEGATIVE: 1

## 2025-02-17 NOTE — PROGRESS NOTES
Chief Complaint   Patient presents with    Medication Check     1. \"Have you been to the ER, urgent care clinic since your last visit?  Hospitalized since your last visit?\" No    2. \"Have you seen or consulted any other health care providers outside of the [facility] since your last visit?\" Neuro Dr Parks, [facility] [place] [facility]    3. For patients aged 45-75: Has the patient had a colonoscopy / FIT/ Cologuard? Yes - no Care Gap present 6/2022 Tubular Adenoma, Repeat 5 yrs; Dr. Stoll       If the patient is female:     4. For patients aged 40-74: Has the patient had a mammogram within the past 2 years? Yes - no Care Gap present 11/2024      5. For patients aged 21-65: Has the patient had a pap smear? 12/2020 due 10/2025      
    1. Generalized Anxiety Disorder, Panic Attacks, Depression  Controlled, stable on regimen of Zoloft 75 mg daily and Xanax 0.5 mg 1/2 tablet nightly     2. Hyposomnia, insomnia or sleeplessness associated with anxiety  Controlled, stable on regimen of Zoloft 75 mg daily and Xanax 0.5 mg 1/2 tablet nightly     3. Encounter for medication monitoring  Anxiety, sleep health and controlled medication mgt counseling.  pulled and reviewed. No problems noted. No concerns re: Xanax usage. Low abuse/misuse potential. Rx renewed today as ordered. Patient counseled and we discussed controlled medications, effects, side effects, indications, risks vs benefits, precautions, potential interactions/dangers w/ other medications, illicit drugs, potential for abuse/addiction/dependency/tolerance/withdrawal and the possible negative health implications/risks associated w/ overuse/abuse/misuse of controlled pain, stimulant or sedating medications including overdose and death. Patient expressed understanding and agreement with current plan of care. UDS and CSA updated and on file 8/2024.    RTC 8/2025 for next medication check, annual CPE and complete fasting labs                                
Azelaic Acid Counseling: Patient counseled that medicine may cause skin irritation and to avoid applying near the eyes.  In the event of skin irritation, the patient was advised to reduce the amount of the drug applied or use it less frequently.   The patient verbalized understanding of the proper use and possible adverse effects of azelaic acid.  All of the patient's questions and concerns were addressed.

## 2025-03-11 ENCOUNTER — OFFICE VISIT (OUTPATIENT)
Age: 55
End: 2025-03-11
Payer: COMMERCIAL

## 2025-03-11 VITALS
BODY MASS INDEX: 27.46 KG/M2 | SYSTOLIC BLOOD PRESSURE: 121 MMHG | OXYGEN SATURATION: 98 % | WEIGHT: 181.2 LBS | HEART RATE: 79 BPM | DIASTOLIC BLOOD PRESSURE: 82 MMHG | HEIGHT: 68 IN | TEMPERATURE: 97.7 F

## 2025-03-11 DIAGNOSIS — Z13.220 SCREENING FOR HYPERLIPIDEMIA: ICD-10-CM

## 2025-03-11 DIAGNOSIS — Z13.1 SCREENING FOR DIABETES MELLITUS: ICD-10-CM

## 2025-03-11 DIAGNOSIS — K76.0 FATTY (CHANGE OF) LIVER, NOT ELSEWHERE CLASSIFIED: Primary | ICD-10-CM

## 2025-03-11 LAB
BASOPHILS # BLD AUTO: 0.1 X10E3/UL (ref 0–0.2)
BASOPHILS NFR BLD AUTO: 1 %
EOSINOPHIL # BLD AUTO: 0.2 X10E3/UL (ref 0–0.4)
EOSINOPHIL NFR BLD AUTO: 2 %
ERYTHROCYTE [DISTWIDTH] IN BLOOD BY AUTOMATED COUNT: 12.7 % (ref 11.7–15.4)
HBA1C MFR BLD: 5.7 % (ref 4.8–5.6)
HCT VFR BLD AUTO: 43.6 % (ref 34–46.6)
HGB BLD-MCNC: 14.1 G/DL (ref 11.1–15.9)
IMM GRANULOCYTES # BLD AUTO: 0 X10E3/UL (ref 0–0.1)
IMM GRANULOCYTES NFR BLD AUTO: 0 %
LYMPHOCYTES # BLD AUTO: 3.8 X10E3/UL (ref 0.7–3.1)
LYMPHOCYTES NFR BLD AUTO: 37 %
MCH RBC QN AUTO: 30.5 PG (ref 26.6–33)
MCHC RBC AUTO-ENTMCNC: 32.3 G/DL (ref 31.5–35.7)
MCV RBC AUTO: 94 FL (ref 79–97)
MONOCYTES # BLD AUTO: 0.4 X10E3/UL (ref 0.1–0.9)
MONOCYTES NFR BLD AUTO: 4 %
NEUTROPHILS # BLD AUTO: 5.7 X10E3/UL (ref 1.4–7)
NEUTROPHILS NFR BLD AUTO: 56 %
PLATELET # BLD AUTO: 557 X10E3/UL (ref 150–450)
RBC # BLD AUTO: 4.62 X10E6/UL (ref 3.77–5.28)
WBC # BLD AUTO: 10.1 X10E3/UL (ref 3.4–10.8)

## 2025-03-11 PROCEDURE — 99214 OFFICE O/P EST MOD 30 MIN: CPT | Performed by: PHYSICIAN ASSISTANT

## 2025-03-11 PROCEDURE — 3074F SYST BP LT 130 MM HG: CPT | Performed by: PHYSICIAN ASSISTANT

## 2025-03-11 PROCEDURE — 3079F DIAST BP 80-89 MM HG: CPT | Performed by: PHYSICIAN ASSISTANT

## 2025-03-11 PROCEDURE — 91200 LIVER ELASTOGRAPHY: CPT | Performed by: PHYSICIAN ASSISTANT

## 2025-03-11 ASSESSMENT — PATIENT HEALTH QUESTIONNAIRE - PHQ9
6. FEELING BAD ABOUT YOURSELF - OR THAT YOU ARE A FAILURE OR HAVE LET YOURSELF OR YOUR FAMILY DOWN: NOT AT ALL
8. MOVING OR SPEAKING SO SLOWLY THAT OTHER PEOPLE COULD HAVE NOTICED. OR THE OPPOSITE, BEING SO FIGETY OR RESTLESS THAT YOU HAVE BEEN MOVING AROUND A LOT MORE THAN USUAL: NOT AT ALL
SUM OF ALL RESPONSES TO PHQ QUESTIONS 1-9: 0
DEPRESSION UNABLE TO ASSESS: FUNCTIONAL CAPACITY MOTIVATION LIMITS ACCURACY
SUM OF ALL RESPONSES TO PHQ QUESTIONS 1-9: 0
2. FEELING DOWN, DEPRESSED OR HOPELESS: NOT AT ALL
4. FEELING TIRED OR HAVING LITTLE ENERGY: NOT AT ALL
9. THOUGHTS THAT YOU WOULD BE BETTER OFF DEAD, OR OF HURTING YOURSELF: NOT AT ALL
5. POOR APPETITE OR OVEREATING: NOT AT ALL
SUM OF ALL RESPONSES TO PHQ QUESTIONS 1-9: 0
10. IF YOU CHECKED OFF ANY PROBLEMS, HOW DIFFICULT HAVE THESE PROBLEMS MADE IT FOR YOU TO DO YOUR WORK, TAKE CARE OF THINGS AT HOME, OR GET ALONG WITH OTHER PEOPLE: NOT DIFFICULT AT ALL
SUM OF ALL RESPONSES TO PHQ QUESTIONS 1-9: 0
1. LITTLE INTEREST OR PLEASURE IN DOING THINGS: NOT AT ALL
7. TROUBLE CONCENTRATING ON THINGS, SUCH AS READING THE NEWSPAPER OR WATCHING TELEVISION: NOT AT ALL
3. TROUBLE FALLING OR STAYING ASLEEP: NOT AT ALL

## 2025-03-11 ASSESSMENT — ANXIETY QUESTIONNAIRES
6. BECOMING EASILY ANNOYED OR IRRITABLE: NOT AT ALL
4. TROUBLE RELAXING: NOT AT ALL
GAD7 TOTAL SCORE: 0
5. BEING SO RESTLESS THAT IT IS HARD TO SIT STILL: NOT AT ALL
1. FEELING NERVOUS, ANXIOUS, OR ON EDGE: NOT AT ALL
2. NOT BEING ABLE TO STOP OR CONTROL WORRYING: NOT AT ALL
IF YOU CHECKED OFF ANY PROBLEMS ON THIS QUESTIONNAIRE, HOW DIFFICULT HAVE THESE PROBLEMS MADE IT FOR YOU TO DO YOUR WORK, TAKE CARE OF THINGS AT HOME, OR GET ALONG WITH OTHER PEOPLE: NOT DIFFICULT AT ALL
7. FEELING AFRAID AS IF SOMETHING AWFUL MIGHT HAPPEN: NOT AT ALL
3. WORRYING TOO MUCH ABOUT DIFFERENT THINGS: NOT AT ALL

## 2025-03-11 NOTE — PROGRESS NOTES
Chief Complaint   Patient presents with    Follow-up     Vitals:    03/11/25 0748   BP: 121/82   Pulse: 79   Temp: 97.7 °F (36.5 °C)   SpO2: 98%     \"Have you been to the ER, urgent care clinic since your last visit?  Hospitalized since your last visit?\"    NO    “Have you seen or consulted any other health care providers outside our system since your last visit?”    NO             
liver.  Changes consistent with fatty liver.  Enhancing liver mass with washout and no rim enhancement on delayed images in the right lobe measuring 4.7 cm.  Most consistent with focal nodular hyperplasia.  11/2015.  Dynamic MRI of liver.  Resolution of all fatty liver except for small area of hypodensity in segment 5.  The previously seen mass in the right lobe has now resolved and the area is consistent with normal appearing liver.  12/2021. CT abdomen.  Hepatic steatosis. Hypodensity of liver, not mass-like and unchanged in appearance from 2015.    OTHER TESTING:  Not available or performed    ASSESSMENT AND PLAN:  NAFL.  Suspected fatty liver disease supported by Fibroscan and imaging.     The previously seen mass in the right lobe which was thought to be a focal nodular hyperplasia in retrospect was thought to represent a focal fatty infiltraiton and has now resolved. She continues to have generalized steatosis suggested on the 12/2021 CT scan and may continue to have area of focal fatty sparing.     There has been no suggestion of advanced fibrosis on Fibroscan and she is very encouraged to see the CAP score is down with her intentional, significant weight losses.  I have discussed with her that we could releasee her from follow-up as this point, but she prefers to be seen back in at least one more year to follow her labs and maintain her focus on weight reduction/dietary changes. We plan on repeating this study in 12 months for verification of pro(re)gression, 3/2026.     I have asked her to continue her efforts to focus on weight reduction and healthy eating habits.  I have congratulated her on her weight reduction thus far and she is encouraged by the tangible reduction in liver enzymes and CAP score.     Lab results from 8/2024 reviewed. Liver transaminases are normal.  Alkaline phosphate is normal.  Liver function is normal.   We have drawn repeat labs to monitor liver function, A1c, and lipid panel as

## 2025-03-12 ENCOUNTER — RESULTS FOLLOW-UP (OUTPATIENT)
Age: 55
End: 2025-03-12

## 2025-03-12 LAB
ALBUMIN SERPL-MCNC: 4.5 G/DL (ref 3.8–4.9)
ALP SERPL-CCNC: 76 IU/L (ref 44–121)
ALT SERPL-CCNC: 20 IU/L (ref 0–32)
AST SERPL-CCNC: 20 IU/L (ref 0–40)
BILIRUB DIRECT SERPL-MCNC: 0.11 MG/DL (ref 0–0.4)
BILIRUB SERPL-MCNC: 0.3 MG/DL (ref 0–1.2)
BUN SERPL-MCNC: 16 MG/DL (ref 6–24)
BUN/CREAT SERPL: 18 (ref 9–23)
CALCIUM SERPL-MCNC: 10.1 MG/DL (ref 8.7–10.2)
CHLORIDE SERPL-SCNC: 104 MMOL/L (ref 96–106)
CHOLEST SERPL-MCNC: 156 MG/DL (ref 100–199)
CO2 SERPL-SCNC: 20 MMOL/L (ref 20–29)
CREAT SERPL-MCNC: 0.89 MG/DL (ref 0.57–1)
EGFRCR SERPLBLD CKD-EPI 2021: 77 ML/MIN/1.73
GLUCOSE SERPL-MCNC: 100 MG/DL (ref 70–99)
HDLC SERPL-MCNC: 40 MG/DL
LDLC SERPL CALC-MCNC: 87 MG/DL (ref 0–99)
POTASSIUM SERPL-SCNC: 5.1 MMOL/L (ref 3.5–5.2)
PROT SERPL-MCNC: 6.6 G/DL (ref 6–8.5)
SODIUM SERPL-SCNC: 139 MMOL/L (ref 134–144)
TRIGL SERPL-MCNC: 167 MG/DL (ref 0–149)
VLDLC SERPL CALC-MCNC: 29 MG/DL (ref 5–40)

## 2025-03-12 NOTE — RESULT ENCOUNTER NOTE
Pt notified via [facility] message of stable findings and general improving trend on lipid panel and liver enzymes with significant weight losses. Will forward copy to PCP as well.

## 2025-05-15 DIAGNOSIS — I10 BENIGN ESSENTIAL HYPERTENSION: ICD-10-CM

## 2025-05-15 DIAGNOSIS — G25.0 ESSENTIAL TREMOR: Primary | ICD-10-CM

## 2025-05-15 RX ORDER — PROPRANOLOL HYDROCHLORIDE 80 MG/1
80 CAPSULE, EXTENDED RELEASE ORAL DAILY
Qty: 90 CAPSULE | Refills: 0 | Status: SHIPPED | OUTPATIENT
Start: 2025-05-15

## 2025-05-16 ENCOUNTER — TELEPHONE (OUTPATIENT)
Age: 55
End: 2025-05-16

## 2025-05-16 NOTE — TELEPHONE ENCOUNTER
PCP: Jackelyn Malhotra MD    Last appt: 2/17/2025     Future Appointments   Date Time Provider Department Center   7/18/2025  9:00 AM Linette Parks DO [facility] AMB   8/18/2025  8:40 AM Jack[facility]hotra MD [facility]   3/10/2026  8:20 AM Theresa Lundberg PA [facility] BS AMB       Requested Prescriptions     Pending Prescriptions Disp Refills    amLODIPine (NORVASC) 5 MG tablet [Pharmacy Med Name: AMLODIPINE BESYLATE TABS 5MG] 90 tablet 3     Sig: TAKE 1 TABLET DAILY       Prior labs and Blood pressures:  BP Readings from Last 3 Encounters:   03/11/25 121/82   02/17/25 128/82   12/06/24 138/76     Lab Results   Component Value Date/Time     03/11/2025 08:41 AM    K 5.1 03/11/2025 08:41 AM     03/11/2025 08:41 AM    CO2 20 03/11/2025 08:41 AM    BUN 16 03/11/2025 08:41 AM    GFRAA >60 07/22/2022 07:30 AM     No results found for: \"HBA1C\", \"KMT9DCDV\"  Lab Results   Component Value Date/Time    CHOL 156 03/11/2025 08:41 AM    HDL 40 03/11/2025 08:41 AM    LDL 87 03/11/2025 08:41 AM    LDL 84.4 07/17/2023 09:48 AM    VLDL 29 03/11/2025 08:41 AM     No results found for: \"VITD3\"    Lab Results   Component Value Date/Time    TSH 3.23 08/21/2024 09:04 AM

## 2025-05-16 NOTE — TELEPHONE ENCOUNTER
Called patient. Asked if this she wanted another referral to [facility] or is it paperwork we need to sign off on. Patient reported that she would like another referral to [facility] for cognitive speech therapy?

## 2025-05-16 NOTE — TELEPHONE ENCOUNTER
Patient checking states on requested referral from [facility]. Document has been scanned in  5/12/25. Patient is requesting for referral to be electrically signed by doctor Parks and sent back to [facility].  Patient would like a phone call once referral has been sent.

## 2025-05-18 RX ORDER — AMLODIPINE BESYLATE 5 MG/1
5 TABLET ORAL DAILY
Qty: 90 TABLET | Refills: 3 | Status: SHIPPED | OUTPATIENT
Start: 2025-05-18

## 2025-05-19 ENCOUNTER — TELEPHONE (OUTPATIENT)
Age: 55
End: 2025-05-19

## 2025-05-19 DIAGNOSIS — F07.81 POST CONCUSSION SYNDROME: Primary | ICD-10-CM

## 2025-05-19 NOTE — TELEPHONE ENCOUNTER
Called patient. No answer. Left a VM stating it is ordered and I have faxed it over to [facility].

## 2025-05-19 NOTE — TELEPHONE ENCOUNTER
Patient would like a call back to let her know if Dr. Parks agrees with her having speech and cognitive therapy at [facility].    Patient states information regarding this has been faxed to our office.

## 2025-06-15 DIAGNOSIS — G25.0 ESSENTIAL TREMOR: ICD-10-CM

## 2025-06-16 DIAGNOSIS — G25.0 ESSENTIAL TREMOR: ICD-10-CM

## 2025-06-16 RX ORDER — PROPRANOLOL HYDROCHLORIDE 80 MG/1
80 CAPSULE, EXTENDED RELEASE ORAL DAILY
Qty: 90 CAPSULE | Refills: 3 | OUTPATIENT
Start: 2025-06-16

## 2025-06-16 RX ORDER — PROPRANOLOL HYDROCHLORIDE 80 MG/1
80 CAPSULE, EXTENDED RELEASE ORAL DAILY
Qty: 90 CAPSULE | Refills: 0 | Status: SHIPPED | OUTPATIENT
Start: 2025-06-16

## 2025-06-17 RX ORDER — PROPRANOLOL HCL 20 MG
TABLET ORAL
Qty: 90 TABLET | Refills: 3 | OUTPATIENT
Start: 2025-06-17

## 2025-06-24 DIAGNOSIS — G25.0 ESSENTIAL TREMOR: ICD-10-CM

## 2025-06-24 RX ORDER — PROPRANOLOL HYDROCHLORIDE 80 MG/1
80 CAPSULE, EXTENDED RELEASE ORAL DAILY
Qty: 15 CAPSULE | Refills: 0 | Status: SHIPPED | OUTPATIENT
Start: 2025-06-24 | End: 2025-06-27 | Stop reason: SDUPTHER

## 2025-06-24 NOTE — TELEPHONE ENCOUNTER
Received message, \"Yes.  That is correct.  I am on the phone with an [employer] agent, the prescription will be released tomorrow.   The pharmacist at [facility] suggested I request a short supply from a local pharmacy of 7-10 days to allow for the arrival of the shipment.\"    Doctor stated, \"Yes, may send in 15 day supply.\"

## 2025-06-27 ENCOUNTER — TELEPHONE (OUTPATIENT)
Age: 55
End: 2025-06-27

## 2025-06-27 DIAGNOSIS — G25.0 ESSENTIAL TREMOR: ICD-10-CM

## 2025-06-27 RX ORDER — PROPRANOLOL HYDROCHLORIDE 80 MG/1
80 CAPSULE, EXTENDED RELEASE ORAL DAILY
Qty: 90 CAPSULE | Refills: 1 | Status: SHIPPED | OUTPATIENT
Start: 2025-06-27

## 2025-06-27 NOTE — TELEPHONE ENCOUNTER
When short supply was sent to local pharmacy the order at [facility] was discontinued so re sending [facility].

## 2025-07-11 DIAGNOSIS — I10 ESSENTIAL (PRIMARY) HYPERTENSION: ICD-10-CM

## 2025-07-11 NOTE — TELEPHONE ENCOUNTER
PCP: Jackelyn Malhotra MD    Last appt: 2/17/2025     Future Appointments   Date Time Provider Department Center   7/18/2025  9:00 AM Linette Parks DO [facility]   8/18/2025  8:40 AM Jackelyn Malhotra MD [facility]   3/10/2026  8:20 AM Theresa Lundberg PA [facility]       Requested Prescriptions     Pending Prescriptions Disp Refills    spironolactone (ALDACTONE) 50 MG tablet [Pharmacy Med Name: SPIRONOLACTONE TABS 50MG] 90 tablet 3     Sig: TAKE 1 TABLET DAILY       Prior labs and Blood pressures:  BP Readings from Last 3 Encounters:   03/11/25 121/82   02/17/25 128/82   12/06/24 138/76     Lab Results   Component Value Date/Time     03/11/2025 08:41 AM    K 5.1 03/11/2025 08:41 AM     03/11/2025 08:41 AM    CO2 20 03/11/2025 08:41 AM    BUN 16 03/11/2025 08:41 AM    GFRAA >60 07/22/2022 07:30 AM     No results found for: \"HBA1C\", \"[id number]\"  Lab Results   Component Value Date/Time    CHOL 156 03/11/2025 08:41 AM    HDL 40 03/11/2025 08:41 AM    LDL 87 03/11/2025 08:41 AM    LDL 84.4 07/17/2023 09:48 AM    VLDL 29 03/11/2025 08:41 AM     No results found for: \"VITD3\"    Lab Results   Component Value Date/Time    TSH 3.23 08/21/2024 09:04 AM

## 2025-07-12 RX ORDER — SPIRONOLACTONE 50 MG/1
50 TABLET, FILM COATED ORAL DAILY
Qty: 90 TABLET | Refills: 3 | Status: SHIPPED | OUTPATIENT
Start: 2025-07-12

## 2025-07-18 ENCOUNTER — OFFICE VISIT (OUTPATIENT)
Age: 55
End: 2025-07-18
Payer: COMMERCIAL

## 2025-07-18 VITALS
SYSTOLIC BLOOD PRESSURE: 116 MMHG | WEIGHT: 162.2 LBS | DIASTOLIC BLOOD PRESSURE: 80 MMHG | HEART RATE: 60 BPM | OXYGEN SATURATION: 98 % | BODY MASS INDEX: 24.58 KG/M2 | HEIGHT: 68 IN

## 2025-07-18 DIAGNOSIS — G25.0 ESSENTIAL TREMOR: Primary | ICD-10-CM

## 2025-07-18 DIAGNOSIS — G43.009 MIGRAINE WITHOUT AURA, NOT INTRACTABLE, WITHOUT STATUS MIGRAINOSUS: ICD-10-CM

## 2025-07-18 PROCEDURE — 99214 OFFICE O/P EST MOD 30 MIN: CPT | Performed by: PSYCHIATRY & NEUROLOGY

## 2025-07-18 PROCEDURE — 3079F DIAST BP 80-89 MM HG: CPT | Performed by: PSYCHIATRY & NEUROLOGY

## 2025-07-18 PROCEDURE — 3074F SYST BP LT 130 MM HG: CPT | Performed by: PSYCHIATRY & NEUROLOGY
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
## 2025-08-18 ENCOUNTER — OFFICE VISIT (OUTPATIENT)
Age: 55
End: 2025-08-18
Payer: COMMERCIAL

## 2025-08-18 VITALS
RESPIRATION RATE: 12 BRPM | HEIGHT: 68 IN | SYSTOLIC BLOOD PRESSURE: 110 MMHG | HEART RATE: 61 BPM | TEMPERATURE: 97.6 F | OXYGEN SATURATION: 99 % | BODY MASS INDEX: 24.55 KG/M2 | WEIGHT: 162 LBS | DIASTOLIC BLOOD PRESSURE: 80 MMHG

## 2025-08-18 DIAGNOSIS — J45.20 MILD INTERMITTENT ASTHMA WITHOUT COMPLICATION: ICD-10-CM

## 2025-08-18 DIAGNOSIS — M62.838 MUSCLE SPASMS OF NECK: ICD-10-CM

## 2025-08-18 DIAGNOSIS — Z79.899 CONTROLLED SUBSTANCE AGREEMENT SIGNED: ICD-10-CM

## 2025-08-18 DIAGNOSIS — F32.A CHRONIC DEPRESSION: ICD-10-CM

## 2025-08-18 DIAGNOSIS — F51.05 HYPOSOMNIA, INSOMNIA OR SLEEPLESSNESS ASSOCIATED WITH ANXIETY: ICD-10-CM

## 2025-08-18 DIAGNOSIS — F41.1 GENERALIZED ANXIETY DISORDER: Primary | ICD-10-CM

## 2025-08-18 DIAGNOSIS — Z51.81 ENCOUNTER FOR MEDICATION MONITORING: ICD-10-CM

## 2025-08-18 DIAGNOSIS — F41.9 HYPOSOMNIA, INSOMNIA OR SLEEPLESSNESS ASSOCIATED WITH ANXIETY: ICD-10-CM

## 2025-08-18 PROCEDURE — 99214 OFFICE O/P EST MOD 30 MIN: CPT | Performed by: FAMILY MEDICINE

## 2025-08-18 PROCEDURE — 3079F DIAST BP 80-89 MM HG: CPT | Performed by: FAMILY MEDICINE

## 2025-08-18 PROCEDURE — 3074F SYST BP LT 130 MM HG: CPT | Performed by: FAMILY MEDICINE

## 2025-08-18 RX ORDER — CYCLOBENZAPRINE HCL 10 MG
10 TABLET ORAL EVERY 8 HOURS PRN
Qty: 30 TABLET | Refills: 0 | Status: CANCELLED | OUTPATIENT
Start: 2025-08-18

## 2025-08-18 RX ORDER — ALBUTEROL SULFATE 90 UG/1
2 INHALANT RESPIRATORY (INHALATION) EVERY 6 HOURS PRN
Qty: 18 G | Status: CANCELLED | OUTPATIENT
Start: 2025-08-18

## 2025-08-18 RX ORDER — ALBUTEROL SULFATE 90 UG/1
2 INHALANT RESPIRATORY (INHALATION) EVERY 6 HOURS PRN
Qty: 1 EACH | Refills: 1 | Status: SHIPPED | OUTPATIENT
Start: 2025-08-18

## 2025-08-18 RX ORDER — ALPRAZOLAM 0.5 MG
0.25 TABLET ORAL NIGHTLY PRN
Qty: 15 TABLET | Refills: 5 | Status: SHIPPED | OUTPATIENT
Start: 2025-08-18 | End: 2025-09-17

## 2025-08-18 RX ORDER — ALPRAZOLAM 0.5 MG
0.5 TABLET ORAL
Qty: 15 TABLET | Refills: 5 | Status: CANCELLED | OUTPATIENT
Start: 2025-08-18 | End: 2025-09-17

## 2025-08-18 RX ORDER — CYCLOBENZAPRINE HCL 10 MG
10 TABLET ORAL EVERY 8 HOURS PRN
Qty: 30 TABLET | Refills: 1 | Status: SHIPPED | OUTPATIENT
Start: 2025-08-18

## 2025-08-18 ASSESSMENT — ENCOUNTER SYMPTOMS
GASTROINTESTINAL NEGATIVE: 1
RESPIRATORY NEGATIVE: 1

## 2025-08-21 LAB — DRUGS UR: NORMAL
